# Patient Record
Sex: MALE | Race: BLACK OR AFRICAN AMERICAN | NOT HISPANIC OR LATINO | Employment: FULL TIME | URBAN - METROPOLITAN AREA
[De-identification: names, ages, dates, MRNs, and addresses within clinical notes are randomized per-mention and may not be internally consistent; named-entity substitution may affect disease eponyms.]

---

## 2017-04-03 ENCOUNTER — HOSPITAL ENCOUNTER (OUTPATIENT)
Dept: URGENT CARE | Facility: CLINIC | Age: 42
Discharge: HOME | End: 2017-04-03
Attending: GENERAL PRACTICE | Admitting: GENERAL PRACTICE

## 2017-05-03 ENCOUNTER — HOSPITAL ENCOUNTER (OUTPATIENT)
Dept: URGENT CARE | Facility: CLINIC | Age: 42
Discharge: HOME | End: 2017-05-03
Attending: FAMILY MEDICINE | Admitting: FAMILY MEDICINE

## 2017-05-30 ENCOUNTER — HOSPITAL ENCOUNTER (OUTPATIENT)
Dept: MRI IMAGING | Facility: HOSPITAL | Age: 42
Discharge: HOME | End: 2017-05-30
Attending: PODIATRIST | Admitting: PODIATRIST

## 2017-06-02 ENCOUNTER — HOSPITAL ENCOUNTER (OUTPATIENT)
Dept: OTHER | Facility: HOSPITAL | Age: 42
Discharge: HOME | End: 2017-06-02
Attending: PODIATRIST | Admitting: PODIATRIST

## 2017-06-21 ENCOUNTER — HOSPITAL ENCOUNTER (OUTPATIENT)
Dept: OTHER | Facility: HOSPITAL | Age: 42
Discharge: HOME | End: 2017-06-21
Attending: PODIATRIST | Admitting: PODIATRIST

## 2017-06-21 LAB
ANION GAP SERPL CALC-SCNC: 11.8 MMOL/L (ref 10–20)
BACTERIA SPEC AEROBE CULT: NORMAL
BASOPHILS # BLD AUTO: 0.1 10*3/UL (ref 0–0.2)
BASOPHILS NFR BLD AUTO: 1 % (ref 0–2)
BUN SERPL-MCNC: 8 MG/DL (ref 8–20)
BUN/CREAT SERPL: 8 (ref 6.2–20.3)
CALCIUM SERPL-MCNC: 9.1 MG/DL (ref 8.9–10.3)
CHLORIDE SERPL-SCNC: 103 MMOL/L (ref 101–111)
CONV CO2: 27 MMOL/L (ref 22–32)
CREAT UR-MCNC: 1 MG/DL (ref 0.7–1.2)
DIFFERENTIAL METHOD BLD: (no result)
EOSINOPHIL # BLD AUTO: 0.3 10*3/UL (ref 0–0.3)
EOSINOPHIL # BLD AUTO: 3 % (ref 0–3)
ERYTHROCYTE [DISTWIDTH] IN BLOOD BY AUTOMATED COUNT: 13.6 % (ref 11.5–14.5)
GLUCOSE SERPL-MCNC: 95 MG/DL (ref 65–99)
HCT VFR BLD AUTO: 41.5 % (ref 40–54)
HGB BLD-MCNC: 13.7 G/DL (ref 14–18)
LYMPHOCYTES # BLD AUTO: 2.1 10*3/UL (ref 0.8–4.8)
LYMPHOCYTES NFR BLD AUTO: 24 % (ref 18–42)
Lab: NORMAL
MCH RBC QN AUTO: 29.9 PG (ref 26–32)
MCHC RBC AUTO-ENTMCNC: 33 G/DL (ref 32–36)
MCV RBC AUTO: 90.4 FL (ref 80–94)
MICRO REPORT STATUS: NORMAL
MONOCYTES # BLD AUTO: 0.9 10*3/UL (ref 0.1–1.3)
MONOCYTES NFR BLD AUTO: 11 % (ref 2–11)
NEUTROPHILS # BLD AUTO: 5.3 10*3/UL (ref 2.3–8.6)
NEUTROPHILS NFR BLD AUTO: 61 % (ref 50–75)
NRBC BLD AUTO-RTO: 0 /100{WBCS}
NRBC/RBC NFR BLD MANUAL: 0 10*3/UL
PLATELET # BLD AUTO: 415 10*3/UL (ref 150–450)
PMV BLD AUTO: 7.7 FL (ref 7.4–10.4)
POTASSIUM SERPL-SCNC: 3.8 MMOL/L (ref 3.6–5.1)
RBC # BLD AUTO: 4.59 10*6/UL (ref 4.6–6)
SODIUM SERPL-SCNC: 138 MMOL/L (ref 136–144)
SPECIMEN SOURCE: NORMAL
WBC # BLD AUTO: 8.6 10*3/UL (ref 4.5–11.5)

## 2017-06-28 ENCOUNTER — HOSPITAL ENCOUNTER (OUTPATIENT)
Dept: PREOP | Facility: HOSPITAL | Age: 42
Setting detail: HOSPITAL OUTPATIENT SURGERY
Discharge: HOME | End: 2017-06-28
Attending: PODIATRIST | Admitting: PODIATRIST

## 2017-07-26 ENCOUNTER — HOSPITAL ENCOUNTER (OUTPATIENT)
Dept: ORTHOPEDIC SURGERY | Facility: CLINIC | Age: 42
Discharge: HOME | End: 2017-07-26
Attending: PODIATRIST | Admitting: PODIATRIST

## 2021-06-21 ENCOUNTER — HOSPITAL ENCOUNTER (EMERGENCY)
Facility: HOSPITAL | Age: 46
Discharge: HOME | End: 2021-06-22
Admitting: EMERGENCY MEDICINE

## 2021-06-21 ENCOUNTER — APPOINTMENT (OUTPATIENT)
Dept: CT IMAGING | Facility: HOSPITAL | Age: 46
End: 2021-06-21

## 2021-06-21 DIAGNOSIS — R10.31 RIGHT LOWER QUADRANT ABDOMINAL PAIN: Primary | ICD-10-CM

## 2021-06-21 DIAGNOSIS — K92.1 MELENA: ICD-10-CM

## 2021-06-21 DIAGNOSIS — K57.92 DIVERTICULITIS: ICD-10-CM

## 2021-06-21 LAB
ALBUMIN SERPL-MCNC: 3.9 G/DL (ref 3.5–5.2)
ALBUMIN/GLOB SERPL: 1 G/DL
ALP SERPL-CCNC: 65 U/L (ref 39–117)
ALT SERPL W P-5'-P-CCNC: 20 U/L (ref 1–41)
ANION GAP SERPL CALCULATED.3IONS-SCNC: 11 MMOL/L (ref 5–15)
AST SERPL-CCNC: 21 U/L (ref 1–40)
BASOPHILS # BLD AUTO: 0.1 10*3/MM3 (ref 0–0.2)
BASOPHILS NFR BLD AUTO: 0.9 % (ref 0–1.5)
BILIRUB SERPL-MCNC: 0.4 MG/DL (ref 0–1.2)
BILIRUB UR QL STRIP: ABNORMAL
BUN SERPL-MCNC: 8 MG/DL (ref 6–20)
BUN/CREAT SERPL: 7.6 (ref 7–25)
CALCIUM SPEC-SCNC: 8.5 MG/DL (ref 8.6–10.5)
CHLORIDE SERPL-SCNC: 102 MMOL/L (ref 98–107)
CLARITY UR: ABNORMAL
CO2 SERPL-SCNC: 26 MMOL/L (ref 22–29)
COLOR UR: ABNORMAL
CREAT SERPL-MCNC: 1.05 MG/DL (ref 0.76–1.27)
DEPRECATED RDW RBC AUTO: 42.9 FL (ref 37–54)
EOSINOPHIL # BLD AUTO: 0.4 10*3/MM3 (ref 0–0.4)
EOSINOPHIL NFR BLD AUTO: 4.5 % (ref 0.3–6.2)
ERYTHROCYTE [DISTWIDTH] IN BLOOD BY AUTOMATED COUNT: 13.8 % (ref 12.3–15.4)
GFR SERPL CREATININE-BSD FRML MDRD: 93 ML/MIN/1.73
GLOBULIN UR ELPH-MCNC: 4 GM/DL
GLUCOSE SERPL-MCNC: 110 MG/DL (ref 65–99)
GLUCOSE UR STRIP-MCNC: NEGATIVE MG/DL
HCT VFR BLD AUTO: 39.7 % (ref 37.5–51)
HGB BLD-MCNC: 13.4 G/DL (ref 13–17.7)
HGB UR QL STRIP.AUTO: NEGATIVE
KETONES UR QL STRIP: NEGATIVE
LEUKOCYTE ESTERASE UR QL STRIP.AUTO: NEGATIVE
LIPASE SERPL-CCNC: 22 U/L (ref 13–60)
LYMPHOCYTES # BLD AUTO: 1.6 10*3/MM3 (ref 0.7–3.1)
LYMPHOCYTES NFR BLD AUTO: 18.9 % (ref 19.6–45.3)
MCH RBC QN AUTO: 30 PG (ref 26.6–33)
MCHC RBC AUTO-ENTMCNC: 33.8 G/DL (ref 31.5–35.7)
MCV RBC AUTO: 88.8 FL (ref 79–97)
MONOCYTES # BLD AUTO: 0.9 10*3/MM3 (ref 0.1–0.9)
MONOCYTES NFR BLD AUTO: 10.4 % (ref 5–12)
NEUTROPHILS NFR BLD AUTO: 5.6 10*3/MM3 (ref 1.7–7)
NEUTROPHILS NFR BLD AUTO: 65.3 % (ref 42.7–76)
NITRITE UR QL STRIP: NEGATIVE
NRBC BLD AUTO-RTO: 0.1 /100 WBC (ref 0–0.2)
PH UR STRIP.AUTO: 6 [PH] (ref 5–8)
PLATELET # BLD AUTO: 695 10*3/MM3 (ref 140–450)
PMV BLD AUTO: 7 FL (ref 6–12)
POTASSIUM SERPL-SCNC: 4 MMOL/L (ref 3.5–5.2)
PROT SERPL-MCNC: 7.9 G/DL (ref 6–8.5)
PROT UR QL STRIP: ABNORMAL
RBC # BLD AUTO: 4.47 10*6/MM3 (ref 4.14–5.8)
SODIUM SERPL-SCNC: 139 MMOL/L (ref 136–145)
SP GR UR STRIP: 1.03 (ref 1–1.03)
UROBILINOGEN UR QL STRIP: ABNORMAL
WBC # BLD AUTO: 8.6 10*3/MM3 (ref 3.4–10.8)

## 2021-06-21 PROCEDURE — 99284 EMERGENCY DEPT VISIT MOD MDM: CPT

## 2021-06-21 PROCEDURE — 83690 ASSAY OF LIPASE: CPT | Performed by: NURSE PRACTITIONER

## 2021-06-21 PROCEDURE — 0 IOPAMIDOL PER 1 ML: Performed by: NURSE PRACTITIONER

## 2021-06-21 PROCEDURE — 81003 URINALYSIS AUTO W/O SCOPE: CPT | Performed by: NURSE PRACTITIONER

## 2021-06-21 PROCEDURE — 80053 COMPREHEN METABOLIC PANEL: CPT

## 2021-06-21 PROCEDURE — 25010000002 ONDANSETRON PER 1 MG: Performed by: NURSE PRACTITIONER

## 2021-06-21 PROCEDURE — 74177 CT ABD & PELVIS W/CONTRAST: CPT

## 2021-06-21 PROCEDURE — 25010000002 MORPHINE PER 10 MG: Performed by: NURSE PRACTITIONER

## 2021-06-21 PROCEDURE — 96374 THER/PROPH/DIAG INJ IV PUSH: CPT

## 2021-06-21 PROCEDURE — 96375 TX/PRO/DX INJ NEW DRUG ADDON: CPT

## 2021-06-21 PROCEDURE — 85025 COMPLETE CBC W/AUTO DIFF WBC: CPT

## 2021-06-21 RX ORDER — ONDANSETRON 2 MG/ML
4 INJECTION INTRAMUSCULAR; INTRAVENOUS ONCE
Status: COMPLETED | OUTPATIENT
Start: 2021-06-21 | End: 2021-06-21

## 2021-06-21 RX ORDER — MORPHINE SULFATE 4 MG/ML
4 INJECTION, SOLUTION INTRAMUSCULAR; INTRAVENOUS ONCE
Status: COMPLETED | OUTPATIENT
Start: 2021-06-21 | End: 2021-06-21

## 2021-06-21 RX ADMIN — ONDANSETRON 4 MG: 2 INJECTION INTRAMUSCULAR; INTRAVENOUS at 20:18

## 2021-06-21 RX ADMIN — SODIUM CHLORIDE, POTASSIUM CHLORIDE, SODIUM LACTATE AND CALCIUM CHLORIDE 1000 ML: 600; 310; 30; 20 INJECTION, SOLUTION INTRAVENOUS at 20:18

## 2021-06-21 RX ADMIN — IOPAMIDOL 100 ML: 755 INJECTION, SOLUTION INTRAVENOUS at 22:41

## 2021-06-21 RX ADMIN — MORPHINE SULFATE 4 MG: 4 INJECTION INTRAVENOUS at 20:18

## 2021-06-21 NOTE — ED NOTES
"C/O stomach pain for 2 weeks. Dark unformed stools w/ Mucous. Black formed stool on Saturday. Pain on right lower abdomen \"it feels like theres heat in that area\".     Sushil Burleson RN  06/21/21 3620    "

## 2021-06-22 VITALS
RESPIRATION RATE: 16 BRPM | SYSTOLIC BLOOD PRESSURE: 125 MMHG | BODY MASS INDEX: 37.72 KG/M2 | TEMPERATURE: 98.2 F | HEIGHT: 75 IN | WEIGHT: 303.35 LBS | HEART RATE: 74 BPM | OXYGEN SATURATION: 96 % | DIASTOLIC BLOOD PRESSURE: 83 MMHG

## 2021-06-22 PROCEDURE — 25010000002 CEFTRIAXONE PER 250 MG: Performed by: NURSE PRACTITIONER

## 2021-06-22 PROCEDURE — 96375 TX/PRO/DX INJ NEW DRUG ADDON: CPT

## 2021-06-22 RX ORDER — PANTOPRAZOLE SODIUM 40 MG/10ML
40 INJECTION, POWDER, LYOPHILIZED, FOR SOLUTION INTRAVENOUS ONCE
Status: COMPLETED | OUTPATIENT
Start: 2021-06-22 | End: 2021-06-22

## 2021-06-22 RX ORDER — OMEPRAZOLE 40 MG/1
40 CAPSULE, DELAYED RELEASE ORAL DAILY
Qty: 14 CAPSULE | Refills: 0 | Status: ON HOLD | OUTPATIENT
Start: 2021-06-22 | End: 2022-09-20

## 2021-06-22 RX ORDER — CEFDINIR 300 MG/1
300 CAPSULE ORAL 2 TIMES DAILY
Qty: 14 CAPSULE | Refills: 0 | Status: SHIPPED | OUTPATIENT
Start: 2021-06-22 | End: 2021-06-29

## 2021-06-22 RX ADMIN — WATER 1 G: 1000 INJECTION, SOLUTION INTRAVENOUS at 01:02

## 2021-06-22 RX ADMIN — PANTOPRAZOLE SODIUM 40 MG: 40 INJECTION, POWDER, FOR SOLUTION INTRAVENOUS at 01:02

## 2021-06-22 NOTE — ED PROVIDER NOTES
Subjective   45-year-old male presents with a 2-week history of mucousy stool, followed by 2-day history of melena with right flank pain that radiates to the right lower quadrant.  Patient reports last Tuesday, 6/15/2021 he began with some abdominal bloating with constipation, reports he had a normal bowel movement this Thursday.  He reports he took a dose of Pepto-Bismol on Friday.  He reports he has had no vomiting nor nausea.  He does report subjective fever of chills.  He does report a history of peptic ulcer disease that he ports was NSAID induced.  He has not been seen by [facility] since 2010 when the symptoms resolved.    1. Location: Right flank  2. Quality: Shooting  3. Severity: Moderate  4. Worsening factors: Bowel movement  5. Alleviating factors: Denies  6. Onset: 2 weeks, worse 2 days ago  7. Radiation: Right lower quadrant  8. Frequency: Intermittent  9. Co-morbidities: Past Medical History:  No date: Peptic ulcer disease  10. Source: Patient            Review of Systems   Constitutional: Positive for appetite change, chills and fatigue. Negative for diaphoresis and fever.   Gastrointestinal: Positive for abdominal pain and blood in stool. Negative for constipation, diarrhea, nausea and vomiting.   Genitourinary: Positive for flank pain. Negative for decreased urine volume, difficulty urinating and hematuria.   Skin: Negative for color change, pallor and rash.   Neurological: Negative for dizziness and syncope.   Hematological: Negative for adenopathy.   All other systems reviewed and are negative.      Past Medical History:   Diagnosis Date   • Peptic ulcer disease        Allergies   Allergen Reactions   • Nsaids GI Intolerance       Past Surgical History:   Procedure Laterality Date   • ANKLE SURGERY Left 2016       History reviewed. No pertinent family history.    Social History     Socioeconomic History   • Marital status: Single     Spouse name: Not on file   • Number of children: Not on file   • Years  of education: Not on file   • Highest education level: Not on file   Tobacco Use   • Smoking status: Never Smoker   • Smokeless tobacco: Never Used   Vaping Use   • Vaping Use: Never used   Substance and Sexual Activity   • Alcohol use: Not Currently   • Drug use: Defer   • Sexual activity: Defer           Objective   Physical Exam  Vitals and nursing note reviewed.   Constitutional:       General: He is awake. He is not in acute distress.     Appearance: Normal appearance. He is well-developed. He is morbidly obese. He is not ill-appearing or toxic-appearing.   HENT:      Mouth/Throat:      Mouth: Mucous membranes are moist.   Eyes:      General: No scleral icterus.  Cardiovascular:      Rate and Rhythm: Normal rate and regular rhythm.      Heart sounds: Normal heart sounds, S1 normal and S2 normal. Heart sounds not distant. No murmur heard.   No friction rub. No gallop.    Pulmonary:      Effort: Pulmonary effort is normal.      Breath sounds: Normal breath sounds and air entry.   Abdominal:      General: Bowel sounds are normal. There is no distension.      Palpations: Abdomen is soft. There is no mass.      Tenderness: There is abdominal tenderness in the right lower quadrant. There is right CVA tenderness and guarding. There is no left CVA tenderness or rebound.      Hernia: No hernia is present.       Skin:     General: Skin is warm and dry.      Capillary Refill: Capillary refill takes less than 2 seconds.      Coloration: Skin is not jaundiced or pale.      Findings: No rash.   Neurological:      Mental Status: He is alert and oriented to person, place, and time.   Psychiatric:         Mood and Affect: Mood normal.         Behavior: Behavior normal. Behavior is cooperative.         Thought Content: Thought content normal.         Judgment: Judgment normal.         Procedures           ED Course  ED Course as of Jun 22 0511   Mon Jun 21, 2021   5302 Laying care related to patient not being taken to CT.     [AL]   2226 Awaiting patient to go to CT.    [AL]      ED Course User Index  [AL] Tory Moses, APRN      CT Abdomen Pelvis With Contrast    Result Date: 6/21/2021  1. The findings are compatible with acute diverticulitis of the sigmoid colon, with thickening of the wall of a segment of the sigmoid colon and moderate surrounding fat stranding. Colon cancer could less commonly cause similar findings; while considered less likely, recommend follow-up to resolution or colonoscopy if it has not been previously done. Negative for abscess. 2. Mild cardiomegaly. 3. Additional findings as reported above.  Electronically Signed By-Kellee Acosta MD On:6/21/2021 11:16 PM This report was finalized on [id number] by  Kellee Acosta MD.    Medications   lactated ringers bolus 1,000 mL (0 mL Intravenous Stopped 6/21/21 2050)   Morphine sulfate (PF) injection 4 mg (4 mg Intravenous Given 6/21/21 2018)   ondansetron (ZOFRAN) injection 4 mg (4 mg Intravenous Given 6/21/21 2018)   iopamidol (ISOVUE-370) 76 % injection 100 mL (100 mL Intravenous Given 6/21/21 2241)   cefTRIAXone (ROCEPHIN) in SWFI 1 gram/10ml IV PUSH syringe (1 g Intravenous Given 6/22/21 0102)   pantoprazole (PROTONIX) injection 40 mg (40 mg Intravenous Given 6/22/21 0102)     Labs Reviewed   COMPREHENSIVE METABOLIC PANEL - Abnormal; Notable for the following components:       Result Value    Glucose 110 (*)     Calcium 8.5 (*)     All other components within normal limits    Narrative:     GFR Normal >60  Chronic Kidney Disease <60  Kidney Failure <15     CBC WITH AUTO DIFFERENTIAL - Abnormal; Notable for the following components:    Platelets 695 (*)     Lymphocyte % 18.9 (*)     All other components within normal limits   URINALYSIS W/ CULTURE IF INDICATED - Abnormal; Notable for the following components:    Color, UA Dark Yellow (*)     Appearance, UA Cloudy (*)     Bilirubin, UA Small (1+) (*)     Protein, UA Trace (*)     All other components within normal  limits    Narrative:     Urine microscopic not indicated.   LIPASE - Normal   GASTROINTESTINAL PANEL, PCR   CBC AND DIFFERENTIAL    Narrative:     The following orders were created for panel order CBC & Differential.  Procedure                               Abnormality         Status                     ---------                               -----------         ------                     CBC Auto Differential[[id number]]        Abnormal            Final result                 Please view results for these tests on the individual orders.                                          MDM  Number of Diagnoses or Management Options  Diverticulitis  Melena  Right lower quadrant abdominal pain  Diagnosis management comments: Chart Review: 4/8/2021 patient was seen at the urgent care center for thumb tendinitis.  Comorbidity: Past Medical History:  No date: Peptic ulcer disease  Imaging: Was interpreted by physician and reviewed by myself: CT Abdomen Pelvis With Contrast   Final Result    1. The findings are compatible with acute diverticulitis of the sigmoid    colon, with thickening of the wall of a segment of the sigmoid colon and    moderate surrounding fat stranding. Colon cancer could less commonly    cause similar findings; while considered less likely, recommend    follow-up to resolution or colonoscopy if it has not been previously    done. Negative for abscess.    2. Mild cardiomegaly.    3. Additional findings as reported above.         Electronically Signed By-Kellee Acosta MD On:6/21/2021 11:16 PM    This report was finalized on [id number] by  Kellee Acosta MD    Patient undressed and placed in gown for exam.  Appropriate PPE worn during patient exam. 45-year-old male presents with a 2-week history of mucousy stool, followed by 2-day history of melena with right flank pain that radiates to the right lower quadrant.  Patient reports last Tuesday, 6/15/2021 he began with some abdominal bloating with constipation,  reports he had a normal bowel movement this Thursday.  He reports he took a dose of Pepto-Bismol on Friday.  He reports he has had no vomiting nor nausea.  He does report subjective fever of chills.  He does report a history of peptic ulcer disease that he ports was NSAID induced.  He has not been seen by [facility] since 2010 when the symptoms resolved.  On exam, patient was noted to have right CVAT, as well as right lower quadrant tenderness to palpation.  IV established and labs obtained.  Abdominal protocol initiated.  Patient was given lactated Ringer's 1 L bolus, morphine 4 mg IV, and Zofran 4 mg IV.  CT of the abdomen pelvis with IV contrast obtained with the above findings. CBC and CMP essentially unremarkable.  No infectious process noted of the urine.  Lipase within normal limits. Upon reassessment, patient reports that his pain is relieved.  He was given Rocephin 1 g IV, and discharged home with Omnicef.  He is instructed to eat a clear liquid diet for the next 48 hours, then advance as tolerated.  He is given referral to GI, as its been 11 years since he has been seen by them.  Hemoccult was negative.  Upon reassessment, he is flesh tone warm and dry no distress noted.  Vital signs are stable.    Disposition/Treatment: Discussed results with patient, verbalized understanding.  Discussed reasons to return to the ER, patient verbalized understanding.  Agreeable with plan of care.  Patient was stable upon discharge.    EMR Dragon transcription disclaimer:  Some of this encounter note is an electronic transcription translation of spoken language to printed text. The electronic translation of spoken language may permit erroneous, or at times, nonsensical words or phrases to be inadvertently transcribed; Although I have reviewed the note for such errors some may still exist.              Amount and/or Complexity of Data Reviewed  Clinical lab tests: reviewed  Tests in the radiology section of CPT®:  reviewed    Patient Progress  Patient progress: stable      Final diagnoses:   Right lower quadrant abdominal pain   Diverticulitis   Melena       ED Disposition  ED Disposition     ED Disposition Condition Comment    Discharge Stable           Johnnie Zuleta MD  [address] [facility] [place] [address] [phone]    Schedule an appointment as soon as possible for a visit       [facility]  [address] [address] [phone]  Call today      [facility] EMERGENCY DEPARTMENT  [address] [address] [phone]  Go to   If symptoms worsen         Medication List      New Prescriptions    cefdinir 300 MG capsule  Commonly known as: OMNICEF  Take 1 capsule by mouth 2 (Two) Times a Day for 7 days.     omeprazole 40 MG capsule  Commonly known as: priLOSEC  Take 1 capsule by mouth Daily.           Where to Get Your Medications      These medications were sent to [id number] - [place], [place] - [address] AT [facility] - [phone]  - [phone]   [address] [facility] [place] [address]    Phone: [phone]   · cefdinir 300 MG capsule  · omeprazole 40 MG capsule          Tory Moses, APRN  06/22/21 0511

## 2021-06-22 NOTE — DISCHARGE INSTRUCTIONS
Take omeprazole and Omnicef as prescribed.  As discussed, clear liquid diet for the next 48 hours, then advance as tolerated.  Avoid any red or orange foods as this may appear like blood in your vomit or stool.  It is important to establish primary care provider for your primary care needs, call Dr. Zuleta's office to establish care. Call gastroenterology to make follow up appointment. Return to the ER for new or worsening symptoms.

## 2021-07-30 ENCOUNTER — OFFICE (AMBULATORY)
Dept: URBAN - METROPOLITAN AREA CLINIC 64 | Facility: CLINIC | Age: 46
End: 2021-07-30

## 2021-07-30 VITALS
HEIGHT: 75 IN | DIASTOLIC BLOOD PRESSURE: 86 MMHG | SYSTOLIC BLOOD PRESSURE: 138 MMHG | HEART RATE: 72 BPM | WEIGHT: 305 LBS

## 2021-07-30 DIAGNOSIS — R93.3 ABNORMAL FINDINGS ON DIAGNOSTIC IMAGING OF OTHER PARTS OF DI: ICD-10-CM

## 2021-07-30 DIAGNOSIS — K57.32 DIVERTICULITIS OF LARGE INTESTINE WITHOUT PERFORATION OR ABS: ICD-10-CM

## 2021-07-30 DIAGNOSIS — K59.00 CONSTIPATION, UNSPECIFIED: ICD-10-CM

## 2021-07-30 DIAGNOSIS — R10.9 UNSPECIFIED ABDOMINAL PAIN: ICD-10-CM

## 2021-07-30 PROCEDURE — 99204 OFFICE O/P NEW MOD 45 MIN: CPT | Performed by: NURSE PRACTITIONER

## 2021-09-01 ENCOUNTER — OFFICE (AMBULATORY)
Dept: URBAN - METROPOLITAN AREA PATHOLOGY 4 | Facility: PATHOLOGY | Age: 46
End: 2021-09-01

## 2021-09-01 ENCOUNTER — ON CAMPUS - OUTPATIENT (AMBULATORY)
Dept: URBAN - METROPOLITAN AREA HOSPITAL 2 | Facility: HOSPITAL | Age: 46
End: 2021-09-01

## 2021-09-01 VITALS
OXYGEN SATURATION: 96 % | SYSTOLIC BLOOD PRESSURE: 125 MMHG | SYSTOLIC BLOOD PRESSURE: 117 MMHG | OXYGEN SATURATION: 98 % | SYSTOLIC BLOOD PRESSURE: 127 MMHG | HEART RATE: 84 BPM | HEART RATE: 83 BPM | WEIGHT: 304 LBS | SYSTOLIC BLOOD PRESSURE: 128 MMHG | DIASTOLIC BLOOD PRESSURE: 82 MMHG | OXYGEN SATURATION: 99 % | SYSTOLIC BLOOD PRESSURE: 123 MMHG | RESPIRATION RATE: 16 BRPM | DIASTOLIC BLOOD PRESSURE: 66 MMHG | SYSTOLIC BLOOD PRESSURE: 114 MMHG | DIASTOLIC BLOOD PRESSURE: 81 MMHG | SYSTOLIC BLOOD PRESSURE: 135 MMHG | DIASTOLIC BLOOD PRESSURE: 109 MMHG | TEMPERATURE: 96.9 F | DIASTOLIC BLOOD PRESSURE: 74 MMHG | HEART RATE: 79 BPM | HEART RATE: 89 BPM | DIASTOLIC BLOOD PRESSURE: 77 MMHG | OXYGEN SATURATION: 97 % | RESPIRATION RATE: 18 BRPM | RESPIRATION RATE: 17 BRPM | DIASTOLIC BLOOD PRESSURE: 80 MMHG | SYSTOLIC BLOOD PRESSURE: 153 MMHG | HEART RATE: 75 BPM | HEART RATE: 86 BPM | SYSTOLIC BLOOD PRESSURE: 141 MMHG | DIASTOLIC BLOOD PRESSURE: 88 MMHG | HEART RATE: 88 BPM | HEIGHT: 75 IN | DIASTOLIC BLOOD PRESSURE: 76 MMHG | SYSTOLIC BLOOD PRESSURE: 143 MMHG | HEART RATE: 78 BPM | DIASTOLIC BLOOD PRESSURE: 89 MMHG

## 2021-09-01 DIAGNOSIS — R93.3 ABNORMAL FINDINGS ON DIAGNOSTIC IMAGING OF OTHER PARTS OF DI: ICD-10-CM

## 2021-09-01 DIAGNOSIS — D12.3 BENIGN NEOPLASM OF TRANSVERSE COLON: ICD-10-CM

## 2021-09-01 DIAGNOSIS — K64.8 OTHER HEMORRHOIDS: ICD-10-CM

## 2021-09-01 DIAGNOSIS — K57.30 DIVERTICULOSIS OF LARGE INTESTINE WITHOUT PERFORATION OR ABS: ICD-10-CM

## 2021-09-01 LAB
GI HISTOLOGY: A. UNSPECIFIED: (no result)
GI HISTOLOGY: PDF REPORT: (no result)

## 2021-09-01 PROCEDURE — 45385 COLONOSCOPY W/LESION REMOVAL: CPT | Performed by: INTERNAL MEDICINE

## 2021-09-01 PROCEDURE — 88305 TISSUE EXAM BY PATHOLOGIST: CPT | Performed by: INTERNAL MEDICINE

## 2021-09-10 ENCOUNTER — OFFICE (AMBULATORY)
Dept: URBAN - METROPOLITAN AREA CLINIC 64 | Facility: CLINIC | Age: 46
End: 2021-09-10

## 2021-09-10 VITALS
DIASTOLIC BLOOD PRESSURE: 95 MMHG | WEIGHT: 307 LBS | HEIGHT: 75 IN | SYSTOLIC BLOOD PRESSURE: 141 MMHG | HEART RATE: 79 BPM

## 2021-09-10 DIAGNOSIS — R10.31 RIGHT LOWER QUADRANT PAIN: ICD-10-CM

## 2021-09-10 DIAGNOSIS — K59.00 CONSTIPATION, UNSPECIFIED: ICD-10-CM

## 2021-09-10 DIAGNOSIS — K63.5 POLYP OF COLON: ICD-10-CM

## 2021-09-10 PROCEDURE — 99213 OFFICE O/P EST LOW 20 MIN: CPT | Performed by: NURSE PRACTITIONER

## 2022-09-19 ENCOUNTER — HOSPITAL ENCOUNTER (INPATIENT)
Facility: HOSPITAL | Age: 47
LOS: 3 days | Discharge: HOME | End: 2022-09-23
Attending: EMERGENCY MEDICINE | Admitting: HOSPITALIST

## 2022-09-19 ENCOUNTER — APPOINTMENT (OUTPATIENT)
Dept: GENERAL RADIOLOGY | Facility: HOSPITAL | Age: 47
End: 2022-09-19

## 2022-09-19 ENCOUNTER — APPOINTMENT (OUTPATIENT)
Dept: CARDIOLOGY | Facility: HOSPITAL | Age: 47
End: 2022-09-19

## 2022-09-19 DIAGNOSIS — I27.20 PULMONARY HYPERTENSION: ICD-10-CM

## 2022-09-19 DIAGNOSIS — I50.9 ACUTE CONGESTIVE HEART FAILURE, UNSPECIFIED HEART FAILURE TYPE: Primary | ICD-10-CM

## 2022-09-19 DIAGNOSIS — I42.0 CARDIOMYOPATHY, DILATED: ICD-10-CM

## 2022-09-19 PROBLEM — I50.20 HFREF (HEART FAILURE WITH REDUCED EJECTION FRACTION): Status: ACTIVE | Noted: 2022-09-19

## 2022-09-19 PROBLEM — R73.03 PRE-DIABETES: Status: ACTIVE | Noted: 2022-09-19

## 2022-09-19 PROBLEM — E66.2 CLASS 2 OBESITY WITH ALVEOLAR HYPOVENTILATION, SERIOUS COMORBIDITY, AND BODY MASS INDEX (BMI) OF 39.0 TO 39.9 IN ADULT: Status: ACTIVE | Noted: 2022-09-19

## 2022-09-19 PROBLEM — E78.49 OTHER HYPERLIPIDEMIA: Status: ACTIVE | Noted: 2022-09-19

## 2022-09-19 PROBLEM — E66.812 CLASS 2 OBESITY WITH ALVEOLAR HYPOVENTILATION, SERIOUS COMORBIDITY, AND BODY MASS INDEX (BMI) OF 39.0 TO 39.9 IN ADULT: Status: ACTIVE | Noted: 2022-09-19

## 2022-09-19 LAB
ALBUMIN SERPL-MCNC: 3.96 G/DL (ref 3.5–5.2)
ALBUMIN/GLOB SERPL: 1.9 G/DL
ALP SERPL-CCNC: 63 U/L (ref 39–117)
ALT SERPL W P-5'-P-CCNC: 42 U/L (ref 1–41)
ANION GAP SERPL CALCULATED.3IONS-SCNC: 10.4 MMOL/L (ref 5–15)
AST SERPL-CCNC: 36 U/L (ref 1–40)
BASOPHILS # BLD AUTO: 0.05 10*3/MM3 (ref 0–0.2)
BASOPHILS NFR BLD AUTO: 0.5 % (ref 0–1.5)
BH CV ECHO MEAS - ACS: 2.46 CM
BH CV ECHO MEAS - AO MAX PG: 6.9 MMHG
BH CV ECHO MEAS - AO MEAN PG: 3.6 MMHG
BH CV ECHO MEAS - AO ROOT DIAM: 3.6 CM
BH CV ECHO MEAS - AO V2 MAX: 131.8 CM/SEC
BH CV ECHO MEAS - AO V2 VTI: 19.3 CM
BH CV ECHO MEAS - AVA(I,D): 1.55 CM2
BH CV ECHO MEAS - EDV(CUBED): 223.2 ML
BH CV ECHO MEAS - EDV(MOD-SP4): 203.4 ML
BH CV ECHO MEAS - EF(MOD-BP): 24 %
BH CV ECHO MEAS - EF(MOD-SP4): 24.4 %
BH CV ECHO MEAS - ESV(CUBED): 151.4 ML
BH CV ECHO MEAS - ESV(MOD-SP4): 153.7 ML
BH CV ECHO MEAS - FS: 12.1 %
BH CV ECHO MEAS - IVS/LVPW: 0.86 CM
BH CV ECHO MEAS - IVSD: 1.27 CM
BH CV ECHO MEAS - LA DIMENSION: 4.9 CM
BH CV ECHO MEAS - LV DIASTOLIC VOL/BSA (35-75): 76.1 CM2
BH CV ECHO MEAS - LV MASS(C)D: 382.5 GRAMS
BH CV ECHO MEAS - LV MAX PG: 2.6 MMHG
BH CV ECHO MEAS - LV MEAN PG: 1.06 MMHG
BH CV ECHO MEAS - LV SYSTOLIC VOL/BSA (12-30): 57.5 CM2
BH CV ECHO MEAS - LV V1 MAX: 80.1 CM/SEC
BH CV ECHO MEAS - LV V1 VTI: 10.4 CM
BH CV ECHO MEAS - LVIDD: 6.1 CM
BH CV ECHO MEAS - LVIDS: 5.3 CM
BH CV ECHO MEAS - LVOT AREA: 2.9 CM2
BH CV ECHO MEAS - LVOT DIAM: 1.91 CM
BH CV ECHO MEAS - LVPWD: 1.47 CM
BH CV ECHO MEAS - MR MAX PG: 66.4 MMHG
BH CV ECHO MEAS - MR MAX VEL: 407.3 CM/SEC
BH CV ECHO MEAS - MV A MAX VEL: 113.7 CM/SEC
BH CV ECHO MEAS - MV E MAX VEL: 109.3 CM/SEC
BH CV ECHO MEAS - MV E/A: 0.96
BH CV ECHO MEAS - MV MAX PG: 5.7 MMHG
BH CV ECHO MEAS - MV MEAN PG: 2.37 MMHG
BH CV ECHO MEAS - MV V2 VTI: 13.7 CM
BH CV ECHO MEAS - MVA(VTI): 2.17 CM2
BH CV ECHO MEAS - PA ACC TIME: 0.05 SEC
BH CV ECHO MEAS - PA PR(ACCEL): 55.6 MMHG
BH CV ECHO MEAS - PA V2 MAX: 70.4 CM/SEC
BH CV ECHO MEAS - RAP SYSTOLE: 15 MMHG
BH CV ECHO MEAS - RV MAX PG: 1.23 MMHG
BH CV ECHO MEAS - RV V1 MAX: 55.4 CM/SEC
BH CV ECHO MEAS - RV V1 VTI: 10 CM
BH CV ECHO MEAS - RVDD: 3.8 CM
BH CV ECHO MEAS - RVSP: 47.4 MMHG
BH CV ECHO MEAS - SI(MOD-SP4): 18.6 ML/M2
BH CV ECHO MEAS - SV(LVOT): 29.8 ML
BH CV ECHO MEAS - SV(MOD-SP4): 49.7 ML
BH CV ECHO MEAS - TR MAX PG: 32.4 MMHG
BH CV ECHO MEAS - TR MAX VEL: 284.8 CM/SEC
BILIRUB SERPL-MCNC: 1.5 MG/DL (ref 0–1.2)
BUN SERPL-MCNC: 12 MG/DL (ref 6–20)
BUN/CREAT SERPL: 9.8 (ref 7–25)
CALCIUM SPEC-SCNC: 8.9 MG/DL (ref 8.6–10.5)
CHLORIDE SERPL-SCNC: 107 MMOL/L (ref 98–107)
CHOLEST SERPL-MCNC: 151 MG/DL (ref 0–200)
CO2 SERPL-SCNC: 22.6 MMOL/L (ref 22–29)
CREAT SERPL-MCNC: 1.22 MG/DL (ref 0.76–1.27)
D DIMER PPP FEU-MCNC: 0.52 MCGFEU/ML
DEPRECATED RDW RBC AUTO: 46.9 FL (ref 37–54)
EGFRCR SERPLBLD CKD-EPI 2021: 75 ML/MIN/1.73
EOSINOPHIL # BLD AUTO: 0.22 10*3/MM3 (ref 0–0.4)
EOSINOPHIL NFR BLD AUTO: 2.3 % (ref 0.3–6.2)
ERYTHROCYTE [DISTWIDTH] IN BLOOD BY AUTOMATED COUNT: 13.8 % (ref 12.3–15.4)
FLUAV SUBTYP SPEC NAA+PROBE: NOT DETECTED
FLUBV RNA ISLT QL NAA+PROBE: NOT DETECTED
GLOBULIN UR ELPH-MCNC: 2.1 GM/DL
GLUCOSE SERPL-MCNC: 140 MG/DL (ref 65–99)
HBA1C MFR BLD: 5.9 % (ref 3.5–5.6)
HCT VFR BLD AUTO: 42.4 % (ref 37.5–51)
HDLC SERPL-MCNC: 39 MG/DL (ref 40–60)
HGB BLD-MCNC: 13.5 G/DL (ref 13–17.7)
IMM GRANULOCYTES # BLD AUTO: 0.01 10*3/MM3 (ref 0–0.05)
IMM GRANULOCYTES NFR BLD AUTO: 0.1 % (ref 0–0.5)
INR PPP: 0.9 (ref 0.8–1.2)
LDLC SERPL CALC-MCNC: 100 MG/DL (ref 0–100)
LDLC/HDLC SERPL: 2.57 {RATIO}
LYMPHOCYTES # BLD AUTO: 1.92 10*3/MM3 (ref 0.7–3.1)
LYMPHOCYTES NFR BLD AUTO: 20.5 % (ref 19.6–45.3)
MAXIMAL PREDICTED HEART RATE: 176 BPM
MCH RBC QN AUTO: 28.9 PG (ref 26.6–33)
MCHC RBC AUTO-ENTMCNC: 31.8 G/DL (ref 31.5–35.7)
MCV RBC AUTO: 90.8 FL (ref 79–97)
MONOCYTES # BLD AUTO: 0.57 10*3/MM3 (ref 0.1–0.9)
MONOCYTES NFR BLD AUTO: 6.1 % (ref 5–12)
NEUTROPHILS NFR BLD AUTO: 6.6 10*3/MM3 (ref 1.7–7)
NEUTROPHILS NFR BLD AUTO: 70.5 % (ref 42.7–76)
NT-PROBNP SERPL-MCNC: 4471 PG/ML (ref 0–450)
PLATELET # BLD AUTO: 549 10*3/MM3 (ref 140–450)
PMV BLD AUTO: 9.5 FL (ref 6–12)
POTASSIUM SERPL-SCNC: 3.8 MMOL/L (ref 3.5–5.2)
PROT SERPL-MCNC: 6.1 G/DL (ref 6–8.5)
PROTHROMBIN TIME: 12 SECONDS
QT INTERVAL: 388 MS
RBC # BLD AUTO: 4.67 10*6/MM3 (ref 4.14–5.8)
SARS-COV-2 RNA RESP QL NAA+PROBE: NOT DETECTED
SODIUM SERPL-SCNC: 140 MMOL/L (ref 136–145)
STRESS TARGET HR: 150 BPM
TRIGL SERPL-MCNC: 59 MG/DL (ref 0–150)
TROPONIN T SERPL-MCNC: <0.01 NG/ML (ref 0–0.03)
TSH SERPL DL<=0.05 MIU/L-ACNC: 1.92 UIU/ML (ref 0.27–4.2)
VLDLC SERPL-MCNC: 12 MG/DL (ref 5–40)
WBC NRBC COR # BLD: 9.37 10*3/MM3 (ref 3.4–10.8)

## 2022-09-19 PROCEDURE — 80061 LIPID PANEL: CPT | Performed by: HOSPITALIST

## 2022-09-19 PROCEDURE — 99204 OFFICE O/P NEW MOD 45 MIN: CPT | Performed by: INTERNAL MEDICINE

## 2022-09-19 PROCEDURE — 99285 EMERGENCY DEPT VISIT HI MDM: CPT

## 2022-09-19 PROCEDURE — 93306 TTE W/DOPPLER COMPLETE: CPT

## 2022-09-19 PROCEDURE — 93005 ELECTROCARDIOGRAM TRACING: CPT | Performed by: EMERGENCY MEDICINE

## 2022-09-19 PROCEDURE — 25010000002 SULFUR HEXAFLUORIDE MICROSPH 60.7-25 MG RECONSTITUTED SUSPENSION: Performed by: HOSPITALIST

## 2022-09-19 PROCEDURE — 93010 ELECTROCARDIOGRAM REPORT: CPT | Performed by: EMERGENCY MEDICINE

## 2022-09-19 PROCEDURE — 84484 ASSAY OF TROPONIN QUANT: CPT | Performed by: HOSPITALIST

## 2022-09-19 PROCEDURE — 93306 TTE W/DOPPLER COMPLETE: CPT | Performed by: INTERNAL MEDICINE

## 2022-09-19 PROCEDURE — G0378 HOSPITAL OBSERVATION PER HR: HCPCS

## 2022-09-19 PROCEDURE — 85610 PROTHROMBIN TIME: CPT | Performed by: EMERGENCY MEDICINE

## 2022-09-19 PROCEDURE — 99285 EMERGENCY DEPT VISIT HI MDM: CPT | Performed by: EMERGENCY MEDICINE

## 2022-09-19 PROCEDURE — 25010000002 ENOXAPARIN PER 10 MG: Performed by: HOSPITALIST

## 2022-09-19 PROCEDURE — 25010000002 FUROSEMIDE PER 20 MG: Performed by: INTERNAL MEDICINE

## 2022-09-19 PROCEDURE — 80050 GENERAL HEALTH PANEL: CPT | Performed by: EMERGENCY MEDICINE

## 2022-09-19 PROCEDURE — 83880 ASSAY OF NATRIURETIC PEPTIDE: CPT | Performed by: EMERGENCY MEDICINE

## 2022-09-19 PROCEDURE — 36415 COLL VENOUS BLD VENIPUNCTURE: CPT

## 2022-09-19 PROCEDURE — 84484 ASSAY OF TROPONIN QUANT: CPT | Performed by: EMERGENCY MEDICINE

## 2022-09-19 PROCEDURE — 71046 X-RAY EXAM CHEST 2 VIEWS: CPT | Performed by: EMERGENCY MEDICINE

## 2022-09-19 PROCEDURE — 87636 SARSCOV2 & INF A&B AMP PRB: CPT | Performed by: EMERGENCY MEDICINE

## 2022-09-19 PROCEDURE — 83036 HEMOGLOBIN GLYCOSYLATED A1C: CPT | Performed by: HOSPITALIST

## 2022-09-19 PROCEDURE — 85379 FIBRIN DEGRADATION QUANT: CPT | Performed by: EMERGENCY MEDICINE

## 2022-09-19 RX ORDER — NITROGLYCERIN 0.4 MG/1
0.4 TABLET SUBLINGUAL
Status: DISCONTINUED | OUTPATIENT
Start: 2022-09-19 | End: 2022-09-23 | Stop reason: HOSPADM

## 2022-09-19 RX ORDER — ASPIRIN 81 MG/1
81 TABLET ORAL DAILY
Status: DISCONTINUED | OUTPATIENT
Start: 2022-09-20 | End: 2022-09-23 | Stop reason: HOSPADM

## 2022-09-19 RX ORDER — HYDROCODONE BITARTRATE AND ACETAMINOPHEN 5; 325 MG/1; MG/1
1 TABLET ORAL EVERY 4 HOURS PRN
Status: DISCONTINUED | OUTPATIENT
Start: 2022-09-19 | End: 2022-09-23 | Stop reason: HOSPADM

## 2022-09-19 RX ORDER — BISACODYL 5 MG/1
5 TABLET, DELAYED RELEASE ORAL DAILY PRN
Status: DISCONTINUED | OUTPATIENT
Start: 2022-09-19 | End: 2022-09-23 | Stop reason: HOSPADM

## 2022-09-19 RX ORDER — LISINOPRIL 5 MG/1
10 TABLET ORAL
Status: DISCONTINUED | OUTPATIENT
Start: 2022-09-19 | End: 2022-09-23 | Stop reason: HOSPADM

## 2022-09-19 RX ORDER — SODIUM CHLORIDE 0.9 % (FLUSH) 0.9 %
10 SYRINGE (ML) INJECTION EVERY 12 HOURS SCHEDULED
Status: DISCONTINUED | OUTPATIENT
Start: 2022-09-19 | End: 2022-09-23 | Stop reason: HOSPADM

## 2022-09-19 RX ORDER — BISACODYL 10 MG
10 SUPPOSITORY, RECTAL RECTAL DAILY PRN
Status: DISCONTINUED | OUTPATIENT
Start: 2022-09-19 | End: 2022-09-23 | Stop reason: HOSPADM

## 2022-09-19 RX ORDER — FUROSEMIDE 10 MG/ML
40 INJECTION INTRAMUSCULAR; INTRAVENOUS DAILY
Status: DISCONTINUED | OUTPATIENT
Start: 2022-09-19 | End: 2022-09-21

## 2022-09-19 RX ORDER — AMOXICILLIN 250 MG
2 CAPSULE ORAL 2 TIMES DAILY PRN
Status: DISCONTINUED | OUTPATIENT
Start: 2022-09-19 | End: 2022-09-23 | Stop reason: HOSPADM

## 2022-09-19 RX ORDER — CARVEDILOL 3.12 MG/1
3.12 TABLET ORAL 2 TIMES DAILY WITH MEALS
Status: DISCONTINUED | OUTPATIENT
Start: 2022-09-19 | End: 2022-09-21

## 2022-09-19 RX ORDER — ACETAMINOPHEN 325 MG/1
650 TABLET ORAL EVERY 4 HOURS PRN
Status: DISCONTINUED | OUTPATIENT
Start: 2022-09-19 | End: 2022-09-23 | Stop reason: HOSPADM

## 2022-09-19 RX ORDER — PANTOPRAZOLE SODIUM 40 MG/1
40 TABLET, DELAYED RELEASE ORAL EVERY MORNING
Refills: 0 | Status: DISCONTINUED | OUTPATIENT
Start: 2022-09-20 | End: 2022-09-20

## 2022-09-19 RX ORDER — SODIUM CHLORIDE 0.9 % (FLUSH) 0.9 %
10 SYRINGE (ML) INJECTION AS NEEDED
Status: DISCONTINUED | OUTPATIENT
Start: 2022-09-19 | End: 2022-09-23 | Stop reason: HOSPADM

## 2022-09-19 RX ORDER — ASPIRIN 325 MG
325 TABLET ORAL ONCE
Status: COMPLETED | OUTPATIENT
Start: 2022-09-19 | End: 2022-09-19

## 2022-09-19 RX ORDER — ENOXAPARIN SODIUM 100 MG/ML
40 INJECTION SUBCUTANEOUS EVERY 12 HOURS
Status: DISCONTINUED | OUTPATIENT
Start: 2022-09-19 | End: 2022-09-23 | Stop reason: HOSPADM

## 2022-09-19 RX ORDER — POLYETHYLENE GLYCOL 3350 17 G/17G
17 POWDER, FOR SOLUTION ORAL DAILY PRN
Status: DISCONTINUED | OUTPATIENT
Start: 2022-09-19 | End: 2022-09-23 | Stop reason: HOSPADM

## 2022-09-19 RX ADMIN — ENOXAPARIN SODIUM 40 MG: 100 INJECTION SUBCUTANEOUS at 15:16

## 2022-09-19 RX ADMIN — SULFUR HEXAFLUORIDE 2 ML: KIT at 16:42

## 2022-09-19 RX ADMIN — ASPIRIN 325 MG ORAL TABLET 325 MG: 325 PILL ORAL at 02:35

## 2022-09-19 RX ADMIN — FUROSEMIDE 40 MG: 10 INJECTION, SOLUTION INTRAMUSCULAR; INTRAVENOUS at 18:30

## 2022-09-19 RX ADMIN — Medication 10 ML: at 20:30

## 2022-09-19 RX ADMIN — LISINOPRIL 10 MG: 5 TABLET ORAL at 18:30

## 2022-09-19 RX ADMIN — CARVEDILOL 3.12 MG: 3.12 TABLET, FILM COATED ORAL at 18:30

## 2022-09-20 PROBLEM — E66.812 CLASS 2 OBESITY WITH ALVEOLAR HYPOVENTILATION, SERIOUS COMORBIDITY, AND BODY MASS INDEX (BMI) OF 39.0 TO 39.9 IN ADULT: Chronic | Status: ACTIVE | Noted: 2022-09-19

## 2022-09-20 PROBLEM — R73.03 PRE-DIABETES: Chronic | Status: ACTIVE | Noted: 2022-09-19

## 2022-09-20 PROBLEM — I42.0 CARDIOMYOPATHY, DILATED: Status: ACTIVE | Noted: 2022-09-20

## 2022-09-20 PROBLEM — M21.969 ACQUIRED DEFORMITY OF ANKLE AND FOOT: Status: ACTIVE | Noted: 2017-05-04

## 2022-09-20 PROBLEM — E78.49 OTHER HYPERLIPIDEMIA: Chronic | Status: ACTIVE | Noted: 2022-09-19

## 2022-09-20 PROBLEM — I50.9 ACUTE CONGESTIVE HEART FAILURE, UNSPECIFIED HEART FAILURE TYPE: Status: ACTIVE | Noted: 2022-09-20

## 2022-09-20 PROBLEM — M10.9 GOUT: Status: ACTIVE | Noted: 2017-05-03

## 2022-09-20 PROBLEM — I50.21 ACUTE HFREF (HEART FAILURE WITH REDUCED EJECTION FRACTION): Status: ACTIVE | Noted: 2022-09-19

## 2022-09-20 PROBLEM — H66.90 ACUTE OTITIS MEDIA: Status: ACTIVE | Noted: 2018-07-31

## 2022-09-20 PROBLEM — I50.41 ACUTE COMBINED SYSTOLIC AND DIASTOLIC CONGESTIVE HEART FAILURE (HCC): Status: ACTIVE | Noted: 2022-09-19

## 2022-09-20 PROBLEM — E66.812 CLASS 2 OBESITY WITH ALVEOLAR HYPOVENTILATION, SERIOUS COMORBIDITY, AND BODY MASS INDEX (BMI) OF 39.0 TO 39.9 IN ADULT: Chronic | Status: RESOLVED | Noted: 2022-09-19 | Resolved: 2022-09-20

## 2022-09-20 PROBLEM — E66.2 CLASS 2 OBESITY WITH ALVEOLAR HYPOVENTILATION, SERIOUS COMORBIDITY, AND BODY MASS INDEX (BMI) OF 39.0 TO 39.9 IN ADULT: Chronic | Status: RESOLVED | Noted: 2022-09-19 | Resolved: 2022-09-20

## 2022-09-20 PROBLEM — M25.472 ANKLE EFFUSION, LEFT: Status: ACTIVE | Noted: 2017-05-04

## 2022-09-20 PROBLEM — I36.1 NONRHEUMATIC TRICUSPID VALVE REGURGITATION: Status: ACTIVE | Noted: 2022-09-20

## 2022-09-20 PROBLEM — R00.0 TACHYCARDIA: Status: ACTIVE | Noted: 2022-09-20

## 2022-09-20 PROBLEM — E66.2 CLASS 2 OBESITY WITH ALVEOLAR HYPOVENTILATION, SERIOUS COMORBIDITY, AND BODY MASS INDEX (BMI) OF 39.0 TO 39.9 IN ADULT: Chronic | Status: ACTIVE | Noted: 2022-09-19

## 2022-09-20 PROBLEM — I27.20 PULMONARY HYPERTENSION: Status: ACTIVE | Noted: 2022-09-20

## 2022-09-20 LAB
ALBUMIN SERPL-MCNC: 3.8 G/DL (ref 3.5–5.2)
ALBUMIN/GLOB SERPL: 1.3 G/DL
ALP SERPL-CCNC: 72 U/L (ref 39–117)
ALT SERPL W P-5'-P-CCNC: 34 U/L (ref 1–41)
ANION GAP SERPL CALCULATED.3IONS-SCNC: 12 MMOL/L (ref 5–15)
AST SERPL-CCNC: 31 U/L (ref 1–40)
BILIRUB SERPL-MCNC: 1.8 MG/DL (ref 0–1.2)
BUN SERPL-MCNC: 15 MG/DL (ref 6–20)
BUN/CREAT SERPL: 12.3 (ref 7–25)
CALCIUM SPEC-SCNC: 8.8 MG/DL (ref 8.6–10.5)
CHLORIDE SERPL-SCNC: 104 MMOL/L (ref 98–107)
CO2 SERPL-SCNC: 23 MMOL/L (ref 22–29)
CREAT SERPL-MCNC: 1.22 MG/DL (ref 0.76–1.27)
EGFRCR SERPLBLD CKD-EPI 2021: 75 ML/MIN/1.73
GLOBULIN UR ELPH-MCNC: 3 GM/DL
GLUCOSE SERPL-MCNC: 126 MG/DL (ref 65–99)
POTASSIUM SERPL-SCNC: 4.2 MMOL/L (ref 3.5–5.2)
PROT SERPL-MCNC: 6.8 G/DL (ref 6–8.5)
SODIUM SERPL-SCNC: 139 MMOL/L (ref 136–145)

## 2022-09-20 PROCEDURE — 99233 SBSQ HOSP IP/OBS HIGH 50: CPT | Performed by: INTERNAL MEDICINE

## 2022-09-20 PROCEDURE — 25010000002 FUROSEMIDE PER 20 MG: Performed by: INTERNAL MEDICINE

## 2022-09-20 PROCEDURE — 25010000002 ENOXAPARIN PER 10 MG: Performed by: HOSPITALIST

## 2022-09-20 PROCEDURE — 80053 COMPREHEN METABOLIC PANEL: CPT | Performed by: HOSPITALIST

## 2022-09-20 RX ORDER — SODIUM CHLORIDE 0.9 % (FLUSH) 0.9 %
3 SYRINGE (ML) INJECTION EVERY 12 HOURS SCHEDULED
Status: CANCELLED | OUTPATIENT
Start: 2022-09-20

## 2022-09-20 RX ORDER — SODIUM CHLORIDE 0.9 % (FLUSH) 0.9 %
3-10 SYRINGE (ML) INJECTION AS NEEDED
Status: CANCELLED | OUTPATIENT
Start: 2022-09-20

## 2022-09-20 RX ORDER — ATORVASTATIN CALCIUM 40 MG/1
40 TABLET, FILM COATED ORAL NIGHTLY
Status: DISCONTINUED | OUTPATIENT
Start: 2022-09-20 | End: 2022-09-23 | Stop reason: HOSPADM

## 2022-09-20 RX ADMIN — ENOXAPARIN SODIUM 40 MG: 100 INJECTION SUBCUTANEOUS at 17:03

## 2022-09-20 RX ADMIN — Medication 10 ML: at 20:00

## 2022-09-20 RX ADMIN — FUROSEMIDE 40 MG: 10 INJECTION, SOLUTION INTRAMUSCULAR; INTRAVENOUS at 08:39

## 2022-09-20 RX ADMIN — Medication 10 ML: at 08:39

## 2022-09-20 RX ADMIN — CARVEDILOL 3.12 MG: 3.12 TABLET, FILM COATED ORAL at 08:40

## 2022-09-20 RX ADMIN — LISINOPRIL 10 MG: 5 TABLET ORAL at 08:40

## 2022-09-20 RX ADMIN — CARVEDILOL 3.12 MG: 3.12 TABLET, FILM COATED ORAL at 17:03

## 2022-09-20 RX ADMIN — ENOXAPARIN SODIUM 40 MG: 100 INJECTION SUBCUTANEOUS at 04:55

## 2022-09-20 RX ADMIN — PANTOPRAZOLE SODIUM 40 MG: 40 TABLET, DELAYED RELEASE ORAL at 05:00

## 2022-09-20 RX ADMIN — ASPIRIN 81 MG: 81 TABLET, COATED ORAL at 08:40

## 2022-09-20 RX ADMIN — ATORVASTATIN CALCIUM 40 MG: 40 TABLET, FILM COATED ORAL at 20:00

## 2022-09-21 LAB
ANION GAP SERPL CALCULATED.3IONS-SCNC: 9 MMOL/L (ref 5–15)
ARTERIAL PATENCY WRIST A: ABNORMAL
ARTERIAL PATENCY WRIST A: NORMAL
ATMOSPHERIC PRESS: ABNORMAL MM[HG]
BASE DEFICIT: 2.6 MEQ/LITER
BASE EXCESS BLDA CALC-SCNC: -0.1 MMOL/L (ref 0–3)
BASE EXCESS BLDA CALC-SCNC: 2 MMOL/L (ref 0–3)
BDY SITE: ABNORMAL
BDY SITE: NORMAL
BUN SERPL-MCNC: 17 MG/DL (ref 6–20)
BUN/CREAT SERPL: 13.7 (ref 7–25)
CALCIUM SPEC-SCNC: 8.8 MG/DL (ref 8.6–10.5)
CHLORIDE SERPL-SCNC: 101 MMOL/L (ref 98–107)
CO2 BLDA-SCNC: 23.5 MMOL/L (ref 22–29)
CO2 BLDA-SCNC: 28.9 MMOL/L (ref 22–29)
CO2 SERPL-SCNC: 26 MMOL/L (ref 22–29)
CREAT SERPL-MCNC: 1.24 MG/DL (ref 0.76–1.27)
EGFRCR SERPLBLD CKD-EPI 2021: 73.5 ML/MIN/1.73
GLUCOSE SERPL-MCNC: 124 MG/DL (ref 65–99)
HCO3 BLDA-SCNC: 22.6 MMOL/L (ref 21–28)
HCO3 MIXED: 27.5 MMOL/L (ref 21–29)
HEMODILUTION: NO
HEMODILUTION: NO
INHALED O2 CONCENTRATION: 21 %
INHALED O2 CONCENTRATION: 21 %
MAGNESIUM SERPL-MCNC: 1.8 MG/DL (ref 1.6–2.6)
MODALITY: ABNORMAL
MODALITY: NORMAL
O2 SATURATION MIXED: 73.3 %
PCO2 BLDA: 30.7 MM HG (ref 35–48)
PCO2 MIXED: 45.1 MMHG (ref 35–51)
PH BLDA: 7.47 PH UNITS (ref 7.35–7.45)
PH MIXED: 7.39 PH UNITS (ref 7.32–7.45)
PO2 BLDA: 98.2 MM HG (ref 83–108)
PO2 MIXED: 39.3 MMHG
POTASSIUM SERPL-SCNC: 4.1 MMOL/L (ref 3.5–5.2)
SAO2 % BLDCOA: 98.2 % (ref 94–98)
SODIUM SERPL-SCNC: 136 MMOL/L (ref 136–145)

## 2022-09-21 PROCEDURE — C1751 CATH, INF, PER/CENT/MIDLINE: HCPCS | Performed by: INTERNAL MEDICINE

## 2022-09-21 PROCEDURE — 94761 N-INVAS EAR/PLS OXIMETRY MLT: CPT

## 2022-09-21 PROCEDURE — 80048 BASIC METABOLIC PNL TOTAL CA: CPT | Performed by: HOSPITALIST

## 2022-09-21 PROCEDURE — 25010000002 ENOXAPARIN PER 10 MG: Performed by: HOSPITALIST

## 2022-09-21 PROCEDURE — 25010000002 FENTANYL CITRATE (PF) 100 MCG/2ML SOLUTION: Performed by: INTERNAL MEDICINE

## 2022-09-21 PROCEDURE — C1769 GUIDE WIRE: HCPCS | Performed by: INTERNAL MEDICINE

## 2022-09-21 PROCEDURE — 25010000002 FUROSEMIDE PER 20 MG: Performed by: INTERNAL MEDICINE

## 2022-09-21 PROCEDURE — 83735 ASSAY OF MAGNESIUM: CPT | Performed by: HOSPITALIST

## 2022-09-21 PROCEDURE — 93460 R&L HRT ART/VENTRICLE ANGIO: CPT | Performed by: INTERNAL MEDICINE

## 2022-09-21 PROCEDURE — C1894 INTRO/SHEATH, NON-LASER: HCPCS | Performed by: INTERNAL MEDICINE

## 2022-09-21 PROCEDURE — 82803 BLOOD GASES ANY COMBINATION: CPT

## 2022-09-21 PROCEDURE — 25010000002 HEPARIN (PORCINE) PER 1000 UNITS: Performed by: INTERNAL MEDICINE

## 2022-09-21 PROCEDURE — 25010000002 MIDAZOLAM PER 1 MG: Performed by: INTERNAL MEDICINE

## 2022-09-21 PROCEDURE — 4A023N8 MEASUREMENT OF CARDIAC SAMPLING AND PRESSURE, BILATERAL, PERCUTANEOUS APPROACH: ICD-10-PCS | Performed by: INTERNAL MEDICINE

## 2022-09-21 PROCEDURE — 85025 COMPLETE CBC W/AUTO DIFF WBC: CPT | Performed by: HOSPITALIST

## 2022-09-21 PROCEDURE — 99152 MOD SED SAME PHYS/QHP 5/>YRS: CPT | Performed by: INTERNAL MEDICINE

## 2022-09-21 PROCEDURE — 99232 SBSQ HOSP IP/OBS MODERATE 35: CPT | Performed by: INTERNAL MEDICINE

## 2022-09-21 PROCEDURE — 36600 WITHDRAWAL OF ARTERIAL BLOOD: CPT

## 2022-09-21 PROCEDURE — B2111ZZ FLUOROSCOPY OF MULTIPLE CORONARY ARTERIES USING LOW OSMOLAR CONTRAST: ICD-10-PCS | Performed by: INTERNAL MEDICINE

## 2022-09-21 PROCEDURE — 0 IOPAMIDOL PER 1 ML: Performed by: INTERNAL MEDICINE

## 2022-09-21 RX ORDER — NICARDIPINE HYDROCHLORIDE 2.5 MG/ML
INJECTION INTRAVENOUS AS NEEDED
Status: DISCONTINUED | OUTPATIENT
Start: 2022-09-21 | End: 2022-09-21 | Stop reason: HOSPADM

## 2022-09-21 RX ORDER — DIPHENHYDRAMINE HCL 25 MG
25 CAPSULE ORAL EVERY 6 HOURS PRN
Status: DISCONTINUED | OUTPATIENT
Start: 2022-09-21 | End: 2022-09-23 | Stop reason: HOSPADM

## 2022-09-21 RX ORDER — SODIUM CHLORIDE 9 MG/ML
INJECTION, SOLUTION INTRAVENOUS CONTINUOUS PRN
Status: COMPLETED | OUTPATIENT
Start: 2022-09-21 | End: 2022-09-21

## 2022-09-21 RX ORDER — MIDAZOLAM HYDROCHLORIDE 1 MG/ML
INJECTION INTRAMUSCULAR; INTRAVENOUS AS NEEDED
Status: DISCONTINUED | OUTPATIENT
Start: 2022-09-21 | End: 2022-09-21 | Stop reason: HOSPADM

## 2022-09-21 RX ORDER — FUROSEMIDE 10 MG/ML
40 INJECTION INTRAMUSCULAR; INTRAVENOUS EVERY 12 HOURS
Status: DISCONTINUED | OUTPATIENT
Start: 2022-09-21 | End: 2022-09-23

## 2022-09-21 RX ORDER — ACETAMINOPHEN 325 MG/1
650 TABLET ORAL EVERY 4 HOURS PRN
Status: DISCONTINUED | OUTPATIENT
Start: 2022-09-21 | End: 2022-09-23 | Stop reason: HOSPADM

## 2022-09-21 RX ORDER — FENTANYL CITRATE 50 UG/ML
INJECTION, SOLUTION INTRAMUSCULAR; INTRAVENOUS AS NEEDED
Status: DISCONTINUED | OUTPATIENT
Start: 2022-09-21 | End: 2022-09-21 | Stop reason: HOSPADM

## 2022-09-21 RX ORDER — CARVEDILOL 6.25 MG/1
6.25 TABLET ORAL 2 TIMES DAILY WITH MEALS
Status: DISCONTINUED | OUTPATIENT
Start: 2022-09-21 | End: 2022-09-22

## 2022-09-21 RX ORDER — HEPARIN SODIUM 1000 [USP'U]/ML
INJECTION, SOLUTION INTRAVENOUS; SUBCUTANEOUS AS NEEDED
Status: DISCONTINUED | OUTPATIENT
Start: 2022-09-21 | End: 2022-09-21 | Stop reason: HOSPADM

## 2022-09-21 RX ORDER — LIDOCAINE HYDROCHLORIDE 20 MG/ML
INJECTION, SOLUTION INFILTRATION; PERINEURAL AS NEEDED
Status: DISCONTINUED | OUTPATIENT
Start: 2022-09-21 | End: 2022-09-21 | Stop reason: HOSPADM

## 2022-09-21 RX ORDER — NITROGLYCERIN 5 MG/ML
INJECTION, SOLUTION INTRAVENOUS AS NEEDED
Status: DISCONTINUED | OUTPATIENT
Start: 2022-09-21 | End: 2022-09-21 | Stop reason: HOSPADM

## 2022-09-21 RX ORDER — ONDANSETRON 2 MG/ML
4 INJECTION INTRAMUSCULAR; INTRAVENOUS EVERY 6 HOURS PRN
Status: DISCONTINUED | OUTPATIENT
Start: 2022-09-21 | End: 2022-09-23 | Stop reason: HOSPADM

## 2022-09-21 RX ORDER — ONDANSETRON 4 MG/1
4 TABLET, FILM COATED ORAL EVERY 6 HOURS PRN
Status: DISCONTINUED | OUTPATIENT
Start: 2022-09-21 | End: 2022-09-23 | Stop reason: HOSPADM

## 2022-09-21 RX ADMIN — FUROSEMIDE 40 MG: 10 INJECTION, SOLUTION INTRAMUSCULAR; INTRAVENOUS at 09:53

## 2022-09-21 RX ADMIN — ASPIRIN 81 MG: 81 TABLET, COATED ORAL at 09:53

## 2022-09-21 RX ADMIN — CARVEDILOL 6.25 MG: 6.25 TABLET, FILM COATED ORAL at 18:34

## 2022-09-21 RX ADMIN — LISINOPRIL 10 MG: 5 TABLET ORAL at 09:53

## 2022-09-21 RX ADMIN — CARVEDILOL 3.12 MG: 3.12 TABLET, FILM COATED ORAL at 09:53

## 2022-09-21 RX ADMIN — ATORVASTATIN CALCIUM 40 MG: 40 TABLET, FILM COATED ORAL at 20:41

## 2022-09-21 RX ADMIN — Medication 10 ML: at 09:54

## 2022-09-21 RX ADMIN — ENOXAPARIN SODIUM 40 MG: 100 INJECTION SUBCUTANEOUS at 04:08

## 2022-09-21 RX ADMIN — FUROSEMIDE 40 MG: 10 INJECTION, SOLUTION INTRAMUSCULAR; INTRAVENOUS at 21:18

## 2022-09-21 RX ADMIN — Medication 10 ML: at 20:44

## 2022-09-22 LAB
ANION GAP SERPL CALCULATED.3IONS-SCNC: 14 MMOL/L (ref 5–15)
BASOPHILS # BLD AUTO: 0.1 10*3/MM3 (ref 0–0.2)
BASOPHILS NFR BLD AUTO: 0.6 % (ref 0–1.5)
BUN SERPL-MCNC: 15 MG/DL (ref 6–20)
BUN/CREAT SERPL: 12.5 (ref 7–25)
CALCIUM SPEC-SCNC: 9 MG/DL (ref 8.6–10.5)
CHLORIDE SERPL-SCNC: 101 MMOL/L (ref 98–107)
CO2 SERPL-SCNC: 23 MMOL/L (ref 22–29)
CREAT SERPL-MCNC: 1.2 MG/DL (ref 0.76–1.27)
DEPRECATED RDW RBC AUTO: 45.9 FL (ref 37–54)
EGFRCR SERPLBLD CKD-EPI 2021: 76.5 ML/MIN/1.73
EOSINOPHIL # BLD AUTO: 0.2 10*3/MM3 (ref 0–0.4)
EOSINOPHIL NFR BLD AUTO: 2.1 % (ref 0.3–6.2)
ERYTHROCYTE [DISTWIDTH] IN BLOOD BY AUTOMATED COUNT: 14.6 % (ref 12.3–15.4)
GLUCOSE SERPL-MCNC: 129 MG/DL (ref 65–99)
HCT VFR BLD AUTO: 43 % (ref 37.5–51)
HGB BLD-MCNC: 13.9 G/DL (ref 13–17.7)
LYMPHOCYTES # BLD AUTO: 1.7 10*3/MM3 (ref 0.7–3.1)
LYMPHOCYTES NFR BLD AUTO: 16 % (ref 19.6–45.3)
MCH RBC QN AUTO: 28.9 PG (ref 26.6–33)
MCHC RBC AUTO-ENTMCNC: 32.4 G/DL (ref 31.5–35.7)
MCV RBC AUTO: 89.2 FL (ref 79–97)
MONOCYTES # BLD AUTO: 0.7 10*3/MM3 (ref 0.1–0.9)
MONOCYTES NFR BLD AUTO: 6.7 % (ref 5–12)
NEUTROPHILS NFR BLD AUTO: 7.9 10*3/MM3 (ref 1.7–7)
NEUTROPHILS NFR BLD AUTO: 74.6 % (ref 42.7–76)
NRBC BLD AUTO-RTO: 0.1 /100 WBC (ref 0–0.2)
PLATELET # BLD AUTO: 560 10*3/MM3 (ref 140–450)
PMV BLD AUTO: 8 FL (ref 6–12)
POTASSIUM SERPL-SCNC: 4.1 MMOL/L (ref 3.5–5.2)
RBC # BLD AUTO: 4.82 10*6/MM3 (ref 4.14–5.8)
SODIUM SERPL-SCNC: 138 MMOL/L (ref 136–145)
WBC NRBC COR # BLD: 10.5 10*3/MM3 (ref 3.4–10.8)

## 2022-09-22 PROCEDURE — 99232 SBSQ HOSP IP/OBS MODERATE 35: CPT | Performed by: INTERNAL MEDICINE

## 2022-09-22 PROCEDURE — 25010000002 ENOXAPARIN PER 10 MG: Performed by: INTERNAL MEDICINE

## 2022-09-22 PROCEDURE — 25010000002 FUROSEMIDE PER 20 MG: Performed by: INTERNAL MEDICINE

## 2022-09-22 RX ORDER — CARVEDILOL 6.25 MG/1
12.5 TABLET ORAL 2 TIMES DAILY WITH MEALS
Status: DISCONTINUED | OUTPATIENT
Start: 2022-09-22 | End: 2022-09-23 | Stop reason: HOSPADM

## 2022-09-22 RX ADMIN — LISINOPRIL 10 MG: 5 TABLET ORAL at 08:13

## 2022-09-22 RX ADMIN — ATORVASTATIN CALCIUM 40 MG: 40 TABLET, FILM COATED ORAL at 20:18

## 2022-09-22 RX ADMIN — CARVEDILOL 12.5 MG: 6.25 TABLET, FILM COATED ORAL at 17:05

## 2022-09-22 RX ADMIN — Medication 10 ML: at 08:13

## 2022-09-22 RX ADMIN — Medication 10 ML: at 20:20

## 2022-09-22 RX ADMIN — ENOXAPARIN SODIUM 40 MG: 100 INJECTION SUBCUTANEOUS at 04:20

## 2022-09-22 RX ADMIN — ENOXAPARIN SODIUM 40 MG: 100 INJECTION SUBCUTANEOUS at 17:05

## 2022-09-22 RX ADMIN — FUROSEMIDE 40 MG: 10 INJECTION, SOLUTION INTRAMUSCULAR; INTRAVENOUS at 10:28

## 2022-09-22 RX ADMIN — FUROSEMIDE 40 MG: 10 INJECTION, SOLUTION INTRAMUSCULAR; INTRAVENOUS at 20:17

## 2022-09-22 RX ADMIN — ASPIRIN 81 MG: 81 TABLET, COATED ORAL at 08:13

## 2022-09-22 RX ADMIN — CARVEDILOL 6.25 MG: 6.25 TABLET, FILM COATED ORAL at 08:13

## 2022-09-23 VITALS
BODY MASS INDEX: 38.71 KG/M2 | OXYGEN SATURATION: 96 % | DIASTOLIC BLOOD PRESSURE: 78 MMHG | WEIGHT: 311.29 LBS | SYSTOLIC BLOOD PRESSURE: 118 MMHG | HEIGHT: 75 IN | RESPIRATION RATE: 18 BRPM | TEMPERATURE: 97.6 F | HEART RATE: 88 BPM

## 2022-09-23 LAB
ANION GAP SERPL CALCULATED.3IONS-SCNC: 9 MMOL/L (ref 5–15)
BASOPHILS # BLD AUTO: 0.1 10*3/MM3 (ref 0–0.2)
BASOPHILS NFR BLD AUTO: 1 % (ref 0–1.5)
BUN SERPL-MCNC: 15 MG/DL (ref 6–20)
BUN/CREAT SERPL: 12.4 (ref 7–25)
CALCIUM SPEC-SCNC: 8.7 MG/DL (ref 8.6–10.5)
CHLORIDE SERPL-SCNC: 100 MMOL/L (ref 98–107)
CO2 SERPL-SCNC: 29 MMOL/L (ref 22–29)
CREAT SERPL-MCNC: 1.21 MG/DL (ref 0.76–1.27)
DEPRECATED RDW RBC AUTO: 46.8 FL (ref 37–54)
EGFRCR SERPLBLD CKD-EPI 2021: 75.7 ML/MIN/1.73
EOSINOPHIL # BLD AUTO: 0.4 10*3/MM3 (ref 0–0.4)
EOSINOPHIL NFR BLD AUTO: 3.7 % (ref 0.3–6.2)
ERYTHROCYTE [DISTWIDTH] IN BLOOD BY AUTOMATED COUNT: 14.8 % (ref 12.3–15.4)
GLUCOSE SERPL-MCNC: 118 MG/DL (ref 65–99)
HCT VFR BLD AUTO: 41.8 % (ref 37.5–51)
HGB BLD-MCNC: 13.3 G/DL (ref 13–17.7)
LYMPHOCYTES # BLD AUTO: 2 10*3/MM3 (ref 0.7–3.1)
LYMPHOCYTES NFR BLD AUTO: 20.9 % (ref 19.6–45.3)
MCH RBC QN AUTO: 28.9 PG (ref 26.6–33)
MCHC RBC AUTO-ENTMCNC: 31.7 G/DL (ref 31.5–35.7)
MCV RBC AUTO: 91.1 FL (ref 79–97)
MONOCYTES # BLD AUTO: 0.8 10*3/MM3 (ref 0.1–0.9)
MONOCYTES NFR BLD AUTO: 8.2 % (ref 5–12)
NEUTROPHILS NFR BLD AUTO: 6.3 10*3/MM3 (ref 1.7–7)
NEUTROPHILS NFR BLD AUTO: 66.2 % (ref 42.7–76)
NRBC BLD AUTO-RTO: 0.1 /100 WBC (ref 0–0.2)
PLATELET # BLD AUTO: 528 10*3/MM3 (ref 140–450)
PMV BLD AUTO: 8.4 FL (ref 6–12)
POTASSIUM SERPL-SCNC: 4.4 MMOL/L (ref 3.5–5.2)
RBC # BLD AUTO: 4.59 10*6/MM3 (ref 4.14–5.8)
SODIUM SERPL-SCNC: 138 MMOL/L (ref 136–145)
WBC NRBC COR # BLD: 9.5 10*3/MM3 (ref 3.4–10.8)

## 2022-09-23 PROCEDURE — 85025 COMPLETE CBC W/AUTO DIFF WBC: CPT | Performed by: INTERNAL MEDICINE

## 2022-09-23 PROCEDURE — 25010000002 FUROSEMIDE PER 20 MG: Performed by: INTERNAL MEDICINE

## 2022-09-23 PROCEDURE — 25010000002 ENOXAPARIN PER 10 MG: Performed by: INTERNAL MEDICINE

## 2022-09-23 PROCEDURE — 99232 SBSQ HOSP IP/OBS MODERATE 35: CPT | Performed by: INTERNAL MEDICINE

## 2022-09-23 PROCEDURE — 80048 BASIC METABOLIC PNL TOTAL CA: CPT | Performed by: INTERNAL MEDICINE

## 2022-09-23 RX ORDER — LISINOPRIL 10 MG/1
10 TABLET ORAL
Qty: 30 TABLET | Refills: 0 | Status: SHIPPED | OUTPATIENT
Start: 2022-09-24 | End: 2022-10-18 | Stop reason: SDUPTHER

## 2022-09-23 RX ORDER — FUROSEMIDE 40 MG/1
40 TABLET ORAL 2 TIMES DAILY
Qty: 60 TABLET | Refills: 0 | Status: SHIPPED | OUTPATIENT
Start: 2022-09-23 | End: 2022-10-18 | Stop reason: SDUPTHER

## 2022-09-23 RX ORDER — CARVEDILOL 12.5 MG/1
12.5 TABLET ORAL 2 TIMES DAILY WITH MEALS
Qty: 60 TABLET | Refills: 0 | Status: SHIPPED | OUTPATIENT
Start: 2022-09-23 | End: 2022-10-18 | Stop reason: SDUPTHER

## 2022-09-23 RX ORDER — FLUTICASONE PROPIONATE 50 MCG
2 SPRAY, SUSPENSION (ML) NASAL DAILY
Qty: 16 G | Refills: 0 | Status: SHIPPED | OUTPATIENT
Start: 2022-09-23

## 2022-09-23 RX ORDER — FUROSEMIDE 40 MG/1
40 TABLET ORAL
Status: DISCONTINUED | OUTPATIENT
Start: 2022-09-23 | End: 2022-09-23 | Stop reason: HOSPADM

## 2022-09-23 RX ORDER — ASPIRIN 81 MG/1
81 TABLET ORAL DAILY
Qty: 30 TABLET | Refills: 0 | Status: SHIPPED | OUTPATIENT
Start: 2022-09-24

## 2022-09-23 RX ORDER — ATORVASTATIN CALCIUM 40 MG/1
40 TABLET, FILM COATED ORAL NIGHTLY
Qty: 30 TABLET | Refills: 0 | Status: SHIPPED | OUTPATIENT
Start: 2022-09-23 | End: 2022-10-18 | Stop reason: SDUPTHER

## 2022-09-23 RX ADMIN — ENOXAPARIN SODIUM 40 MG: 100 INJECTION SUBCUTANEOUS at 03:56

## 2022-09-23 RX ADMIN — LISINOPRIL 10 MG: 5 TABLET ORAL at 09:07

## 2022-09-23 RX ADMIN — CARVEDILOL 12.5 MG: 6.25 TABLET, FILM COATED ORAL at 09:08

## 2022-09-23 RX ADMIN — ASPIRIN 81 MG: 81 TABLET, COATED ORAL at 09:08

## 2022-09-23 RX ADMIN — FUROSEMIDE 40 MG: 40 TABLET ORAL at 16:07

## 2022-09-23 RX ADMIN — Medication 10 ML: at 09:08

## 2022-09-23 RX ADMIN — FUROSEMIDE 40 MG: 10 INJECTION, SOLUTION INTRAMUSCULAR; INTRAVENOUS at 09:08

## 2022-09-24 ENCOUNTER — READMISSION MANAGEMENT (OUTPATIENT)
Dept: CALL CENTER | Facility: HOSPITAL | Age: 47
End: 2022-09-24

## 2022-09-24 NOTE — OUTREACH NOTE
Prep Survey    Flowsheet Row Responses   [facility] facility patient discharged from? [facility]   Is LACE score < 7 ? No   Emergency Room discharge w/ pulse ox? No   Eligibility Readm Mgmt   Discharge diagnosis Acute heart failure exacerbation   Does the patient have one of the following disease processes/diagnoses(primary or secondary)? CHF   Does the patient have Home health ordered? No   Is there a DME ordered? No   Prep survey completed? Yes          PEARL LEON - Registered Nurse

## 2022-09-28 ENCOUNTER — READMISSION MANAGEMENT (OUTPATIENT)
Dept: CALL CENTER | Facility: HOSPITAL | Age: 47
End: 2022-09-28

## 2022-09-28 NOTE — OUTREACH NOTE
CHF Week 1 Survey    Flowsheet Row Responses   [facility] facility patient discharged from? [facility]   Does the patient have one of the following disease processes/diagnoses(primary or secondary)? CHF   CHF Week 1 attempt successful? No   Unsuccessful attempts Attempt 1          LACEY REVELES - Registered Nurse

## 2022-10-04 ENCOUNTER — TELEPHONE (OUTPATIENT)
Dept: CARDIOLOGY | Facility: CLINIC | Age: 47
End: 2022-10-04

## 2022-10-04 DIAGNOSIS — I42.0 CARDIOMYOPATHY, DILATED: Primary | ICD-10-CM

## 2022-10-04 DIAGNOSIS — I50.42 CHRONIC COMBINED SYSTOLIC AND DIASTOLIC CHF (CONGESTIVE HEART FAILURE): ICD-10-CM

## 2022-10-10 ENCOUNTER — READMISSION MANAGEMENT (OUTPATIENT)
Dept: CALL CENTER | Facility: HOSPITAL | Age: 47
End: 2022-10-10

## 2022-10-12 ENCOUNTER — TELEPHONE (OUTPATIENT)
Dept: CARDIAC REHAB | Facility: HOSPITAL | Age: 47
End: 2022-10-12

## 2022-10-12 NOTE — TELEPHONE ENCOUNTER
OOP left: 5940.00  Co-pay: 50.00  Once OOP met, co-pay goes away  60v/year, addt'l 10v available based on medical necessity  Ref #: [id number]  ~linette

## 2022-10-13 ENCOUNTER — TRANSCRIBE ORDERS (OUTPATIENT)
Dept: CARDIAC REHAB | Facility: HOSPITAL | Age: 47
End: 2022-10-13

## 2022-10-13 DIAGNOSIS — I50.31 ACUTE DIASTOLIC CHF (CONGESTIVE HEART FAILURE): Primary | ICD-10-CM

## 2022-10-16 LAB — QT INTERVAL: 382 MS

## 2022-10-17 ENCOUNTER — READMISSION MANAGEMENT (OUTPATIENT)
Dept: CALL CENTER | Facility: HOSPITAL | Age: 47
End: 2022-10-17

## 2022-10-17 NOTE — OUTREACH NOTE
CHF Week 4 Survey    Flowsheet Row Responses   [facility] facility patient discharged from? [facility]   Does the patient have one of the following disease processes/diagnoses(primary or secondary)? CHF   Week 4 attempt successful? No          JERARDO FITZGERALD - Registered Nurse

## 2022-10-17 NOTE — PROGRESS NOTES
Encounter Date:10/18/2022      Patient ID: Misael Carney is a 46 y.o. male.    Chief Complaint:      History of Present Illness    46 y.o. male who was recently 9/22 admitted to the hospital due to complaints of difficulty breathing, dyspnea on exertion.  He works as a  and recently noted symptoms of difficulty breathing and felt like he had pneumonia without any fever or chills.  Lifting more than 5 pounds would make him out of breath and he could only walk up to 5-10 steps without getting severe shortness of breath.  The symptoms were associated with midsternal chest pain.  He denies orthopnea or PND but does report recent weight gain of at least 10 pounds in the last 2 weeks.  He does not have sleep apnea but he has not had any tests.  While in the emergency room his troponin was negative, proBNP was elevated 4500, renal function was normal and D-dimer was -0.52.  His ECG showed new left bundle branch block when compared to his previous ECG from 2017.  His echocardiogram showed significantly reduced LV function with EF of 24%, moderate pulmonary hypertension.  Cardiac catheterization ruled out any obstructive coronary disease however his LVEDP was 35 mmHg  He was treated with IV diuretics which were later switched to oral.  During the hospitalization his weight decreased from 312 pounds to 294 pounds.  ACE inhibitor and beta-blocker were started however we were unable to uptitrate due to low blood pressure.  Today he comes in feeling well.  He weighed himself this morning at 289 pounds however in the office he weighs 296 pounds.  He reports compliance with all his medications.  His shortness of breath is improved.  He is able to walk longer than before.  He also able to lay flat in bed without any difficulty.        The following portions of the patient's history were reviewed and updated as appropriate: allergies, current medications, past family history, past medical history, past social  history, past surgical history and problem list.    Review of Systems   Constitutional: Negative for fever and malaise/fatigue.   Cardiovascular: Negative for chest pain, dyspnea on exertion and palpitations.   Respiratory: Negative for cough and shortness of breath.    Skin: Negative for rash.   Gastrointestinal: Negative for abdominal pain, nausea and vomiting.   Neurological: Negative for focal weakness and headaches.   All other systems reviewed and are negative.        Current Outpatient Medications:   •  aspirin 81 MG EC tablet, Take 1 tablet by mouth Daily., Disp: 30 tablet, Rfl: 0  •  atorvastatin (LIPITOR) 40 MG tablet, Take 1 tablet by mouth Every Night., Disp: 90 tablet, Rfl: 3  •  carvedilol (COREG) 12.5 MG tablet, Take 1 tablet by mouth 2 (Two) Times a Day With Meals., Disp: 180 tablet, Rfl: 3  •  fluticasone (Flonase) 50 MCG/ACT nasal spray, instill 2 sprays into the nostril(s) daily, Disp: 16 g, Rfl: 0  •  furosemide (LASIX) 40 MG tablet, Take 1 tablet by mouth 2 (Two) Times a Day., Disp: 180 tablet, Rfl: 3  •  lisinopril (PRINIVIL,ZESTRIL) 10 MG tablet, Take 1 tablet by mouth Daily., Disp: 90 tablet, Rfl: 3  •  spironolactone (ALDACTONE) 25 MG tablet, Take 1 tablet by mouth Daily., Disp: 90 tablet, Rfl: 11    Current outpatient and discharge medications have been reconciled for the patient.  Reviewed by: Ridge Lindsay MD       Allergies   Allergen Reactions   • Nsaids GI Intolerance       Family History   Problem Relation Age of Onset   • Asthma Mother    • Heart attack Father    • Heart disease Father    • Heart failure Father    • Hypertension Father        Past Surgical History:   Procedure Laterality Date   • ANKLE SURGERY Left 2016   • CARDIAC CATHETERIZATION Bilateral 09/21/2022    Procedure: Right and Left Heart Cath;  Surgeon: Ridge Lindsay MD;  Location: [facility] CATH IN[facility]ASIVE [facility];  Service: Cardiovascular;  Laterality: Bilateral;       Past Medical History:   Diagnosis Date   • Abnormal  "ECG 9/19/2022   • CHF (congestive heart failure) (HCC)    • Congenital heart disease 9/19/2022   • Coronary artery disease    • Diabetes mellitus (HCC)    • Peptic ulcer disease        Family History   Problem Relation Age of Onset   • Asthma Mother    • Heart attack Father    • Heart disease Father    • Heart failure Father    • Hypertension Father        Social History     Socioeconomic History   • Marital status: Single   Tobacco Use   • Smoking status: Never   • Smokeless tobacco: Never   Vaping Use   • Vaping Use: Never used   Substance and Sexual Activity   • Alcohol use: Not Currently     Comment: occ   • Drug use: Never   • Sexual activity: Yes     Partners: Female     Birth control/protection: Condom, Natural family planning/Rhythm         Procedures      Objective:       Physical Exam    /73   Pulse 82   Ht 190.5 cm (75\")   Wt 134 kg (296 lb)   SpO2 99%   BMI 37.00 kg/m²   The patient is alert, oriented and in no distress.    Vital signs as noted above.    Head and neck revealed no carotid bruits or jugular venous distension.  No thyromegaly or lymphadenopathy is present.    Lungs clear.  No wheezing.  Breath sounds are normal bilaterally.    Heart normal first and second heart sounds.  No murmur..  No pericardial rub is present.  No gallop is present.    Abdomen soft and nontender.  No organomegaly is present.    Extremities revealed good peripheral pulses without any pedal edema.    Skin warm and dry.    Musculoskeletal system is grossly normal.    CNS grossly normal.           Diagnosis Plan   1. Cardiomyopathy, dilated (HCC)  lisinopril (PRINIVIL,ZESTRIL) 10 MG tablet    furosemide (LASIX) 40 MG tablet    carvedilol (COREG) 12.5 MG tablet    atorvastatin (LIPITOR) 40 MG tablet    spironolactone (ALDACTONE) 25 MG tablet    Adult Transthoracic Echo Complete W/ Cont if Necessary Per Protocol      2. Acute combined systolic and diastolic congestive heart failure (HCC)  lisinopril " (PRINIVIL,ZESTRIL) 10 MG tablet    furosemide (LASIX) 40 MG tablet    carvedilol (COREG) 12.5 MG tablet    atorvastatin (LIPITOR) 40 MG tablet    spironolactone (ALDACTONE) 25 MG tablet    Adult Transthoracic Echo Complete W/ Cont if Necessary Per Protocol      3. Tachycardia        4. Pulmonary hypertension (HCC)        5. Class 2 obesity with alveolar hypoventilation, serious comorbidity, and body mass index (BMI) of 38.0 to 38.9 in adult (HCC)        LAB RESULTS (LAST 7 DAYS)    CBC        BMP        CMP         BNP        TROPONIN        CoAg        Creatinine Clearance  Estimated Creatinine Clearance: 114.6 mL/min (by C-G formula based on SCr of 1.21 mg/dL).    ABG        Radiology  No radiology results for the last day        Assessment and Plan       Diagnoses and all orders for this visit:    1. Cardiomyopathy, dilated (HCC) (Primary)  -     lisinopril (PRINIVIL,ZESTRIL) 10 MG tablet; Take 1 tablet by mouth Daily.  Dispense: 90 tablet; Refill: 3  -     furosemide (LASIX) 40 MG tablet; Take 1 tablet by mouth 2 (Two) Times a Day.  Dispense: 180 tablet; Refill: 3  -     carvedilol (COREG) 12.5 MG tablet; Take 1 tablet by mouth 2 (Two) Times a Day With Meals.  Dispense: 180 tablet; Refill: 3  -     atorvastatin (LIPITOR) 40 MG tablet; Take 1 tablet by mouth Every Night.  Dispense: 90 tablet; Refill: 3  -     spironolactone (ALDACTONE) 25 MG tablet; Take 1 tablet by mouth Daily.  Dispense: 90 tablet; Refill: 11  -     Adult Transthoracic Echo Complete W/ Cont if Necessary Per Protocol; Future  Heart failure with reduced ejection fraction  Continue to uptitrate GDMT  Added Aldactone today  Repeat echocardiogram in 3 months  Emphasized the importance of diet, medication compliance, provided education regarding diuretics  2. Acute combined systolic and diastolic congestive heart failure (HCC)  -     lisinopril (PRINIVIL,ZESTRIL) 10 MG tablet; Take 1 tablet by mouth Daily.  Dispense: 90 tablet; Refill: 3  -      furosemide (LASIX) 40 MG tablet; Take 1 tablet by mouth 2 (Two) Times a Day.  Dispense: 180 tablet; Refill: 3  -     carvedilol (COREG) 12.5 MG tablet; Take 1 tablet by mouth 2 (Two) Times a Day With Meals.  Dispense: 180 tablet; Refill: 3  -     atorvastatin (LIPITOR) 40 MG tablet; Take 1 tablet by mouth Every Night.  Dispense: 90 tablet; Refill: 3  -     spironolactone (ALDACTONE) 25 MG tablet; Take 1 tablet by mouth Daily.  Dispense: 90 tablet; Refill: 11  -     Adult Transthoracic Echo Complete W/ Cont if Necessary Per Protocol; Future  Previous EF 24% with moderate pulmonary hypertension  Repeat echocardiogram in 3 months  Currently on lisinopril, Coreg, added Aldactone  Continue furosemide 40 mg p.o. twice daily  ECG with left bundle branch block morphology with QRS of 150 ms  If repeat LV function is low he will benefit from BiV ICD  3. Tachycardia  Tachycardia has resolved with diuretics  4. Pulmonary hypertension (HCC)  Needs sleep study.  Likely secondary to sleep apnea  5. Class 2 obesity with alveolar hypoventilation, serious comorbidity, and body mass index (BMI) of 38.0 to 38.9 in adult (HCC)  I have provided weight loss counseling for the patient and discussed cardiac diet. I extensively discussed with the patient the cardiovascular risks associated with obesity and metabolic syndrome.  I gave ample time for questions and they were answered to their satisfaction.       I will see him in 6 months however his review was encouraged to use [employer] and send me messages regarding his weight and diuretic use.  I emphasized to take his Coreg after meals to avoid drop in blood pressure  I also educated him regarding spacing out lisinopril and Aldactone by at least 24 hours to avoid hypotension.  Encouraged weight loss and dietary changes

## 2022-10-18 ENCOUNTER — OFFICE VISIT (OUTPATIENT)
Dept: CARDIOLOGY | Facility: CLINIC | Age: 47
End: 2022-10-18

## 2022-10-18 VITALS
HEIGHT: 75 IN | WEIGHT: 296 LBS | BODY MASS INDEX: 36.8 KG/M2 | DIASTOLIC BLOOD PRESSURE: 73 MMHG | SYSTOLIC BLOOD PRESSURE: 112 MMHG | HEART RATE: 82 BPM | OXYGEN SATURATION: 99 %

## 2022-10-18 DIAGNOSIS — I27.20 PULMONARY HYPERTENSION: ICD-10-CM

## 2022-10-18 DIAGNOSIS — I42.0 CARDIOMYOPATHY, DILATED: Primary | ICD-10-CM

## 2022-10-18 DIAGNOSIS — I50.41 ACUTE COMBINED SYSTOLIC AND DIASTOLIC CONGESTIVE HEART FAILURE: ICD-10-CM

## 2022-10-18 DIAGNOSIS — E66.2 CLASS 2 OBESITY WITH ALVEOLAR HYPOVENTILATION, SERIOUS COMORBIDITY, AND BODY MASS INDEX (BMI) OF 38.0 TO 38.9 IN ADULT: ICD-10-CM

## 2022-10-18 DIAGNOSIS — R00.0 TACHYCARDIA: ICD-10-CM

## 2022-10-18 PROCEDURE — 99214 OFFICE O/P EST MOD 30 MIN: CPT | Performed by: INTERNAL MEDICINE

## 2022-10-18 RX ORDER — FUROSEMIDE 40 MG/1
40 TABLET ORAL 2 TIMES DAILY
Qty: 180 TABLET | Refills: 3 | Status: SHIPPED | OUTPATIENT
Start: 2022-10-18

## 2022-10-18 RX ORDER — ATORVASTATIN CALCIUM 40 MG/1
40 TABLET, FILM COATED ORAL NIGHTLY
Qty: 90 TABLET | Refills: 3 | Status: SHIPPED | OUTPATIENT
Start: 2022-10-18

## 2022-10-18 RX ORDER — LISINOPRIL 10 MG/1
10 TABLET ORAL
Qty: 90 TABLET | Refills: 3 | Status: SHIPPED | OUTPATIENT
Start: 2022-10-18

## 2022-10-18 RX ORDER — SPIRONOLACTONE 25 MG/1
25 TABLET ORAL DAILY
Qty: 90 TABLET | Refills: 11 | Status: SHIPPED | OUTPATIENT
Start: 2022-10-18

## 2022-10-18 RX ORDER — CARVEDILOL 12.5 MG/1
12.5 TABLET ORAL 2 TIMES DAILY WITH MEALS
Qty: 180 TABLET | Refills: 3 | Status: SHIPPED | OUTPATIENT
Start: 2022-10-18

## 2022-10-20 ENCOUNTER — APPOINTMENT (OUTPATIENT)
Dept: CARDIAC REHAB | Facility: HOSPITAL | Age: 47
End: 2022-10-20

## 2022-10-25 ENCOUNTER — APPOINTMENT (OUTPATIENT)
Dept: CARDIAC REHAB | Facility: HOSPITAL | Age: 47
End: 2022-10-25

## 2022-10-26 ENCOUNTER — APPOINTMENT (OUTPATIENT)
Dept: CARDIAC REHAB | Facility: HOSPITAL | Age: 47
End: 2022-10-26

## 2022-10-27 ENCOUNTER — APPOINTMENT (OUTPATIENT)
Dept: CARDIAC REHAB | Facility: HOSPITAL | Age: 47
End: 2022-10-27

## 2022-11-01 ENCOUNTER — APPOINTMENT (OUTPATIENT)
Dept: CARDIAC REHAB | Facility: HOSPITAL | Age: 47
End: 2022-11-01

## 2022-11-03 ENCOUNTER — APPOINTMENT (OUTPATIENT)
Dept: CARDIAC REHAB | Facility: HOSPITAL | Age: 47
End: 2022-11-03

## 2022-11-03 ENCOUNTER — TELEPHONE (OUTPATIENT)
Dept: CARDIOLOGY | Facility: CLINIC | Age: 47
End: 2022-11-03

## 2022-11-03 NOTE — TELEPHONE ENCOUNTER
Pt came by office to  paperwork for return to work and paid form fee. A copy of form wasn't made of the forms. Called pt and asked if he could bring the forms back to office and we can make a copy to be faxed as requested. Pt stated he would stop by today. He also stated that his employer sent him an email that they already received the paperwork.

## 2022-11-08 ENCOUNTER — APPOINTMENT (OUTPATIENT)
Dept: CARDIAC REHAB | Facility: HOSPITAL | Age: 47
End: 2022-11-08

## 2022-11-10 ENCOUNTER — APPOINTMENT (OUTPATIENT)
Dept: CARDIAC REHAB | Facility: HOSPITAL | Age: 47
End: 2022-11-10

## 2022-11-15 ENCOUNTER — APPOINTMENT (OUTPATIENT)
Dept: CARDIAC REHAB | Facility: HOSPITAL | Age: 47
End: 2022-11-15

## 2022-11-17 ENCOUNTER — APPOINTMENT (OUTPATIENT)
Dept: CARDIAC REHAB | Facility: HOSPITAL | Age: 47
End: 2022-11-17

## 2022-11-22 ENCOUNTER — OFFICE VISIT (OUTPATIENT)
Dept: CARDIAC REHAB | Facility: HOSPITAL | Age: 47
End: 2022-11-22

## 2022-11-22 DIAGNOSIS — I42.0 CARDIOMYOPATHY, DILATED: Primary | ICD-10-CM

## 2022-11-24 ENCOUNTER — APPOINTMENT (OUTPATIENT)
Dept: CARDIAC REHAB | Facility: HOSPITAL | Age: 47
End: 2022-11-24

## 2022-11-29 ENCOUNTER — APPOINTMENT (OUTPATIENT)
Dept: CARDIAC REHAB | Facility: HOSPITAL | Age: 47
End: 2022-11-29

## 2022-12-01 ENCOUNTER — APPOINTMENT (OUTPATIENT)
Dept: CARDIAC REHAB | Facility: HOSPITAL | Age: 47
End: 2022-12-01

## 2022-12-06 ENCOUNTER — APPOINTMENT (OUTPATIENT)
Dept: CARDIAC REHAB | Facility: HOSPITAL | Age: 47
End: 2022-12-06

## 2022-12-08 ENCOUNTER — APPOINTMENT (OUTPATIENT)
Dept: CARDIAC REHAB | Facility: HOSPITAL | Age: 47
End: 2022-12-08

## 2022-12-13 ENCOUNTER — APPOINTMENT (OUTPATIENT)
Dept: CARDIAC REHAB | Facility: HOSPITAL | Age: 47
End: 2022-12-13

## 2022-12-15 ENCOUNTER — APPOINTMENT (OUTPATIENT)
Dept: CARDIAC REHAB | Facility: HOSPITAL | Age: 47
End: 2022-12-15

## 2022-12-20 ENCOUNTER — OFFICE VISIT (OUTPATIENT)
Dept: CARDIAC REHAB | Facility: HOSPITAL | Age: 47
End: 2022-12-20

## 2022-12-20 DIAGNOSIS — I42.0 CARDIOMYOPATHY, DILATED: Primary | ICD-10-CM

## 2022-12-22 ENCOUNTER — APPOINTMENT (OUTPATIENT)
Dept: CARDIAC REHAB | Facility: HOSPITAL | Age: 47
End: 2022-12-22

## 2022-12-27 ENCOUNTER — APPOINTMENT (OUTPATIENT)
Dept: CARDIAC REHAB | Facility: HOSPITAL | Age: 47
End: 2022-12-27

## 2022-12-29 ENCOUNTER — APPOINTMENT (OUTPATIENT)
Dept: CARDIAC REHAB | Facility: HOSPITAL | Age: 47
End: 2022-12-29

## 2023-01-03 ENCOUNTER — APPOINTMENT (OUTPATIENT)
Dept: CARDIAC REHAB | Facility: HOSPITAL | Age: 48
End: 2023-01-03

## 2023-01-05 ENCOUNTER — APPOINTMENT (OUTPATIENT)
Dept: CARDIAC REHAB | Facility: HOSPITAL | Age: 48
End: 2023-01-05

## 2023-01-10 ENCOUNTER — APPOINTMENT (OUTPATIENT)
Dept: CARDIAC REHAB | Facility: HOSPITAL | Age: 48
End: 2023-01-10

## 2023-01-12 ENCOUNTER — APPOINTMENT (OUTPATIENT)
Dept: CARDIAC REHAB | Facility: HOSPITAL | Age: 48
End: 2023-01-12

## 2023-01-17 ENCOUNTER — APPOINTMENT (OUTPATIENT)
Dept: CARDIAC REHAB | Facility: HOSPITAL | Age: 48
End: 2023-01-17

## 2023-01-18 ENCOUNTER — HOSPITAL ENCOUNTER (OUTPATIENT)
Dept: CARDIOLOGY | Facility: HOSPITAL | Age: 48
Discharge: HOME | End: 2023-01-18
Admitting: INTERNAL MEDICINE
Payer: COMMERCIAL

## 2023-01-18 DIAGNOSIS — I42.0 CARDIOMYOPATHY, DILATED: ICD-10-CM

## 2023-01-18 DIAGNOSIS — I50.41 ACUTE COMBINED SYSTOLIC AND DIASTOLIC CONGESTIVE HEART FAILURE: ICD-10-CM

## 2023-01-18 LAB
BH CV ECHO MEAS - ACS: 2.08 CM
BH CV ECHO MEAS - AO MAX PG: 8 MMHG
BH CV ECHO MEAS - AO MEAN PG: 4.1 MMHG
BH CV ECHO MEAS - AO ROOT DIAM: 3.4 CM
BH CV ECHO MEAS - AO V2 MAX: 141.4 CM/SEC
BH CV ECHO MEAS - AO V2 VTI: 23.4 CM
BH CV ECHO MEAS - AVA(I,D): 2.15 CM2
BH CV ECHO MEAS - EDV(CUBED): 283.1 ML
BH CV ECHO MEAS - EDV(MOD-SP4): 176.5 ML
BH CV ECHO MEAS - EF(MOD-BP): 27 %
BH CV ECHO MEAS - EF(MOD-SP4): 27.1 %
BH CV ECHO MEAS - ESV(CUBED): 177.4 ML
BH CV ECHO MEAS - ESV(MOD-SP4): 128.7 ML
BH CV ECHO MEAS - FS: 14.4 %
BH CV ECHO MEAS - IVS/LVPW: 0.69 CM
BH CV ECHO MEAS - IVSD: 0.72 CM
BH CV ECHO MEAS - LA DIMENSION: 4.6 CM
BH CV ECHO MEAS - LV DIASTOLIC VOL/BSA (35-75): 68.6 CM2
BH CV ECHO MEAS - LV MASS(C)D: 245.2 GRAMS
BH CV ECHO MEAS - LV MAX PG: 2.8 MMHG
BH CV ECHO MEAS - LV MEAN PG: 1.5 MMHG
BH CV ECHO MEAS - LV SYSTOLIC VOL/BSA (12-30): 50 CM2
BH CV ECHO MEAS - LV V1 MAX: 83.3 CM/SEC
BH CV ECHO MEAS - LV V1 VTI: 16.5 CM
BH CV ECHO MEAS - LVIDD: 6.6 CM
BH CV ECHO MEAS - LVIDS: 5.6 CM
BH CV ECHO MEAS - LVOT AREA: 3 CM2
BH CV ECHO MEAS - LVOT DIAM: 1.97 CM
BH CV ECHO MEAS - LVPWD: 1.04 CM
BH CV ECHO MEAS - MV A MAX VEL: 69.9 CM/SEC
BH CV ECHO MEAS - MV DEC SLOPE: 261.2 CM/SEC2
BH CV ECHO MEAS - MV DEC TIME: 0.28 MSEC
BH CV ECHO MEAS - MV E MAX VEL: 74 CM/SEC
BH CV ECHO MEAS - MV E/A: 1.06
BH CV ECHO MEAS - MV MAX PG: 1.69 MMHG
BH CV ECHO MEAS - MV MEAN PG: 0.81 MMHG
BH CV ECHO MEAS - MV V2 VTI: 23.4 CM
BH CV ECHO MEAS - MVA(VTI): 2.15 CM2
BH CV ECHO MEAS - PA ACC TIME: 0.08 SEC
BH CV ECHO MEAS - PA PR(ACCEL): 43.2 MMHG
BH CV ECHO MEAS - QP/QS: 1.35
BH CV ECHO MEAS - RAP SYSTOLE: 3 MMHG
BH CV ECHO MEAS - RV MAX PG: 0.9 MMHG
BH CV ECHO MEAS - RV V1 MAX: 47.5 CM/SEC
BH CV ECHO MEAS - RV V1 VTI: 10.3 CM
BH CV ECHO MEAS - RVDD: 3.2 CM
BH CV ECHO MEAS - RVOT DIAM: 2.9 CM
BH CV ECHO MEAS - RVSP: 26.2 MMHG
BH CV ECHO MEAS - SI(MOD-SP4): 18.6 ML/M2
BH CV ECHO MEAS - SV(LVOT): 50.3 ML
BH CV ECHO MEAS - SV(MOD-SP4): 47.8 ML
BH CV ECHO MEAS - SV(RVOT): 67.8 ML
BH CV ECHO MEAS - TR MAX PG: 23.2 MMHG
BH CV ECHO MEAS - TR MAX VEL: 240.9 CM/SEC
MAXIMAL PREDICTED HEART RATE: 175 BPM
STRESS TARGET HR: 149 BPM

## 2023-01-18 PROCEDURE — 93306 TTE W/DOPPLER COMPLETE: CPT

## 2023-01-18 PROCEDURE — 93306 TTE W/DOPPLER COMPLETE: CPT | Performed by: INTERNAL MEDICINE

## 2023-01-19 ENCOUNTER — APPOINTMENT (OUTPATIENT)
Dept: CARDIAC REHAB | Facility: HOSPITAL | Age: 48
End: 2023-01-19

## 2023-01-24 ENCOUNTER — APPOINTMENT (OUTPATIENT)
Dept: CARDIAC REHAB | Facility: HOSPITAL | Age: 48
End: 2023-01-24

## 2023-01-25 ENCOUNTER — TELEPHONE (OUTPATIENT)
Dept: CARDIOLOGY | Facility: CLINIC | Age: 48
End: 2023-01-25
Payer: COMMERCIAL

## 2023-01-25 NOTE — TELEPHONE ENCOUNTER
PER JASMINA CHAHAL LEFT PT  TO CALL DR PEREA'S OFFICE TO SCHEDULE FOLLOW UP APPT WITHIN THE NEXT WEEK OR TWO TO DISCUSS ECHO RESULTS.

## 2023-01-25 NOTE — TELEPHONE ENCOUNTER
----- Message from RENY Hoffman sent at 1/23/2023 12:42 PM EST -----  Regarding: follow up appt  Please schedule a follow up appointment with Dr. Lindsay to discuss results of echocardiogram. He needs to be seen in the next week or two. Thank you!

## 2023-01-26 ENCOUNTER — APPOINTMENT (OUTPATIENT)
Dept: CARDIAC REHAB | Facility: HOSPITAL | Age: 48
End: 2023-01-26

## 2023-01-30 NOTE — PROGRESS NOTES
Encounter Date:01/31/2023      Patient ID: Misael Carney is a 47 y.o. male.    Chief Complaint:      History of Present Illness  46 y.o. male who was recently 9/22 admitted to the hospital due to complaints of difficulty breathing, dyspnea on exertion.  He works as a  and recently noted symptoms of difficulty breathing and felt like he had pneumonia without any fever or chills.  Lifting more than 5 pounds would make him out of breath and he could only walk up to 5-10 steps without getting severe shortness of breath.  The symptoms were associated with midsternal chest pain.  He denies orthopnea or PND but does report recent weight gain of at least 10 pounds in the last 2 weeks.  He does not have sleep apnea but he has not had any tests.  While in the emergency room his troponin was negative, proBNP was elevated 4500, renal function was normal and D-dimer was -0.52.  His ECG showed new left bundle branch block when compared to his previous ECG from 2017.  His echocardiogram showed significantly reduced LV function with EF of 24%, moderate pulmonary hypertension.  Cardiac catheterization ruled out any obstructive coronary disease however his LVEDP was 35 mmHg  He was treated with IV diuretics which were later switched to oral.  During the hospitalization his weight decreased from 312 pounds to 294 pounds.  Repeat echocardiogram in January 2023 shows EF of 27%.    Today he comes in feeling well.    He is compliant with all his medications including diuretics and has not gained or lost any weight.  He is back to working full-time and has no restrictions or limitations.  He is in NYHA class II-III      The following portions of the patient's history were reviewed and updated as appropriate: allergies, current medications, past family history, past medical history, past social history, past surgical history and problem list.    Review of Systems   Constitutional: Negative for malaise/fatigue.    Cardiovascular: Negative for chest pain, dyspnea on exertion, leg swelling and palpitations.   Respiratory: Negative for cough and shortness of breath.    Gastrointestinal: Negative for abdominal pain, nausea and vomiting.   Neurological: Negative for dizziness, focal weakness, headaches, light-headedness and numbness.   All other systems reviewed and are negative.        Current Outpatient Medications:   •  aspirin 81 MG EC tablet, Take 1 tablet by mouth Daily., Disp: 30 tablet, Rfl: 0  •  atorvastatin (LIPITOR) 40 MG tablet, Take 1 tablet by mouth Every Night., Disp: 90 tablet, Rfl: 3  •  carvedilol (COREG) 12.5 MG tablet, Take 1 tablet by mouth 2 (Two) Times a Day With Meals., Disp: 180 tablet, Rfl: 3  •  fluticasone (Flonase) 50 MCG/ACT nasal spray, instill 2 sprays into the nostril(s) daily, Disp: 16 g, Rfl: 0  •  furosemide (LASIX) 40 MG tablet, Take 1 tablet by mouth 2 (Two) Times a Day., Disp: 180 tablet, Rfl: 3  •  lisinopril (PRINIVIL,ZESTRIL) 10 MG tablet, Take 1 tablet by mouth Daily., Disp: 90 tablet, Rfl: 3  •  spironolactone (ALDACTONE) 25 MG tablet, Take 1 tablet by mouth Daily., Disp: 90 tablet, Rfl: 11    Current outpatient and discharge medications have been reconciled for the patient.  Reviewed by: Ridge Lindsay MD       Allergies   Allergen Reactions   • Nsaids GI Intolerance       Family History   Problem Relation Age of Onset   • Asthma Mother    • Heart attack Father    • Heart disease Father    • Heart failure Father    • Hypertension Father        Past Surgical History:   Procedure Laterality Date   • ANKLE SURGERY Left 2016   • CARDIAC CATHETERIZATION Bilateral 09/21/2022    Procedure: Right and Left Heart Cath;  Surgeon: Ridge Lindsay MD;  Location: [facility] INVASIVE LOCATION;  Service: Cardiovascular;  Laterality: Bilateral;       Past Medical History:   Diagnosis Date   • Abnormal ECG 9/19/2022   • CHF (congestive heart failure) (HCC)    • Congenital heart disease 9/19/2022   •  "Coronary artery disease    • Diabetes mellitus (HCC)    • Peptic ulcer disease        Family History   Problem Relation Age of Onset   • Asthma Mother    • Heart attack Father    • Heart disease Father    • Heart failure Father    • Hypertension Father        Social History     Socioeconomic History   • Marital status: Single   Tobacco Use   • Smoking status: Never   • Smokeless tobacco: Never   Vaping Use   • Vaping Use: Never used   Substance and Sexual Activity   • Alcohol use: Not Currently     Comment: occ   • Drug use: Never   • Sexual activity: Yes     Partners: Female     Birth control/protection: Condom, Natural family planning/Rhythm         Procedures      Objective:       Physical Exam    /79 (BP Location: Right arm, Patient Position: Sitting)   Pulse 64   Ht 190.5 cm (75\")   Wt 134 kg (296 lb 8 oz)   BMI 37.06 kg/m²   The patient is alert, oriented and in no distress.    Vital signs as noted above.    Head and neck revealed no carotid bruits or jugular venous distension.  No thyromegaly or lymphadenopathy is present.    Lungs clear.  No wheezing.  Breath sounds are normal bilaterally.    Heart normal first and second heart sounds.  No murmur..  No pericardial rub is present.  No gallop is present.    Abdomen soft and nontender.  No organomegaly is present.    Extremities revealed good peripheral pulses without any pedal edema.    Skin warm and dry.    Musculoskeletal system is grossly normal.    CNS grossly normal.           Diagnosis Plan   1. Cardiomyopathy, dilated (HCC)        2. Acute combined systolic and diastolic congestive heart failure (HCC)        3. Tachycardia        4. Pulmonary hypertension (HCC)        5. Class 2 obesity with alveolar hypoventilation, serious comorbidity, and body mass index (BMI) of 38.0 to 38.9 in adult (HCC)        6. Nonrheumatic tricuspid valve regurgitation        7. Other hyperlipidemia        8. Pre-diabetes        LAB RESULTS (LAST 7 DAYS)    CBC    "     BMP        CMP         BNP        TROPONIN        CoAg        Creatinine Clearance  CrCl cannot be calculated (Patient's most recent lab result is older than the maximum 30 days allowed.).    ABG        Radiology  No radiology results for the last day        Assessment and Plan       Diagnoses and all orders for this visit:    1. Cardiomyopathy, dilated (HCC) (Primary)    2. Acute combined systolic and diastolic congestive heart failure (HCC)    3. Tachycardia    4. Pulmonary hypertension (HCC)    5. Class 2 obesity with alveolar hypoventilation, serious comorbidity, and body mass index (BMI) of 38.0 to 38.9 in adult (HCC)    6. Nonrheumatic tricuspid valve regurgitation    7. Other hyperlipidemia    8. Pre-diabetes         1. Cardiomyopathy, dilated (HCC) (Primary)  Echocardiogram from January 2023 shows EF of 27% with severe dilation of LV cavity.  No significant valvular abnormalities  Continue to uptitrate GDMT  Continue ACE inhibitor, beta-blocker and Aldactone.  He is unable to afford Jardiance.  His QRS is in left bundle branch block pattern with 155 ms.  I will refer him for BiV ICD placement.  Emphasized the importance of diet, medication compliance, provided education regarding diuretics  2. Acute combined systolic and diastolic congestive heart failure (HCC)  Previous EF 24% with moderate pulmonary hypertension   repeat echocardiogram in January 2023 shows EF of 27%  Currently on Lisinopril, Coreg and Aldactone  Continue furosemide 40 mg p.o. twice daily.  ECG with left bundle branch block morphology with QRS of 150 ms  He will benefit from BiV ICD placement.  3. Tachycardia  Tachycardia has resolved with diuretics and beta-blocker.  4. Pulmonary hypertension (HCC)  Needs sleep study.  Likely secondary to sleep apnea  5. Class 2 obesity with alveolar hypoventilation, serious comorbidity, and body mass index (BMI) of 38.0 to 38.9 in adult (HCC)  I have provided weight loss counseling for the patient  and discussed cardiac diet. I extensively discussed with the patient the cardiovascular risks associated with obesity and metabolic syndrome.  I gave ample time for questions and they were answered to their satisfaction.     I will see him in 6 months.  I will set him up for ICD placement with Dr. Hernandez.    Encouraged weight loss, diet and exercise.

## 2023-01-31 ENCOUNTER — APPOINTMENT (OUTPATIENT)
Dept: CARDIAC REHAB | Facility: HOSPITAL | Age: 48
End: 2023-01-31

## 2023-01-31 ENCOUNTER — OFFICE VISIT (OUTPATIENT)
Dept: CARDIOLOGY | Facility: CLINIC | Age: 48
End: 2023-01-31
Payer: COMMERCIAL

## 2023-01-31 VITALS
WEIGHT: 296.5 LBS | HEART RATE: 64 BPM | SYSTOLIC BLOOD PRESSURE: 123 MMHG | BODY MASS INDEX: 36.87 KG/M2 | HEIGHT: 75 IN | DIASTOLIC BLOOD PRESSURE: 79 MMHG

## 2023-01-31 DIAGNOSIS — I36.1 NONRHEUMATIC TRICUSPID VALVE REGURGITATION: ICD-10-CM

## 2023-01-31 DIAGNOSIS — I50.41 ACUTE COMBINED SYSTOLIC AND DIASTOLIC CONGESTIVE HEART FAILURE: ICD-10-CM

## 2023-01-31 DIAGNOSIS — R73.03 PRE-DIABETES: ICD-10-CM

## 2023-01-31 DIAGNOSIS — I42.0 CARDIOMYOPATHY, DILATED: Primary | ICD-10-CM

## 2023-01-31 DIAGNOSIS — I27.20 PULMONARY HYPERTENSION: ICD-10-CM

## 2023-01-31 DIAGNOSIS — R00.0 TACHYCARDIA: ICD-10-CM

## 2023-01-31 DIAGNOSIS — E78.49 OTHER HYPERLIPIDEMIA: ICD-10-CM

## 2023-01-31 DIAGNOSIS — E66.2 CLASS 2 OBESITY WITH ALVEOLAR HYPOVENTILATION, SERIOUS COMORBIDITY, AND BODY MASS INDEX (BMI) OF 38.0 TO 38.9 IN ADULT: ICD-10-CM

## 2023-01-31 PROCEDURE — 99214 OFFICE O/P EST MOD 30 MIN: CPT | Performed by: INTERNAL MEDICINE

## 2023-02-01 ENCOUNTER — PREP FOR SURGERY (OUTPATIENT)
Dept: OTHER | Facility: HOSPITAL | Age: 48
End: 2023-02-01
Payer: COMMERCIAL

## 2023-02-01 DIAGNOSIS — I42.0 CARDIOMYOPATHY, DILATED: Primary | ICD-10-CM

## 2023-02-02 ENCOUNTER — APPOINTMENT (OUTPATIENT)
Dept: CARDIAC REHAB | Facility: HOSPITAL | Age: 48
End: 2023-02-02

## 2023-02-07 ENCOUNTER — APPOINTMENT (OUTPATIENT)
Dept: CARDIAC REHAB | Facility: HOSPITAL | Age: 48
End: 2023-02-07

## 2023-02-09 ENCOUNTER — APPOINTMENT (OUTPATIENT)
Dept: CARDIAC REHAB | Facility: HOSPITAL | Age: 48
End: 2023-02-09

## 2023-02-14 ENCOUNTER — APPOINTMENT (OUTPATIENT)
Dept: CARDIAC REHAB | Facility: HOSPITAL | Age: 48
End: 2023-02-14

## 2023-02-16 ENCOUNTER — APPOINTMENT (OUTPATIENT)
Dept: CARDIAC REHAB | Facility: HOSPITAL | Age: 48
End: 2023-02-16

## 2023-02-16 ENCOUNTER — TELEPHONE (OUTPATIENT)
Dept: CARDIOLOGY | Facility: CLINIC | Age: 48
End: 2023-02-16
Payer: COMMERCIAL

## 2023-02-16 NOTE — TELEPHONE ENCOUNTER
Caller: Misael Carney    Relationship to patient: Self    Best call back number: [phone]    Patient is needing: PT WANTS TO VERIFY THAT WE HAVE RECEIVED THE UPDATED FMLA PAPERWORK FOR HIS PROCEDURE FROM [employer].  PLEASE CALL TO ADVISE.     THANK YOU.

## 2023-02-16 NOTE — TELEPHONE ENCOUNTER
Informed patient paperwork was received. Dr. Prashant newman not have clinic until Tuesday. Patient voiced understanding will stop by to make payment for paperwork next so we can fax it after paperwork has been signed.

## 2023-02-21 ENCOUNTER — APPOINTMENT (OUTPATIENT)
Dept: CARDIAC REHAB | Facility: HOSPITAL | Age: 48
End: 2023-02-21

## 2023-02-23 ENCOUNTER — APPOINTMENT (OUTPATIENT)
Dept: CARDIAC REHAB | Facility: HOSPITAL | Age: 48
End: 2023-02-23

## 2023-02-28 ENCOUNTER — LAB (OUTPATIENT)
Dept: LAB | Facility: HOSPITAL | Age: 48
End: 2023-02-28
Payer: COMMERCIAL

## 2023-02-28 DIAGNOSIS — I42.0 CARDIOMYOPATHY, DILATED: ICD-10-CM

## 2023-02-28 LAB
ALBUMIN SERPL-MCNC: 4.6 G/DL (ref 3.5–5.2)
ALBUMIN/GLOB SERPL: 1.3 G/DL
ALP SERPL-CCNC: 55 U/L (ref 39–117)
ALT SERPL W P-5'-P-CCNC: 17 U/L (ref 1–41)
ANION GAP SERPL CALCULATED.3IONS-SCNC: 8.7 MMOL/L (ref 5–15)
AST SERPL-CCNC: 22 U/L (ref 1–40)
BASOPHILS # BLD AUTO: 0.08 10*3/MM3 (ref 0–0.2)
BASOPHILS NFR BLD AUTO: 0.8 % (ref 0–1.5)
BILIRUB SERPL-MCNC: 1.4 MG/DL (ref 0–1.2)
BUN SERPL-MCNC: 20 MG/DL (ref 6–20)
BUN/CREAT SERPL: 15.6 (ref 7–25)
CALCIUM SPEC-SCNC: 9.4 MG/DL (ref 8.6–10.5)
CHLORIDE SERPL-SCNC: 100 MMOL/L (ref 98–107)
CO2 SERPL-SCNC: 30.3 MMOL/L (ref 22–29)
CREAT SERPL-MCNC: 1.28 MG/DL (ref 0.76–1.27)
DEPRECATED RDW RBC AUTO: 43.9 FL (ref 37–54)
EGFRCR SERPLBLD CKD-EPI 2021: 70.3 ML/MIN/1.73
EOSINOPHIL # BLD AUTO: 0.29 10*3/MM3 (ref 0–0.4)
EOSINOPHIL NFR BLD AUTO: 2.9 % (ref 0.3–6.2)
ERYTHROCYTE [DISTWIDTH] IN BLOOD BY AUTOMATED COUNT: 13 % (ref 12.3–15.4)
GLOBULIN UR ELPH-MCNC: 3.6 GM/DL
GLUCOSE SERPL-MCNC: 94 MG/DL (ref 65–99)
HCT VFR BLD AUTO: 36.7 % (ref 37.5–51)
HGB BLD-MCNC: 12.2 G/DL (ref 13–17.7)
IMM GRANULOCYTES # BLD AUTO: 0.03 10*3/MM3 (ref 0–0.05)
IMM GRANULOCYTES NFR BLD AUTO: 0.3 % (ref 0–0.5)
INR PPP: 1.13 (ref 0.93–1.1)
LYMPHOCYTES # BLD AUTO: 2.2 10*3/MM3 (ref 0.7–3.1)
LYMPHOCYTES NFR BLD AUTO: 22 % (ref 19.6–45.3)
MAGNESIUM SERPL-MCNC: 2.2 MG/DL (ref 1.6–2.6)
MCH RBC QN AUTO: 30.8 PG (ref 26.6–33)
MCHC RBC AUTO-ENTMCNC: 33.2 G/DL (ref 31.5–35.7)
MCV RBC AUTO: 92.7 FL (ref 79–97)
MONOCYTES # BLD AUTO: 1.04 10*3/MM3 (ref 0.1–0.9)
MONOCYTES NFR BLD AUTO: 10.4 % (ref 5–12)
NEUTROPHILS NFR BLD AUTO: 6.35 10*3/MM3 (ref 1.7–7)
NEUTROPHILS NFR BLD AUTO: 63.6 % (ref 42.7–76)
NRBC BLD AUTO-RTO: 0 /100 WBC (ref 0–0.2)
PLATELET # BLD AUTO: 572 10*3/MM3 (ref 140–450)
PMV BLD AUTO: 9.5 FL (ref 6–12)
POTASSIUM SERPL-SCNC: 4.2 MMOL/L (ref 3.5–5.2)
PROT SERPL-MCNC: 8.2 G/DL (ref 6–8.5)
PROTHROMBIN TIME: 11.6 SECONDS (ref 9.6–11.7)
RBC # BLD AUTO: 3.96 10*6/MM3 (ref 4.14–5.8)
SODIUM SERPL-SCNC: 139 MMOL/L (ref 136–145)
WBC NRBC COR # BLD: 9.99 10*3/MM3 (ref 3.4–10.8)

## 2023-02-28 PROCEDURE — 36415 COLL VENOUS BLD VENIPUNCTURE: CPT

## 2023-02-28 PROCEDURE — 80053 COMPREHEN METABOLIC PANEL: CPT

## 2023-02-28 PROCEDURE — 85025 COMPLETE CBC W/AUTO DIFF WBC: CPT

## 2023-02-28 PROCEDURE — 83735 ASSAY OF MAGNESIUM: CPT

## 2023-02-28 PROCEDURE — 85610 PROTHROMBIN TIME: CPT

## 2023-03-02 ENCOUNTER — APPOINTMENT (OUTPATIENT)
Dept: GENERAL RADIOLOGY | Facility: HOSPITAL | Age: 48
End: 2023-03-02
Payer: COMMERCIAL

## 2023-03-02 ENCOUNTER — HOSPITAL ENCOUNTER (OUTPATIENT)
Facility: HOSPITAL | Age: 48
Discharge: HOME | End: 2023-03-03
Attending: INTERNAL MEDICINE | Admitting: INTERNAL MEDICINE
Payer: COMMERCIAL

## 2023-03-02 ENCOUNTER — ANESTHESIA EVENT (OUTPATIENT)
Dept: CARDIOLOGY | Facility: HOSPITAL | Age: 48
End: 2023-03-02
Payer: COMMERCIAL

## 2023-03-02 ENCOUNTER — ANESTHESIA (OUTPATIENT)
Dept: CARDIOLOGY | Facility: HOSPITAL | Age: 48
End: 2023-03-02
Payer: COMMERCIAL

## 2023-03-02 VITALS
HEART RATE: 64 BPM | DIASTOLIC BLOOD PRESSURE: 55 MMHG | OXYGEN SATURATION: 96 % | RESPIRATION RATE: 8 BRPM | SYSTOLIC BLOOD PRESSURE: 91 MMHG

## 2023-03-02 DIAGNOSIS — I42.0 CARDIOMYOPATHY, DILATED: ICD-10-CM

## 2023-03-02 LAB
GLUCOSE BLDC GLUCOMTR-MCNC: 85 MG/DL (ref 70–105)
GLUCOSE BLDC GLUCOMTR-MCNC: 91 MG/DL (ref 70–105)
MRSA DNA SPEC QL NAA+PROBE: NORMAL

## 2023-03-02 PROCEDURE — C1894 INTRO/SHEATH, NON-LASER: HCPCS | Performed by: INTERNAL MEDICINE

## 2023-03-02 PROCEDURE — 33249 INSJ/RPLCMT DEFIB W/LEAD(S): CPT | Performed by: INTERNAL MEDICINE

## 2023-03-02 PROCEDURE — 82962 GLUCOSE BLOOD TEST: CPT

## 2023-03-02 PROCEDURE — C1892 INTRO/SHEATH,FIXED,PEEL-AWAY: HCPCS | Performed by: INTERNAL MEDICINE

## 2023-03-02 PROCEDURE — C1889 IMPLANT/INSERT DEVICE, NOC: HCPCS | Performed by: INTERNAL MEDICINE

## 2023-03-02 PROCEDURE — G0378 HOSPITAL OBSERVATION PER HR: HCPCS

## 2023-03-02 PROCEDURE — 25010000002 PHENYLEPHRINE 10 MG/ML SOLUTION: Performed by: NURSE ANESTHETIST, CERTIFIED REGISTERED

## 2023-03-02 PROCEDURE — 33225 L VENTRIC PACING LEAD ADD-ON: CPT | Performed by: INTERNAL MEDICINE

## 2023-03-02 PROCEDURE — 25010000002 CEFAZOLIN PER 500 MG: Performed by: INTERNAL MEDICINE

## 2023-03-02 PROCEDURE — 25510000001 IOPAMIDOL PER 1 ML: Performed by: INTERNAL MEDICINE

## 2023-03-02 PROCEDURE — C1769 GUIDE WIRE: HCPCS | Performed by: INTERNAL MEDICINE

## 2023-03-02 PROCEDURE — 25010000002 ONDANSETRON PER 1 MG: Performed by: NURSE ANESTHETIST, CERTIFIED REGISTERED

## 2023-03-02 PROCEDURE — 25010000002 PROPOFOL 10 MG/ML EMULSION: Performed by: NURSE ANESTHETIST, CERTIFIED REGISTERED

## 2023-03-02 PROCEDURE — 93005 ELECTROCARDIOGRAM TRACING: CPT | Performed by: INTERNAL MEDICINE

## 2023-03-02 PROCEDURE — 71045 X-RAY EXAM CHEST 1 VIEW: CPT

## 2023-03-02 PROCEDURE — C1900 LEAD, CORONARY VENOUS: HCPCS | Performed by: INTERNAL MEDICINE

## 2023-03-02 PROCEDURE — C1882 AICD, OTHER THAN SING/DUAL: HCPCS | Performed by: INTERNAL MEDICINE

## 2023-03-02 PROCEDURE — 87641 MR-STAPH DNA AMP PROBE: CPT | Performed by: INTERNAL MEDICINE

## 2023-03-02 PROCEDURE — 25010000002 FENTANYL CITRATE (PF) 100 MCG/2ML SOLUTION: Performed by: NURSE ANESTHETIST, CERTIFIED REGISTERED

## 2023-03-02 PROCEDURE — 25010000002 MIDAZOLAM PER 1 MG: Performed by: NURSE ANESTHETIST, CERTIFIED REGISTERED

## 2023-03-02 PROCEDURE — 93010 ELECTROCARDIOGRAM REPORT: CPT | Performed by: INTERNAL MEDICINE

## 2023-03-02 PROCEDURE — C1777 LEAD, AICD, ENDO SINGLE COIL: HCPCS | Performed by: INTERNAL MEDICINE

## 2023-03-02 PROCEDURE — C1898 LEAD, PMKR, OTHER THAN TRANS: HCPCS | Performed by: INTERNAL MEDICINE

## 2023-03-02 DEVICE — LD DEFIB DURATA SJ4 65CM 7122Q65: Type: IMPLANTABLE DEVICE | Site: HEART | Status: FUNCTIONAL

## 2023-03-02 DEVICE — LD QUARTET CRT VENT LNG SPACING 86CM: Type: IMPLANTABLE DEVICE | Site: HEART | Status: FUNCTIONAL

## 2023-03-02 DEVICE — LD PM TENDRIL STS 6F52CM 2088TC52: Type: IMPLANTABLE DEVICE | Site: HEART | Status: FUNCTIONAL

## 2023-03-02 DEVICE — ENV PM AIGISRX ANTIBAC RESORB 2.9X3.3IN LG: Type: IMPLANTABLE DEVICE | Site: HEART | Status: FUNCTIONAL

## 2023-03-02 DEVICE — IMPLANTABLE DEVICE: Type: IMPLANTABLE DEVICE | Site: HEART | Status: FUNCTIONAL

## 2023-03-02 RX ORDER — CEFAZOLIN SODIUM IN 0.9 % NACL 3 G/100 ML
3 INTRAVENOUS SOLUTION, PIGGYBACK (ML) INTRAVENOUS ONCE
Status: COMPLETED | OUTPATIENT
Start: 2023-03-02 | End: 2023-03-02

## 2023-03-02 RX ORDER — FUROSEMIDE 40 MG/1
40 TABLET ORAL 2 TIMES DAILY
Status: DISCONTINUED | OUTPATIENT
Start: 2023-03-02 | End: 2023-03-03 | Stop reason: HOSPADM

## 2023-03-02 RX ORDER — HYDROCODONE BITARTRATE AND ACETAMINOPHEN 7.5; 325 MG/1; MG/1
1 TABLET ORAL EVERY 4 HOURS PRN
Status: DISCONTINUED | OUTPATIENT
Start: 2023-03-02 | End: 2023-03-03 | Stop reason: HOSPADM

## 2023-03-02 RX ORDER — CEFAZOLIN SODIUM IN 0.9 % NACL 3 G/100 ML
3 INTRAVENOUS SOLUTION, PIGGYBACK (ML) INTRAVENOUS EVERY 8 HOURS
Status: DISCONTINUED | OUTPATIENT
Start: 2023-03-02 | End: 2023-03-02

## 2023-03-02 RX ORDER — CARVEDILOL 6.25 MG/1
12.5 TABLET ORAL 2 TIMES DAILY WITH MEALS
Status: DISCONTINUED | OUTPATIENT
Start: 2023-03-02 | End: 2023-03-03 | Stop reason: HOSPADM

## 2023-03-02 RX ORDER — FENTANYL CITRATE 50 UG/ML
INJECTION, SOLUTION INTRAMUSCULAR; INTRAVENOUS AS NEEDED
Status: DISCONTINUED | OUTPATIENT
Start: 2023-03-02 | End: 2023-03-02 | Stop reason: SURG

## 2023-03-02 RX ORDER — LISINOPRIL 5 MG/1
10 TABLET ORAL
Status: DISCONTINUED | OUTPATIENT
Start: 2023-03-02 | End: 2023-03-03 | Stop reason: HOSPADM

## 2023-03-02 RX ORDER — MIDAZOLAM HYDROCHLORIDE 1 MG/ML
INJECTION INTRAMUSCULAR; INTRAVENOUS AS NEEDED
Status: DISCONTINUED | OUTPATIENT
Start: 2023-03-02 | End: 2023-03-02 | Stop reason: SURG

## 2023-03-02 RX ORDER — ATORVASTATIN CALCIUM 40 MG/1
40 TABLET, FILM COATED ORAL NIGHTLY
Status: DISCONTINUED | OUTPATIENT
Start: 2023-03-02 | End: 2023-03-03 | Stop reason: HOSPADM

## 2023-03-02 RX ORDER — SODIUM CHLORIDE 9 MG/ML
INJECTION, SOLUTION INTRAVENOUS CONTINUOUS PRN
Status: DISCONTINUED | OUTPATIENT
Start: 2023-03-02 | End: 2023-03-02 | Stop reason: SURG

## 2023-03-02 RX ORDER — PHENYLEPHRINE HYDROCHLORIDE 10 MG/ML
INJECTION INTRAVENOUS AS NEEDED
Status: DISCONTINUED | OUTPATIENT
Start: 2023-03-02 | End: 2023-03-02 | Stop reason: SURG

## 2023-03-02 RX ORDER — ONDANSETRON 2 MG/ML
INJECTION INTRAMUSCULAR; INTRAVENOUS AS NEEDED
Status: DISCONTINUED | OUTPATIENT
Start: 2023-03-02 | End: 2023-03-02 | Stop reason: SURG

## 2023-03-02 RX ORDER — LIDOCAINE HYDROCHLORIDE AND EPINEPHRINE BITARTRATE 20; .01 MG/ML; MG/ML
INJECTION, SOLUTION SUBCUTANEOUS
Status: DISCONTINUED | OUTPATIENT
Start: 2023-03-02 | End: 2023-03-02 | Stop reason: HOSPADM

## 2023-03-02 RX ORDER — SPIRONOLACTONE 25 MG/1
25 TABLET ORAL DAILY
Status: DISCONTINUED | OUTPATIENT
Start: 2023-03-02 | End: 2023-03-03 | Stop reason: HOSPADM

## 2023-03-02 RX ORDER — EPHEDRINE SULFATE 5 MG/ML
INJECTION INTRAVENOUS AS NEEDED
Status: DISCONTINUED | OUTPATIENT
Start: 2023-03-02 | End: 2023-03-02 | Stop reason: SURG

## 2023-03-02 RX ADMIN — ONDANSETRON 4 MG: 2 INJECTION INTRAMUSCULAR; INTRAVENOUS at 15:06

## 2023-03-02 RX ADMIN — PHENYLEPHRINE HYDROCHLORIDE 100 MCG: 10 INJECTION INTRAVENOUS at 14:53

## 2023-03-02 RX ADMIN — FENTANYL CITRATE 100 MCG: 50 INJECTION, SOLUTION INTRAMUSCULAR; INTRAVENOUS at 13:26

## 2023-03-02 RX ADMIN — PHENYLEPHRINE HYDROCHLORIDE 100 MCG: 10 INJECTION INTRAVENOUS at 14:45

## 2023-03-02 RX ADMIN — CEFAZOLIN 2 G: 2 INJECTION, POWDER, FOR SOLUTION INTRAMUSCULAR; INTRAVENOUS at 22:46

## 2023-03-02 RX ADMIN — MIDAZOLAM 2 MG: 1 INJECTION INTRAMUSCULAR; INTRAVENOUS at 13:26

## 2023-03-02 RX ADMIN — CEFAZOLIN 3 G: 10 INJECTION, POWDER, FOR SOLUTION INTRAVENOUS at 13:21

## 2023-03-02 RX ADMIN — PHENYLEPHRINE HYDROCHLORIDE 100 MCG: 10 INJECTION INTRAVENOUS at 15:03

## 2023-03-02 RX ADMIN — PHENYLEPHRINE HYDROCHLORIDE 100 MCG: 10 INJECTION INTRAVENOUS at 14:21

## 2023-03-02 RX ADMIN — EPHEDRINE SULFATE 10 MG: 5 INJECTION INTRAVENOUS at 13:47

## 2023-03-02 RX ADMIN — PHENYLEPHRINE HYDROCHLORIDE 100 MCG: 10 INJECTION INTRAVENOUS at 14:30

## 2023-03-02 RX ADMIN — EPHEDRINE SULFATE 10 MG: 5 INJECTION INTRAVENOUS at 14:06

## 2023-03-02 RX ADMIN — EPHEDRINE SULFATE 10 MG: 5 INJECTION INTRAVENOUS at 13:39

## 2023-03-02 RX ADMIN — HYDROCODONE BITARTRATE AND ACETAMINOPHEN 1 TABLET: 7.5; 325 TABLET ORAL at 17:04

## 2023-03-02 RX ADMIN — PROPOFOL 150 MCG/KG/MIN: 10 INJECTION, EMULSION INTRAVENOUS at 13:29

## 2023-03-02 RX ADMIN — SODIUM CHLORIDE: 0.9 INJECTION, SOLUTION INTRAVENOUS at 13:23

## 2023-03-02 RX ADMIN — EPHEDRINE SULFATE 10 MG: 5 INJECTION INTRAVENOUS at 13:36

## 2023-03-02 RX ADMIN — FUROSEMIDE 40 MG: 40 TABLET ORAL at 22:06

## 2023-03-02 RX ADMIN — HYDROCODONE BITARTRATE AND ACETAMINOPHEN 1 TABLET: 7.5; 325 TABLET ORAL at 22:07

## 2023-03-02 RX ADMIN — PHENYLEPHRINE HYDROCHLORIDE 100 MCG: 10 INJECTION INTRAVENOUS at 14:08

## 2023-03-02 RX ADMIN — ATORVASTATIN CALCIUM 40 MG: 40 TABLET, FILM COATED ORAL at 22:07

## 2023-03-02 RX ADMIN — EPHEDRINE SULFATE 10 MG: 5 INJECTION INTRAVENOUS at 13:41

## 2023-03-02 NOTE — ANESTHESIA POSTPROCEDURE EVALUATION
Patient: Misael Carney    Procedure Summary     Date: 03/02/23 Room / Location: [facility] CATH LAB 3 / [facility] C[facility] INVASIVE LOCATION    Anesthesia Start: 1323 Anesthesia Stop: 1544    Procedure: BivICD Multani aware Diagnosis:       Cardiomyopathy, dilated (HCC)      (NYHA class II CHF despite revascularization and optimal medical therapy and EF < 35%, LBBB)    Providers: Marci Cerda MD Provider: Gloria Fernandez CRNA    Anesthesia Type: general, MAC ASA Status: 4          Anesthesia Type: general, MAC    Vitals  Vitals Value Taken Time   /79 03/02/23 1731   Temp     Pulse 61 03/02/23 1757   Resp 15 03/02/23 1545   SpO2 99 % 03/02/23 1757   Vitals shown include unvalidated device data.        Post Anesthesia Care and Evaluation    Patient location during evaluation: PACU  Patient participation: complete - patient participated  Level of consciousness: awake and alert  Pain management: satisfactory to patient    Airway patency: patent  Anesthetic complications: No anesthetic complications  PONV Status: none  Cardiovascular status: acceptable  Respiratory status: acceptable  Hydration status: acceptable

## 2023-03-02 NOTE — INTERVAL H&P NOTE
H&P reviewed. The patient was examined and there are no changes to the H&P.      Mr. Carney is a 47-year-old male patient who has nonischemic cardiomyopathy, left bundle branch block, class III CHF with persistence of symptoms despite optimal medical therapy, persistently low ejection fraction.  He is here today for implantation of a biventricular ICD for cardiac resynchronization therapy in an attempt to improve his heart function and CHF symptoms and also to provide primary prevention of sudden cardiac death.  His life expectancy within ICD is greater than 1 year.  I went over the risk and benefits with the patient of having an ICD, using the [facility] decision aid, to help the patient make an informed decision to have the procedure done. The patient voiced understanding and agreed to proceed with defibrillator implantation.

## 2023-03-02 NOTE — ANESTHESIA PREPROCEDURE EVALUATION
Anesthesia Evaluation     Patient summary reviewed and Nursing notes reviewed   no history of anesthetic complications:  NPO Solid Status: > 8 hours  NPO Liquid Status: > 8 hours           Airway   Dental      Pulmonary    Cardiovascular     ECG reviewed  PT is on anticoagulation therapy    (+) hypertension, CAD, dysrhythmias Tachycardia, CHF Diastolic >=55% and Systolic <55%, hyperlipidemia,       Neuro/Psych  GI/Hepatic/Renal/Endo    (+) obesity,  PUD,  diabetes mellitus,     Musculoskeletal     Abdominal    Substance History      OB/GYN          Other        ROS/Med Hx Other: Additional History:  Congenital heart dz, LBBB, dilated cardiomyopathy, pulmonary hypertension, gout    Echo:  Echocardiogram Findings    Left Ventricle Left ventricular systolic function is moderately decreased. Calculated left ventricular EF = 27% Left ventricular ejection fraction appears to be 26 - 30%.     The left ventricular cavity is severely dilated. Left ventricular diastolic dysfunction is noted.  Right Ventricle Normal right ventricular cavity size, wall thickness, systolic function and septal motion noted.  Left Atrium The left atrial cavity is dilated.  Right Atrium Normal right atrial cavity size noted.  Aortic Valve The aortic valve is grossly normal in structure. Trace aortic valve regurgitation is present. No aortic valve stenosis is present.  Mitral Valve The mitral valve is grossly normal in structure. Trace mitral valve regurgitation is present. No significant mitral valve stenosis is present.  Tricuspid Valve Trace tricuspid valve regurgitation is present. Estimated right ventricular systolic pressure from tricuspid regurgitation is normal (<35 mmHg). No evidence of pulmonary hypertension is present. No tricuspid valve stenosis is present.  Pulmonic Valve The pulmonic valve is grossly normal in structure. There is trace pulmonic valve regurgitation present. There is no pulmonic valve stenosis present.  Greater  Vessels No dilation of the aortic root is present. No dilation of the sinuses of Valsalva is present.  Pericardium There is no evidence of pericardial effusion. .        Cath:  CORONARY ANGIOGRAM FINDINGS:  Dominance: Right  #Left main: Left main is  A large vessel Which gives rise to the LAD left circumflex artery.  There is no angiographically significant stenosis  #Left Anterior Descending Artery: LAD is a large caliber vessel which gives rise to several septal perforators and several diagonal branches. There is no angiographically significant stenosis  #Left Circumflex: The LCx is a large, non-dominant vessel which gives rise to OM branches. It is angiographically free of disease  #Right Coronary Artery: The RCA is a large, dominant vessel which gives rise to several small caliber branches and the PDA and PLV.  Right coronary artery is angiographically free from any significant disease.        [facility] HEMODYNAMICS:  LVp 109/24, 32  AOp 104/87, 96  No significant gradient on pullback across aortic valve.     [facility] HEMODYNAMICS:  RA 27/25, 24  RV 52/14, 27  PA 50/30, 42  PCW 33/34, 30  AO Sat 98%  PA Sat 73 %     Leif CO 7.68     Leif CI 2.9     ESTIMATED BLOOD LOSS:  10 ml     COMPLICATIONS:  None     PROCEDURE DATA:  Contrast Used:20 cc  Sedation Time: 27 minutes 55 seconds     IMPRESSIONS  No angiographic evidence of obstructive CAD  Elevated left heart filling pressures with LVEDP of 35 mmHg  Non ischemic cardiomyopathy     RECOMMENDATIONS  -- GDMT for cardiomyopathy  -- Increase IV diuretics  -- Risk factor and lifestyle modifications  -- Repeat echocardiogram after 3 months of GDMT      PSH:  ANKLE SURGERY CARDIAC CATHETERIZATION                 Anesthesia Plan    ASA 4     general and MAC     (Patient identified; pre-operative vital signs, all relevant labs/studies, complete medical/surgical/anesthetic history, full medication list, full allergy list, and NPO status obtained/reviewed; physical assessment  performed; anesthetic options, side effects, potential complications, risks, and benefits discussed; questions answered; written anesthesia consent obtained; patient cleared for procedure; anesthesia machine and equipment checked and functioning)  intravenous induction     Anesthetic plan, risks, benefits, and alternatives have been provided, discussed and informed consent has been obtained with: patient.    Plan discussed with CRNA and CAA.        CODE STATUS:

## 2023-03-03 VITALS
TEMPERATURE: 99 F | HEIGHT: 74 IN | BODY MASS INDEX: 37.72 KG/M2 | RESPIRATION RATE: 15 BRPM | SYSTOLIC BLOOD PRESSURE: 111 MMHG | OXYGEN SATURATION: 95 % | HEART RATE: 69 BPM | DIASTOLIC BLOOD PRESSURE: 72 MMHG | WEIGHT: 293.87 LBS

## 2023-03-03 PROCEDURE — G0378 HOSPITAL OBSERVATION PER HR: HCPCS

## 2023-03-03 PROCEDURE — 93005 ELECTROCARDIOGRAM TRACING: CPT | Performed by: INTERNAL MEDICINE

## 2023-03-03 PROCEDURE — 25010000002 CEFAZOLIN PER 500 MG: Performed by: INTERNAL MEDICINE

## 2023-03-03 PROCEDURE — 93010 ELECTROCARDIOGRAM REPORT: CPT | Performed by: INTERNAL MEDICINE

## 2023-03-03 PROCEDURE — 99024 POSTOP FOLLOW-UP VISIT: CPT | Performed by: NURSE PRACTITIONER

## 2023-03-03 PROCEDURE — 25010000002 ONDANSETRON PER 1 MG: Performed by: NURSE PRACTITIONER

## 2023-03-03 RX ORDER — CEPHALEXIN 500 MG/1
500 CAPSULE ORAL 2 TIMES DAILY
Qty: 10 CAPSULE | Refills: 0 | Status: SHIPPED | OUTPATIENT
Start: 2023-03-03 | End: 2023-03-08

## 2023-03-03 RX ORDER — HYDROCODONE BITARTRATE AND ACETAMINOPHEN 7.5; 325 MG/1; MG/1
1 TABLET ORAL EVERY 4 HOURS PRN
Qty: 20 TABLET | Refills: 0 | Status: CANCELLED | OUTPATIENT
Start: 2023-03-03 | End: 2023-03-08

## 2023-03-03 RX ORDER — ONDANSETRON 2 MG/ML
4 INJECTION INTRAMUSCULAR; INTRAVENOUS EVERY 6 HOURS PRN
Status: DISCONTINUED | OUTPATIENT
Start: 2023-03-03 | End: 2023-03-03 | Stop reason: HOSPADM

## 2023-03-03 RX ADMIN — SPIRONOLACTONE 25 MG: 25 TABLET ORAL at 09:08

## 2023-03-03 RX ADMIN — HYDROCODONE BITARTRATE AND ACETAMINOPHEN 1 TABLET: 7.5; 325 TABLET ORAL at 02:44

## 2023-03-03 RX ADMIN — CARVEDILOL 12.5 MG: 6.25 TABLET, FILM COATED ORAL at 09:07

## 2023-03-03 RX ADMIN — LISINOPRIL 10 MG: 5 TABLET ORAL at 09:08

## 2023-03-03 RX ADMIN — ONDANSETRON 4 MG: 2 INJECTION INTRAMUSCULAR; INTRAVENOUS at 09:07

## 2023-03-03 RX ADMIN — FUROSEMIDE 40 MG: 40 TABLET ORAL at 09:08

## 2023-03-03 RX ADMIN — CEFAZOLIN 2 G: 2 INJECTION, POWDER, FOR SOLUTION INTRAMUSCULAR; INTRAVENOUS at 09:08

## 2023-03-03 NOTE — PLAN OF CARE
V/S stable, all needs met.   Will continue to provide patient's needs.     Problem: Adult Inpatient Plan of Care  Goal: Absence of Hospital-Acquired Illness or Injury  Intervention: Prevent Skin Injury  Recent Flowsheet Documentation  Taken 3/3/2023 0410 by Linda Stanford RN  Body Position: position changed independently  Skin Protection:   adhesive use limited   tubing/devices free from skin contact  Intervention: Prevent and Manage VTE (Venous Thromboembolism) Risk  Recent Flowsheet Documentation  Taken 3/3/2023 0410 by Linda Stanford RN  Activity Management: up ad gt  Goal: Optimal Comfort and Wellbeing  Intervention: Provide Person-Centered Care  Recent Flowsheet Documentation  Taken 3/3/2023 0410 by Linda Stanford RN  Trust Relationship/Rapport:   care explained   choices provided   reassurance provided   questions answered   thoughts/feelings acknowledged     Problem: Adjustment to Device (Cardiac Rhythm Management Device)  Goal: Optimal Adjustment to Device  Intervention: Optimize Psychosocial Response to Device Implantation  Recent Flowsheet Documentation  Taken 3/3/2023 0410 by Linda Stanford RN  Supportive Measures:   active listening utilized   decision-making supported   goal-setting facilitated   self-reflection promoted   positive reinforcement provided  Family/Support System Care:   caregiver stress acknowledged   support provided     Problem: Bleeding (Cardiac Rhythm Management Device)  Goal: Absence of Bleeding  Intervention: Monitor and Manage Bleeding  Recent Flowsheet Documentation  Taken 3/3/2023 0410 by Linda Stanford RN  Bleeding Precautions: blood pressure closely monitored     Problem: Infection (Cardiac Rhythm Management Device)  Goal: Absence of Infection Signs and Symptoms  Intervention: Prevent or Manage Infection  Recent Flowsheet Documentation  Taken 3/3/2023 0410 by Linda Stanford RN  Infection Management: aseptic technique maintained     Problem: Respiratory  Compromise (Cardiac Rhythm Management Device)  Goal: Effective Oxygenation and Ventilation  Intervention: Optimize Oxygenation and Ventilation  Recent Flowsheet Documentation  Taken 3/3/2023 0410 by Linda Stanford RN  Cough And Deep Breathing: done independently per patient   Goal Outcome Evaluation:

## 2023-03-03 NOTE — DISCHARGE SUMMARY
[facility] [place] [facility]    Date of Admission: 3/2/2023    Date of Discharge:  3/3/2023    Length of stay:  LOS: 0 days     Admission Diagnosis: Non-ischemic cardiomyopathy, NYHA Class II CHF, LBBB, EF <35%    Discharge Diagnosis: same, status post BiV ICD implantation    Presenting Problem/History of Present Illness  Active Hospital Problems    Diagnosis  POA   • **Cardiomyopathy, dilated (HCC) [I42.0]  Unknown      Resolved Hospital Problems   No resolved problems to display.          Hospital Course  Misael Carney is a 47 y.o. male presented for an elective BiV ICD implantation due to NICM with EF <35% and NYHA Class II HF unresponsive to GDMT on 3/2/2023. No procedural complications, patient was seen and examined this morning, no bleeding or hematoma at surgical incision site.  Device interrogation showed appropriate function and chest x-ray showed good lead positioning.  Patient will be discharged home today in a stable condition with Keflex for 5 days.  We will do a 2-week wound check appointment and then follow up with Dr. Cerda in 1 month.    The discharge process took about 35 minutes during which we discussed about ICD monitoring, wound care, medications as well as arm restrictions.  The patient voiced understanding and all his  questions were answered to his satisfaction.        Past Medical History:   Diagnosis Date   • Abnormal ECG 9/19/2022   • CHF (congestive heart failure) (HCC)    • Congenital heart disease 9/19/2022   • Coronary artery disease    • Diabetes mellitus (HCC)    • Peptic ulcer disease      Past Surgical History:   Procedure Laterality Date   • ANKLE SURGERY Left 2016   • CARDIAC CATHETERIZATION Bilateral 09/21/2022    Procedure: Right and Left Heart Cath;  Surgeon: Ridge Lindsay MD;  Location: [facility] INVASIVE LOCATION;  Service: Cardiovascular;  Laterality: Bilateral;     Social History     Socioeconomic History   • Marital status: Single   Tobacco Use   • Smoking status: Never   •  Smokeless tobacco: Never   Vaping Use   • Vaping Use: Never used   Substance and Sexual Activity   • Alcohol use: Not Currently     Comment: occ   • Drug use: Never   • Sexual activity: Yes     Partners: Female     Birth control/protection: Condom, Natural family planning/Rhythm       Procedures Performed       Consults:   Consulting Physician(s)             None            Pertinent Test Results:     Lab Results (last 72 hours)     Procedure Component Value Units Date/Time    POC Glucose Once [[id number]]  (Normal) Collected: 03/02/23 2053    Specimen: Blood Updated: 03/02/23 2056     Glucose 85 mg/dL      Comment: Serial Number: [id number]:  [id number]       MRSA Screen, PCR (Inpatient) - Swab, Nares [[id number]]  (Normal) Collected: 03/02/23 1055    Specimen: Swab from Nares Updated: 03/02/23 1233     MRSA PCR No MRSA Detected    Narrative:      The negative predictive value of this diagnostic test is high and should only be used to consider de-escalating anti-MRSA therapy. A positive result may indicate colonization with MRSA and must be correlated clinically.    POC Glucose Once [[id number]]  (Normal) Collected: 03/02/23 1019    Specimen: Blood Updated: 03/02/23 1021     Glucose 91 mg/dL      Comment: Serial Number: [id number]:  [id number]             Results for orders placed during the hospital encounter of 01/18/23    Adult Transthoracic Echo Complete W/ Cont if Necessary Per Protocol    Interpretation Summary  •  Left ventricular systolic function is moderately decreased. Calculated left ventricular EF = 27% Left ventricular ejection fraction appears to be 26 - 30%.  •  The left ventricular cavity is severely dilated.  •  Left ventricular diastolic dysfunction is noted.  •  Estimated right ventricular systolic pressure from tricuspid regurgitation is normal (<35 mmHg).  •  No significant valvular abnormalities.      Imaging Results (Last 72 Hours)     Procedure Component Value Units Date/Time    XR  "Chest 1 View [[id number]] Collected: 03/02/23 1653     Updated: 03/02/23 1656    Narrative:      XR CHEST 1 VW    Date of Exam: 3/2/2023 4:38 PM EST    Indication: Post ICD / Pacer Implant.    Comparison: 9/19/2022    Findings:  The heart size is enlarged. There is a biventricular pacing device in place with the leads in appropriate position. There is no pneumothorax. The lungs are expanded chest wall and they are clear.      Impression:      Impression:    1. Status post biventricular pacing device placement.  2. Stable cardiomegaly.  3. No acute process in the chest. No pneumothorax identified.    Electronically Signed: Johnnie Leon    3/2/2023 4:54 PM EST    Workstation ID: [id number]            Condition on Discharge:  stable    Vital Signs  /72 (BP Location: Right arm, Patient Position: Lying)   Pulse 69   Temp 99 °F (37.2 °C) (Oral)   Resp 15   Ht 188.6 cm (74.25\")   Wt 133 kg (293 lb 14 oz)   SpO2 95%   BMI 37.48 kg/m²     Physical Exam  Vitals reviewed.   Constitutional:       Appearance: Normal appearance. He is obese.   HENT:      Head: Normocephalic and atraumatic.   Eyes:      Extraocular Movements: Extraocular movements intact.      Pupils: Pupils are equal, round, and reactive to light.   Cardiovascular:      Rate and Rhythm: Normal rate and regular rhythm.      Pulses: Normal pulses.      Heart sounds: Normal heart sounds.      Comments: Left chest incision site clean and dry with steri-strips attached. No bleeding or hematoma noted.  Pulmonary:      Effort: Pulmonary effort is normal.      Breath sounds: Normal breath sounds.   Abdominal:      General: Bowel sounds are normal.   Musculoskeletal:         General: Normal range of motion.   Skin:     General: Skin is warm and dry.   Neurological:      General: No focal deficit present.      Mental Status: He is alert and oriented to person, place, and time. Mental status is at baseline.         Discharge Disposition  Home or Self " Care    Discharge Medications     Discharge Medications      New Medications      Instructions Start Date   cephalexin 500 MG capsule  Commonly known as: Keflex   500 mg, Oral, 2 Times Daily         Continue These Medications      Instructions Start Date   Aspirin Low Dose 81 MG EC tablet  Generic drug: aspirin   81 mg, Oral, Daily      atorvastatin 40 MG tablet  Commonly known as: LIPITOR   40 mg, Oral, Nightly      carvedilol 12.5 MG tablet  Commonly known as: COREG   12.5 mg, Oral, 2 Times Daily With Meals      fluticasone 50 MCG/ACT nasal spray  Commonly known as: Flonase   instill 2 sprays into the nostril(s) daily      furosemide 40 MG tablet  Commonly known as: LASIX   40 mg, Oral, 2 Times Daily      lisinopril 10 MG tablet  Commonly known as: PRINIVIL,ZESTRIL   10 mg, Oral, Every 24 Hours Scheduled      spironolactone 25 MG tablet  Commonly known as: ALDACTONE   25 mg, Oral, Daily             Discharge Diet:  cardiac    Activity at Discharge:  as tolerated, physical limitations and restrictions discussed with patient    Follow-up Appointments  Future Appointments   Date Time Provider Department Center   3/16/2023  3:00 PM [facility] DEVICE CHECK [facility] NA   3/21/2023 12:45 PM CARDIAC EXERCISE [facility] None   3/23/2023 12:45 PM CARDIAC EXERCISE [facility] None   3/28/2023 12:45 PM CARDIAC EXERCISE [facility] None   3/30/2023 12:45 PM CARDIAC EXERCISE [facility] None   4/4/2023 12:45 PM CARDIAC EXERCISE [facility] None   4/4/2023  1:30 PM Marci Cerda MD [facility] NA   4/6/2023 12:45 PM CARDIAC EXERCISE [facility] None   4/11/2023 12:45 PM CARDIAC EXERCISE [facility] None   4/13/2023 12:45 PM CARDIAC EXERCISE [facility] None   4/18/2023 12:45 PM CARDIAC EXERCISE [facility] None   4/20/2023 12:45 PM CARDIAC EXERCISE [facility] None   4/25/2023 12:45 PM CARDIAC EXERCISE [facility] None    4/27/2023 12:45 PM CARDIAC EXERCISE [facility] None   5/2/2023 12:45 PM CARDIAC EXERCISE [facility] None   5/4/2023 12:45 PM CARDIAC EXERCISE [facility] None   5/9/2023 12:45 PM CARDIAC EXERCISE [facility] None   8/1/2023  1:45 PM Ridge Lindsay MD [facility] NA       Risk for Readmission (LACE) Score: 7 (3/3/2023  6:00 AM)      RENY Feldman  03/03/23  10:17 EST   Electronically signed by RENY Feldman, 03/03/23, 10:22 AM EST.

## 2023-03-04 ENCOUNTER — READMISSION MANAGEMENT (OUTPATIENT)
Dept: CALL CENTER | Facility: HOSPITAL | Age: 48
End: 2023-03-04
Payer: COMMERCIAL

## 2023-03-04 NOTE — OUTREACH NOTE
Prep Survey    Flowsheet Row Responses   [facility] facility patient discharged from? [facility]   Is LACE score < 7 ? No   Eligibility Readm Mgmt   Discharge diagnosis Cardiomyopathy, dilated   Does the patient have one of the following disease processes/diagnoses(primary or secondary)? Other   Does the patient have Home health ordered? No   Is there a DME ordered? No   Prep survey completed? Yes          Destiny VEGA - Registered Nurse

## 2023-03-06 NOTE — CASE MANAGEMENT/SOCIAL WORK
Case Management Discharge Note      Final Note: Routine Home         Selected Continued Care - Discharged on 3/3/2023 Admission date: 3/2/2023 - Discharge disposition: Home or Self Care         Transportation Services  Private: Car    Final Discharge Disposition Code: 01 - home or self-care

## 2023-03-08 ENCOUNTER — READMISSION MANAGEMENT (OUTPATIENT)
Dept: CALL CENTER | Facility: HOSPITAL | Age: 48
End: 2023-03-08
Payer: COMMERCIAL

## 2023-03-08 NOTE — OUTREACH NOTE
Medical Week 1 Survey    Flowsheet Row Responses   [facility] facility patient discharged from? [facility]   Does the patient have one of the following disease processes/diagnoses(primary or secondary)? Other   Week 1 attempt successful? No   Unsuccessful attempts Attempt 1          Carole Colon Registered Nurse

## 2023-03-10 LAB
QT INTERVAL: 528 MS
QT INTERVAL: 549 MS

## 2023-03-14 ENCOUNTER — READMISSION MANAGEMENT (OUTPATIENT)
Dept: CALL CENTER | Facility: HOSPITAL | Age: 48
End: 2023-03-14
Payer: COMMERCIAL

## 2023-03-14 NOTE — OUTREACH NOTE
Medical Week 2 Survey    Flowsheet Row Responses   [facility] facility patient discharged from? [facility]   Does the patient have one of the following disease processes/diagnoses(primary or secondary)? Other   Week 2 attempt successful? No   Unsuccessful attempts Attempt 1          Funmi Colon Registered Nurse

## 2023-03-16 ENCOUNTER — CLINICAL SUPPORT NO REQUIREMENTS (OUTPATIENT)
Dept: CARDIOLOGY | Facility: CLINIC | Age: 48
End: 2023-03-16
Payer: COMMERCIAL

## 2023-03-16 DIAGNOSIS — I42.0 CARDIOMYOPATHY, DILATED: ICD-10-CM

## 2023-03-16 DIAGNOSIS — Z95.810 PRESENCE OF AUTOMATIC CARDIOVERTER/DEFIBRILLATOR (AICD): ICD-10-CM

## 2023-03-16 DIAGNOSIS — I50.41 ACUTE COMBINED SYSTOLIC AND DIASTOLIC CONGESTIVE HEART FAILURE: Primary | ICD-10-CM

## 2023-03-16 PROCEDURE — 93284 PRGRMG EVAL IMPLANTABLE DFB: CPT | Performed by: INTERNAL MEDICINE

## 2023-03-21 ENCOUNTER — OFFICE VISIT (OUTPATIENT)
Dept: CARDIAC REHAB | Facility: HOSPITAL | Age: 48
End: 2023-03-21

## 2023-03-21 DIAGNOSIS — I42.0 CARDIOMYOPATHY, DILATED: Primary | ICD-10-CM

## 2023-03-22 ENCOUNTER — READMISSION MANAGEMENT (OUTPATIENT)
Dept: CALL CENTER | Facility: HOSPITAL | Age: 48
End: 2023-03-22
Payer: COMMERCIAL

## 2023-03-22 NOTE — OUTREACH NOTE
Medical Week 3 Survey    Flowsheet Row Responses   [facility] patient discharged from? [facility]   Does the patient have one of the following disease processes/diagnoses(primary or secondary)? Other   Week 3 attempt successful? No   Unsuccessful attempts Attempt 1   Discharge diagnosis Cardiomyopathy, dilated          Jen MENDOZA - Registered Nurse

## 2023-03-23 ENCOUNTER — APPOINTMENT (OUTPATIENT)
Dept: CARDIAC REHAB | Facility: HOSPITAL | Age: 48
End: 2023-03-23

## 2023-03-28 ENCOUNTER — APPOINTMENT (OUTPATIENT)
Dept: CARDIAC REHAB | Facility: HOSPITAL | Age: 48
End: 2023-03-28

## 2023-03-30 ENCOUNTER — OFFICE VISIT (OUTPATIENT)
Dept: CARDIAC REHAB | Facility: HOSPITAL | Age: 48
End: 2023-03-30

## 2023-03-30 DIAGNOSIS — I42.0 CARDIOMYOPATHY, DILATED: Primary | ICD-10-CM

## 2023-04-04 ENCOUNTER — OFFICE VISIT (OUTPATIENT)
Dept: CARDIOLOGY | Facility: CLINIC | Age: 48
End: 2023-04-04
Payer: COMMERCIAL

## 2023-04-04 ENCOUNTER — APPOINTMENT (OUTPATIENT)
Dept: CARDIAC REHAB | Facility: HOSPITAL | Age: 48
End: 2023-04-04

## 2023-04-04 VITALS
BODY MASS INDEX: 38.76 KG/M2 | HEART RATE: 62 BPM | HEIGHT: 74 IN | OXYGEN SATURATION: 96 % | DIASTOLIC BLOOD PRESSURE: 69 MMHG | WEIGHT: 302 LBS | SYSTOLIC BLOOD PRESSURE: 106 MMHG

## 2023-04-04 DIAGNOSIS — Z95.810 PRESENCE OF AUTOMATIC CARDIOVERTER/DEFIBRILLATOR (AICD): Primary | ICD-10-CM

## 2023-04-04 DIAGNOSIS — I42.0 CARDIOMYOPATHY, DILATED: ICD-10-CM

## 2023-04-04 DIAGNOSIS — I44.7 LBBB (LEFT BUNDLE BRANCH BLOCK): ICD-10-CM

## 2023-04-04 PROCEDURE — 99214 OFFICE O/P EST MOD 30 MIN: CPT | Performed by: INTERNAL MEDICINE

## 2023-04-04 NOTE — PROGRESS NOTES
HP      Name: Misael Carney ADMIT: (Not on file)   : 1975  PCP: Jarod Zuleta PA-C    MRN: [id number] LOS: 0 days   AGE/SEX: 47 y.o. male  ROOM: Room/bed info not found     Chief Complaint   Patient presents with   • Follow-up     1 mo PM       Subjective        History of present illness  Misael Careny is a 47-year-old male patient who has left bundle branch block, nonischemic cardiomyopathy, ejection fraction of less than 30%, status post BiV ICD implantation on 3/2/2023, is here today for follow-up.  Since the device implant, patient has been feeling better, more energy, less shortness of breath, denies any lower extremity edema.    Past Medical History:   Diagnosis Date   • Abnormal ECG 2022   • CHF (congestive heart failure)    • Congenital heart disease 2022   • Coronary artery disease    • Diabetes mellitus    • LBBB (left bundle branch block) 2023   • Peptic ulcer disease      Past Surgical History:   Procedure Laterality Date   • ANKLE SURGERY Left    • CARDIAC CATHETERIZATION Bilateral 2022    Procedure: Right and Left Heart Cath;  Surgeon: Ridge Lindsay MD;  Location:  [facility] INVAS[facility] LOC[facility];  Service: Cardiovascular;  Laterality: Bilateral;   • CARDIAC DEFIBRILLATOR PLACEMENT  2023   • CARDIAC ELECTROPHYSIOLOGY PROCEDURE N/A 2023    Procedure: BivICD Abbott aware;  Surgeon: Marci Cerda MD;  Location:  [facility] C[facility] INVASIVE LOCATION;  Service: Cardiovascular;  Laterality: N/A;     Family History   Problem Relation Age of Onset   • Asthma Mother    • Heart attack Father    • Heart disease Father    • Heart failure Father    • Hypertension Father      Social History     Tobacco Use   • Smoking status: Never   • Smokeless tobacco: Never   Vaping Use   • Vaping Use: Never used   Substance Use Topics   • Alcohol use: Not Currently     Comment: occ   • Drug use: Never     (Not in a hospital admission)    Allergies:  Nsaids    Review of  systems    Constitutional: Negative.    Respiratory and cardiovascular: As detailed in HPI section.  Gastrointestinal: Negative for constipation, nausea and vomiting negative for abdominal distention, abdominal pain and diarrhea.   Genitourinary: Negative for difficulty urinating and flank pain.   Musculoskeletal: Negative for arthralgias, joint swelling and myalgias.   Skin: Negative for color change, rash and wound.   Neurological: Negative for dizziness, syncope, weakness and headaches.   Hematological: Negative for adenopathy.   Psychiatric/Behavioral: Negative for confusion.   All other systems reviewed and are negative.    Physical Exam  VITALS REVIEWED    General:      well developed, in no acute distress.    Head:      normocephalic and atraumatic.    Eyes:      PERRL/EOM intact, conjunctiva and sclera clear with out nystagmus.    Neck:      no masses, thyromegaly,  trachea central with normal respiratory effort and PMI displaced laterally  Lungs:      Clear to auscultation bilaterally  Heart:       Regular rate and rhythm  Msk:      no deformity or scoliosis noted of thoracic or lumbar spine.    Pulses:      pulses normal in all 4 extremities.    Extremities:       No lower extremity edema  Neurologic:      no focal deficits.   alert oriented x3  Skin:      intact without lesions or rashes.    Psych:      alert and cooperative; normal mood and affect; normal attention span and concentration.      Result Review :               Pertinent cardiac workup    1. Echo 1/18/2023 ejection fraction 27%  2. EKG 9/19/2022 sinus rhythm with left bundle branch block.  3. EKG 3/3/2023 sinus rhythm with biventricular pacing.      Procedures        Assessment and Plan      Misael Carney is a 47-year-old male patient who has nonischemic cardiomyopathy, left bundle branch block, ejection fraction of 27%, status post BiV ICD implantation in March 2023 here today for follow-up.  Post ICD implant, patient has noticed  significant improvement in his symptoms.  He is also on guideline directed medical therapy for his CHF.  His blood pressure is a little bit low, he is complaining of some dizziness.  Advised him to take Lasix only once a day for now.  Device interrogation showed appropriate function, 99% BiV paced.  For now continue same therapy, patient will follow-up with Dr. Lindsay, I recommend getting an echocardiogram in 2 to 3 months, and if the EF is not greater than 45%, then we can perform device optimization for better synchronization with EKG or echocardiogram guidance.    Diagnoses and all orders for this visit:    1. Presence of automatic cardioverter/defibrillator (AICD) (Primary)    2. Cardiomyopathy, dilated    3. LBBB (left bundle branch block)           Return if symptoms worsen or fail to improve.  Patient was given instructions and counseling regarding his condition or for health maintenance advice. Please see specific information pulled into the AVS if appropriate.

## 2023-04-06 ENCOUNTER — APPOINTMENT (OUTPATIENT)
Dept: CARDIAC REHAB | Facility: HOSPITAL | Age: 48
End: 2023-04-06

## 2023-04-06 ENCOUNTER — TELEPHONE (OUTPATIENT)
Dept: CARDIOLOGY | Facility: CLINIC | Age: 48
End: 2023-04-06
Payer: COMMERCIAL

## 2023-04-06 NOTE — TELEPHONE ENCOUNTER
Recvd new FMLA forms called and spoke w rep and she stated that they recvd all of the info and didn't think we needed to fill out anything else but to make sure I needed to speak w pt rep for more info and she transferred me to Tashia DIAZ to find out still needed to fill them out when we just faxed a RTW letter.

## 2023-04-11 ENCOUNTER — APPOINTMENT (OUTPATIENT)
Dept: CARDIAC REHAB | Facility: HOSPITAL | Age: 48
End: 2023-04-11

## 2023-04-13 ENCOUNTER — APPOINTMENT (OUTPATIENT)
Dept: CARDIAC REHAB | Facility: HOSPITAL | Age: 48
End: 2023-04-13

## 2023-04-18 ENCOUNTER — APPOINTMENT (OUTPATIENT)
Dept: CARDIAC REHAB | Facility: HOSPITAL | Age: 48
End: 2023-04-18

## 2023-04-20 ENCOUNTER — APPOINTMENT (OUTPATIENT)
Dept: CARDIAC REHAB | Facility: HOSPITAL | Age: 48
End: 2023-04-20

## 2023-04-25 ENCOUNTER — APPOINTMENT (OUTPATIENT)
Dept: CARDIAC REHAB | Facility: HOSPITAL | Age: 48
End: 2023-04-25

## 2023-04-27 ENCOUNTER — APPOINTMENT (OUTPATIENT)
Dept: CARDIAC REHAB | Facility: HOSPITAL | Age: 48
End: 2023-04-27

## 2023-05-02 ENCOUNTER — APPOINTMENT (OUTPATIENT)
Dept: CARDIAC REHAB | Facility: HOSPITAL | Age: 48
End: 2023-05-02

## 2023-05-04 ENCOUNTER — APPOINTMENT (OUTPATIENT)
Dept: CARDIAC REHAB | Facility: HOSPITAL | Age: 48
End: 2023-05-04

## 2023-05-09 ENCOUNTER — APPOINTMENT (OUTPATIENT)
Dept: CARDIAC REHAB | Facility: HOSPITAL | Age: 48
End: 2023-05-09

## 2023-05-11 ENCOUNTER — APPOINTMENT (OUTPATIENT)
Dept: CARDIAC REHAB | Facility: HOSPITAL | Age: 48
End: 2023-05-11

## 2023-05-16 ENCOUNTER — APPOINTMENT (OUTPATIENT)
Dept: CARDIAC REHAB | Facility: HOSPITAL | Age: 48
End: 2023-05-16

## 2023-05-18 ENCOUNTER — APPOINTMENT (OUTPATIENT)
Dept: CARDIAC REHAB | Facility: HOSPITAL | Age: 48
End: 2023-05-18

## 2023-05-23 ENCOUNTER — APPOINTMENT (OUTPATIENT)
Dept: CARDIAC REHAB | Facility: HOSPITAL | Age: 48
End: 2023-05-23

## 2023-05-25 ENCOUNTER — APPOINTMENT (OUTPATIENT)
Dept: CARDIAC REHAB | Facility: HOSPITAL | Age: 48
End: 2023-05-25

## 2023-05-30 ENCOUNTER — APPOINTMENT (OUTPATIENT)
Dept: CARDIAC REHAB | Facility: HOSPITAL | Age: 48
End: 2023-05-30

## 2023-06-01 ENCOUNTER — APPOINTMENT (OUTPATIENT)
Dept: CARDIAC REHAB | Facility: HOSPITAL | Age: 48
End: 2023-06-01

## 2023-06-06 ENCOUNTER — APPOINTMENT (OUTPATIENT)
Dept: CARDIAC REHAB | Facility: HOSPITAL | Age: 48
End: 2023-06-06

## 2023-06-08 ENCOUNTER — APPOINTMENT (OUTPATIENT)
Dept: CARDIAC REHAB | Facility: HOSPITAL | Age: 48
End: 2023-06-08

## 2023-06-13 ENCOUNTER — APPOINTMENT (OUTPATIENT)
Dept: CARDIAC REHAB | Facility: HOSPITAL | Age: 48
End: 2023-06-13

## 2023-06-15 ENCOUNTER — APPOINTMENT (OUTPATIENT)
Dept: CARDIAC REHAB | Facility: HOSPITAL | Age: 48
End: 2023-06-15

## 2023-07-04 ENCOUNTER — APPOINTMENT (OUTPATIENT)
Dept: CARDIAC REHAB | Facility: HOSPITAL | Age: 48
End: 2023-07-04

## 2023-07-13 ENCOUNTER — APPOINTMENT (OUTPATIENT)
Dept: CARDIAC REHAB | Facility: HOSPITAL | Age: 48
End: 2023-07-13

## 2023-07-25 ENCOUNTER — APPOINTMENT (OUTPATIENT)
Dept: CARDIAC REHAB | Facility: HOSPITAL | Age: 48
End: 2023-07-25

## 2023-07-27 ENCOUNTER — APPOINTMENT (OUTPATIENT)
Dept: CARDIAC REHAB | Facility: HOSPITAL | Age: 48
End: 2023-07-27

## 2023-08-01 ENCOUNTER — APPOINTMENT (OUTPATIENT)
Dept: CARDIAC REHAB | Facility: HOSPITAL | Age: 48
End: 2023-08-01

## 2023-08-02 ENCOUNTER — OFFICE VISIT (OUTPATIENT)
Dept: CARDIOLOGY | Facility: CLINIC | Age: 48
End: 2023-08-02
Payer: COMMERCIAL

## 2023-08-02 ENCOUNTER — CLINICAL SUPPORT NO REQUIREMENTS (OUTPATIENT)
Dept: CARDIOLOGY | Facility: CLINIC | Age: 48
End: 2023-08-02
Payer: COMMERCIAL

## 2023-08-02 VITALS
HEIGHT: 74 IN | HEART RATE: 60 BPM | BODY MASS INDEX: 39.01 KG/M2 | DIASTOLIC BLOOD PRESSURE: 62 MMHG | SYSTOLIC BLOOD PRESSURE: 96 MMHG | OXYGEN SATURATION: 96 % | WEIGHT: 304 LBS

## 2023-08-02 DIAGNOSIS — I50.42 CHRONIC COMBINED SYSTOLIC AND DIASTOLIC CHF (CONGESTIVE HEART FAILURE): ICD-10-CM

## 2023-08-02 DIAGNOSIS — R73.03 PRE-DIABETES: ICD-10-CM

## 2023-08-02 DIAGNOSIS — I44.7 LBBB (LEFT BUNDLE BRANCH BLOCK): ICD-10-CM

## 2023-08-02 DIAGNOSIS — R00.0 TACHYCARDIA: ICD-10-CM

## 2023-08-02 DIAGNOSIS — Z95.810 PRESENCE OF AUTOMATIC CARDIOVERTER/DEFIBRILLATOR (AICD): ICD-10-CM

## 2023-08-02 DIAGNOSIS — E78.49 OTHER HYPERLIPIDEMIA: ICD-10-CM

## 2023-08-02 DIAGNOSIS — I50.41 ACUTE COMBINED SYSTOLIC AND DIASTOLIC CONGESTIVE HEART FAILURE: Primary | ICD-10-CM

## 2023-08-02 DIAGNOSIS — I42.0 CARDIOMYOPATHY, DILATED: ICD-10-CM

## 2023-08-02 DIAGNOSIS — E66.2 CLASS 2 OBESITY WITH ALVEOLAR HYPOVENTILATION, SERIOUS COMORBIDITY, AND BODY MASS INDEX (BMI) OF 38.0 TO 38.9 IN ADULT: ICD-10-CM

## 2023-08-02 DIAGNOSIS — I50.41 ACUTE COMBINED SYSTOLIC AND DIASTOLIC CONGESTIVE HEART FAILURE: ICD-10-CM

## 2023-08-02 DIAGNOSIS — Z95.810 PRESENCE OF AUTOMATIC CARDIOVERTER/DEFIBRILLATOR (AICD): Primary | ICD-10-CM

## 2023-08-02 RX ORDER — BUMETANIDE 1 MG/1
1 TABLET ORAL 2 TIMES DAILY
Qty: 180 TABLET | Refills: 3 | Status: SHIPPED | OUTPATIENT
Start: 2023-08-02

## 2023-08-03 ENCOUNTER — APPOINTMENT (OUTPATIENT)
Dept: CARDIAC REHAB | Facility: HOSPITAL | Age: 48
End: 2023-08-03

## 2023-08-08 ENCOUNTER — APPOINTMENT (OUTPATIENT)
Dept: CARDIAC REHAB | Facility: HOSPITAL | Age: 48
End: 2023-08-08

## 2023-08-15 ENCOUNTER — APPOINTMENT (OUTPATIENT)
Dept: CARDIAC REHAB | Facility: HOSPITAL | Age: 48
End: 2023-08-15

## 2023-08-17 ENCOUNTER — APPOINTMENT (OUTPATIENT)
Dept: CARDIAC REHAB | Facility: HOSPITAL | Age: 48
End: 2023-08-17

## 2023-08-22 ENCOUNTER — APPOINTMENT (OUTPATIENT)
Dept: CARDIAC REHAB | Facility: HOSPITAL | Age: 48
End: 2023-08-22

## 2023-08-24 ENCOUNTER — APPOINTMENT (OUTPATIENT)
Dept: CARDIAC REHAB | Facility: HOSPITAL | Age: 48
End: 2023-08-24

## 2023-08-29 ENCOUNTER — APPOINTMENT (OUTPATIENT)
Dept: CARDIAC REHAB | Facility: HOSPITAL | Age: 48
End: 2023-08-29

## 2023-08-31 ENCOUNTER — APPOINTMENT (OUTPATIENT)
Dept: CARDIAC REHAB | Facility: HOSPITAL | Age: 48
End: 2023-08-31

## 2023-09-05 ENCOUNTER — APPOINTMENT (OUTPATIENT)
Dept: CARDIAC REHAB | Facility: HOSPITAL | Age: 48
End: 2023-09-05

## 2023-09-07 ENCOUNTER — APPOINTMENT (OUTPATIENT)
Dept: CARDIAC REHAB | Facility: HOSPITAL | Age: 48
End: 2023-09-07

## 2023-09-12 ENCOUNTER — APPOINTMENT (OUTPATIENT)
Dept: CARDIAC REHAB | Facility: HOSPITAL | Age: 48
End: 2023-09-12

## 2023-09-14 ENCOUNTER — APPOINTMENT (OUTPATIENT)
Dept: CARDIAC REHAB | Facility: HOSPITAL | Age: 48
End: 2023-09-14

## 2023-09-19 ENCOUNTER — APPOINTMENT (OUTPATIENT)
Dept: CARDIAC REHAB | Facility: HOSPITAL | Age: 48
End: 2023-09-19

## 2023-09-26 ENCOUNTER — OFFICE VISIT (OUTPATIENT)
Dept: CARDIAC REHAB | Facility: HOSPITAL | Age: 48
End: 2023-09-26

## 2023-09-26 DIAGNOSIS — I42.0 CARDIOMYOPATHY, DILATED: Primary | ICD-10-CM

## 2023-09-28 ENCOUNTER — APPOINTMENT (OUTPATIENT)
Dept: CARDIAC REHAB | Facility: HOSPITAL | Age: 48
End: 2023-09-28

## 2023-10-03 ENCOUNTER — APPOINTMENT (OUTPATIENT)
Dept: CARDIAC REHAB | Facility: HOSPITAL | Age: 48
End: 2023-10-03

## 2023-10-05 ENCOUNTER — APPOINTMENT (OUTPATIENT)
Dept: CARDIAC REHAB | Facility: HOSPITAL | Age: 48
End: 2023-10-05

## 2023-10-10 ENCOUNTER — TELEPHONE (OUTPATIENT)
Dept: CARDIOLOGY | Facility: CLINIC | Age: 48
End: 2023-10-10
Payer: COMMERCIAL

## 2023-10-10 ENCOUNTER — OFFICE VISIT (OUTPATIENT)
Dept: CARDIAC REHAB | Facility: HOSPITAL | Age: 48
End: 2023-10-10

## 2023-10-10 DIAGNOSIS — I50.41 ACUTE COMBINED SYSTOLIC AND DIASTOLIC CONGESTIVE HEART FAILURE: ICD-10-CM

## 2023-10-10 DIAGNOSIS — I42.0 CARDIOMYOPATHY, DILATED: Primary | ICD-10-CM

## 2023-10-10 DIAGNOSIS — I42.0 CARDIOMYOPATHY, DILATED: ICD-10-CM

## 2023-10-10 RX ORDER — CARVEDILOL 12.5 MG/1
12.5 TABLET ORAL 2 TIMES DAILY WITH MEALS
Qty: 180 TABLET | Refills: 3 | Status: SHIPPED | OUTPATIENT
Start: 2023-10-10 | End: 2023-10-10 | Stop reason: SDUPTHER

## 2023-10-10 RX ORDER — ATORVASTATIN CALCIUM 40 MG/1
40 TABLET, FILM COATED ORAL NIGHTLY
Qty: 90 TABLET | Refills: 3 | Status: SHIPPED | OUTPATIENT
Start: 2023-10-10

## 2023-10-10 RX ORDER — ATORVASTATIN CALCIUM 40 MG/1
40 TABLET, FILM COATED ORAL NIGHTLY
Qty: 90 TABLET | Refills: 3 | Status: SHIPPED | OUTPATIENT
Start: 2023-10-10 | End: 2023-10-10 | Stop reason: SDUPTHER

## 2023-10-10 RX ORDER — LISINOPRIL 10 MG/1
10 TABLET ORAL
Qty: 90 TABLET | Refills: 3 | Status: SHIPPED | OUTPATIENT
Start: 2023-10-10 | End: 2023-10-10 | Stop reason: SDUPTHER

## 2023-10-10 RX ORDER — LISINOPRIL 10 MG/1
10 TABLET ORAL
Qty: 90 TABLET | Refills: 3 | Status: SHIPPED | OUTPATIENT
Start: 2023-10-10

## 2023-10-10 RX ORDER — CARVEDILOL 12.5 MG/1
12.5 TABLET ORAL 2 TIMES DAILY WITH MEALS
Qty: 180 TABLET | Refills: 3 | Status: SHIPPED | OUTPATIENT
Start: 2023-10-10

## 2023-10-10 NOTE — TELEPHONE ENCOUNTER
Incoming Refill Request      Medication requested (name and dose): lisinopril 10mg, carvedilol 12.5 mg, atorvastatin 40 mg    Pharmacy where request should be sent: [facility]    Additional details provided by patient:     Best call back number: [phone]    Does the patient have less than a 3 day supply:  [x] Yes  [] No    Bhupendra Lin Rep  10/10/23, 11:23 EDT

## 2023-10-17 ENCOUNTER — APPOINTMENT (OUTPATIENT)
Dept: CARDIAC REHAB | Facility: HOSPITAL | Age: 48
End: 2023-10-17

## 2023-10-19 ENCOUNTER — APPOINTMENT (OUTPATIENT)
Dept: CARDIAC REHAB | Facility: HOSPITAL | Age: 48
End: 2023-10-19

## 2023-10-24 ENCOUNTER — APPOINTMENT (OUTPATIENT)
Dept: CARDIAC REHAB | Facility: HOSPITAL | Age: 48
End: 2023-10-24

## 2023-10-31 ENCOUNTER — OFFICE VISIT (OUTPATIENT)
Dept: CARDIAC REHAB | Facility: HOSPITAL | Age: 48
End: 2023-10-31

## 2023-10-31 DIAGNOSIS — I42.0 CARDIOMYOPATHY, DILATED: Primary | ICD-10-CM

## 2023-10-31 NOTE — PROGRESS NOTES
Phase 3 CR    MADDY Wilson, RRT   Rotation Flap Text: The defect edges were debeveled with a #15 scalpel blade.  Given the location of the defect, shape of the defect and the proximity to free margins a rotation flap was deemed most appropriate.  Using a sterile surgical marker, an appropriate rotation flap was drawn incorporating the defect and placing the expected incisions within the relaxed skin tension lines where possible.    The area thus outlined was incised deep to adipose tissue with a #15 scalpel blade.  The skin margins were undermined to an appropriate distance in all directions utilizing iris scissors.

## 2023-11-02 ENCOUNTER — HOSPITAL ENCOUNTER (OUTPATIENT)
Dept: CARDIOLOGY | Facility: HOSPITAL | Age: 48
Discharge: HOME | End: 2023-11-02
Payer: COMMERCIAL

## 2023-11-02 VITALS
SYSTOLIC BLOOD PRESSURE: 130 MMHG | BODY MASS INDEX: 39.01 KG/M2 | WEIGHT: 304 LBS | DIASTOLIC BLOOD PRESSURE: 69 MMHG | HEIGHT: 74 IN

## 2023-11-02 LAB
BH CV ECHO MEAS - ACS: 2.33 CM
BH CV ECHO MEAS - AO MAX PG: 8.4 MMHG
BH CV ECHO MEAS - AO MEAN PG: 4.6 MMHG
BH CV ECHO MEAS - AO ROOT DIAM: 3.6 CM
BH CV ECHO MEAS - AO V2 MAX: 145 CM/SEC
BH CV ECHO MEAS - AO V2 VTI: 29.4 CM
BH CV ECHO MEAS - AVA(I,D): 2.35 CM2
BH CV ECHO MEAS - EDV(CUBED): 152 ML
BH CV ECHO MEAS - EDV(MOD-SP4): 162.9 ML
BH CV ECHO MEAS - EF(MOD-BP): 45 %
BH CV ECHO MEAS - EF(MOD-SP4): 45.7 %
BH CV ECHO MEAS - ESV(CUBED): 93.9 ML
BH CV ECHO MEAS - ESV(MOD-SP4): 88.5 ML
BH CV ECHO MEAS - FS: 14.8 %
BH CV ECHO MEAS - IVS/LVPW: 0.6 CM
BH CV ECHO MEAS - IVSD: 0.75 CM
BH CV ECHO MEAS - LA DIMENSION: 4.1 CM
BH CV ECHO MEAS - LV MASS(C)D: 203.8 GRAMS
BH CV ECHO MEAS - LV MAX PG: 3.3 MMHG
BH CV ECHO MEAS - LV MEAN PG: 1.92 MMHG
BH CV ECHO MEAS - LV V1 MAX: 90.2 CM/SEC
BH CV ECHO MEAS - LV V1 VTI: 17.7 CM
BH CV ECHO MEAS - LVIDD: 5.3 CM
BH CV ECHO MEAS - LVIDS: 4.5 CM
BH CV ECHO MEAS - LVOT AREA: 3.9 CM2
BH CV ECHO MEAS - LVOT DIAM: 2.23 CM
BH CV ECHO MEAS - LVPWD: 1.25 CM
BH CV ECHO MEAS - MV A MAX VEL: 77 CM/SEC
BH CV ECHO MEAS - MV DEC SLOPE: 221.3 CM/SEC2
BH CV ECHO MEAS - MV DEC TIME: 0.27 SEC
BH CV ECHO MEAS - MV E MAX VEL: 60.3 CM/SEC
BH CV ECHO MEAS - MV E/A: 0.78
BH CV ECHO MEAS - MV MAX PG: 2.22 MMHG
BH CV ECHO MEAS - MV MEAN PG: 1.23 MMHG
BH CV ECHO MEAS - MV V2 VTI: 29 CM
BH CV ECHO MEAS - MVA(VTI): 2.38 CM2
BH CV ECHO MEAS - PA ACC TIME: 0.14 SEC
BH CV ECHO MEAS - PA V2 MAX: 89.6 CM/SEC
BH CV ECHO MEAS - PI END-D VEL: 78.7 CM/SEC
BH CV ECHO MEAS - PULM A REVS DUR: 0.1 SEC
BH CV ECHO MEAS - PULM A REVS VEL: 27.7 CM/SEC
BH CV ECHO MEAS - PULM DIAS VEL: 41.1 CM/SEC
BH CV ECHO MEAS - PULM S/D: 1.31
BH CV ECHO MEAS - PULM SYS VEL: 53.7 CM/SEC
BH CV ECHO MEAS - RAP SYSTOLE: 3 MMHG
BH CV ECHO MEAS - RV MAX PG: 1.64 MMHG
BH CV ECHO MEAS - RV V1 MAX: 64.1 CM/SEC
BH CV ECHO MEAS - RV V1 VTI: 13.7 CM
BH CV ECHO MEAS - RVDD: 3.7 CM
BH CV ECHO MEAS - SV(LVOT): 69.1 ML
BH CV ECHO MEAS - SV(MOD-SP4): 74.4 ML

## 2023-11-02 PROCEDURE — 93306 TTE W/DOPPLER COMPLETE: CPT

## 2023-11-21 ENCOUNTER — APPOINTMENT (OUTPATIENT)
Dept: CARDIAC REHAB | Facility: HOSPITAL | Age: 48
End: 2023-11-21

## 2023-11-28 ENCOUNTER — APPOINTMENT (OUTPATIENT)
Dept: CARDIAC REHAB | Facility: HOSPITAL | Age: 48
End: 2023-11-28

## 2023-12-27 ENCOUNTER — APPOINTMENT (OUTPATIENT)
Dept: GENERAL RADIOLOGY | Facility: HOSPITAL | Age: 48
End: 2023-12-27
Payer: COMMERCIAL

## 2023-12-27 ENCOUNTER — HOSPITAL ENCOUNTER (EMERGENCY)
Facility: HOSPITAL | Age: 48
Discharge: HOME | End: 2023-12-27
Attending: EMERGENCY MEDICINE
Payer: COMMERCIAL

## 2023-12-27 VITALS
SYSTOLIC BLOOD PRESSURE: 118 MMHG | HEART RATE: 72 BPM | DIASTOLIC BLOOD PRESSURE: 85 MMHG | TEMPERATURE: 98 F | RESPIRATION RATE: 17 BRPM | WEIGHT: 315 LBS | HEIGHT: 75 IN | BODY MASS INDEX: 39.17 KG/M2 | OXYGEN SATURATION: 94 %

## 2023-12-27 DIAGNOSIS — T59.811A SMOKE INHALATION: Primary | ICD-10-CM

## 2023-12-27 PROCEDURE — 94799 UNLISTED PULMONARY SVC/PX: CPT

## 2023-12-27 PROCEDURE — 71046 X-RAY EXAM CHEST 2 VIEWS: CPT

## 2023-12-27 PROCEDURE — 94664 DEMO&/EVAL PT USE INHALER: CPT

## 2023-12-27 PROCEDURE — 94761 N-INVAS EAR/PLS OXIMETRY MLT: CPT

## 2023-12-27 PROCEDURE — 99282 EMERGENCY DEPT VISIT SF MDM: CPT

## 2023-12-27 PROCEDURE — 94640 AIRWAY INHALATION TREATMENT: CPT

## 2023-12-27 RX ORDER — ALBUTEROL SULFATE 90 UG/1
2 AEROSOL, METERED RESPIRATORY (INHALATION) EVERY 4 HOURS PRN
Qty: 8.5 G | Refills: 0 | Status: SHIPPED | OUTPATIENT
Start: 2023-12-27

## 2023-12-27 RX ORDER — IPRATROPIUM BROMIDE AND ALBUTEROL SULFATE 2.5; .5 MG/3ML; MG/3ML
3 SOLUTION RESPIRATORY (INHALATION) ONCE
Status: COMPLETED | OUTPATIENT
Start: 2023-12-27 | End: 2023-12-27

## 2023-12-27 RX ADMIN — IPRATROPIUM BROMIDE AND ALBUTEROL SULFATE 3 ML: .5; 3 SOLUTION RESPIRATORY (INHALATION) at 06:36
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
## 2023-12-27 NOTE — DISCHARGE INSTRUCTIONS
Rest, no work, next 24 hours  Medication as directed  Follow-up with a primary care provider return for worsening symptoms  Tylenol as needed for discomfort or any fever

## 2024-02-14 ENCOUNTER — CLINICAL SUPPORT NO REQUIREMENTS (OUTPATIENT)
Dept: CARDIOLOGY | Facility: CLINIC | Age: 49
End: 2024-02-14
Payer: COMMERCIAL

## 2024-02-14 ENCOUNTER — OFFICE VISIT (OUTPATIENT)
Dept: CARDIOLOGY | Facility: CLINIC | Age: 49
End: 2024-02-14
Payer: COMMERCIAL

## 2024-02-14 VITALS
HEART RATE: 60 BPM | DIASTOLIC BLOOD PRESSURE: 80 MMHG | HEIGHT: 74 IN | WEIGHT: 307 LBS | OXYGEN SATURATION: 97 % | BODY MASS INDEX: 39.4 KG/M2 | SYSTOLIC BLOOD PRESSURE: 128 MMHG

## 2024-02-14 DIAGNOSIS — R00.0 TACHYCARDIA: ICD-10-CM

## 2024-02-14 DIAGNOSIS — R73.03 PRE-DIABETES: ICD-10-CM

## 2024-02-14 DIAGNOSIS — I42.0 CARDIOMYOPATHY, DILATED: Primary | ICD-10-CM

## 2024-02-14 DIAGNOSIS — I27.20 PULMONARY HYPERTENSION: ICD-10-CM

## 2024-02-14 DIAGNOSIS — I50.42 CHRONIC COMBINED SYSTOLIC AND DIASTOLIC CHF (CONGESTIVE HEART FAILURE): ICD-10-CM

## 2024-02-14 DIAGNOSIS — E78.49 OTHER HYPERLIPIDEMIA: ICD-10-CM

## 2024-02-14 DIAGNOSIS — I50.41 ACUTE COMBINED SYSTOLIC AND DIASTOLIC CONGESTIVE HEART FAILURE: ICD-10-CM

## 2024-02-14 DIAGNOSIS — Z95.810 PRESENCE OF AUTOMATIC CARDIOVERTER/DEFIBRILLATOR (AICD): ICD-10-CM

## 2024-02-14 DIAGNOSIS — E66.2 CLASS 2 OBESITY WITH ALVEOLAR HYPOVENTILATION, SERIOUS COMORBIDITY, AND BODY MASS INDEX (BMI) OF 38.0 TO 38.9 IN ADULT: ICD-10-CM

## 2024-02-14 DIAGNOSIS — I36.1 NONRHEUMATIC TRICUSPID VALVE REGURGITATION: ICD-10-CM

## 2024-02-14 DIAGNOSIS — I44.7 LBBB (LEFT BUNDLE BRANCH BLOCK): ICD-10-CM

## 2024-07-17 RX ORDER — BUMETANIDE 1 MG/1
1 TABLET ORAL 2 TIMES DAILY
Qty: 180 TABLET | Refills: 3 | Status: SHIPPED | OUTPATIENT
Start: 2024-07-17

## 2024-07-17 NOTE — TELEPHONE ENCOUNTER
Rx Refill Note  Requested Prescriptions     Pending Prescriptions Disp Refills    bumetanide (BUMEX) 1 MG tablet 180 tablet 3     Sig: Take 1 tablet by mouth 2 (Two) Times a Day.      Last office visit with prescribing clinician: 2/14/2024     Next office visit with prescribing clinician: 8/28/2024                             Jeny Claire MA  07/17/24, 11:43 EDT

## 2024-08-18 NOTE — PROGRESS NOTES
Encounter Date:08/28/2024        Patient ID: Misael Carney is a 48 y.o. male.      Chief Complaint:      History of Present Illness  48 y.o. male who was recently 9/22 admitted to the hospital due to complaints of difficulty breathing, dyspnea on exertion.  He works as a  and recently noted symptoms of difficulty breathing and felt like he had pneumonia without any fever or chills.  Lifting more than 5 pounds would make him out of breath and he could only walk up to 5-10 steps without getting severe shortness of breath.  The symptoms were associated with midsternal chest pain.  He denies orthopnea or PND but does report recent weight gain of at least 10 pounds in the last 2 weeks.  He does not have sleep apnea but he has not had any tests.  While in the emergency room his troponin was negative, proBNP was elevated 4500, renal function was normal and D-dimer was -0.52.  His ECG showed new left bundle branch block when compared to his previous ECG from 2017.  His echocardiogram showed significantly reduced LV function with EF of 24%, moderate pulmonary hypertension.  Cardiac catheterization ruled out any obstructive coronary disease however his LVEDP was 35 mmHg  He was treated with IV diuretics which were later switched to oral.  During the hospitalization his weight decreased from 312 pounds to 294 pounds.  Repeat echocardiogram in January 2023 shows EF of 27%.  BiV ICD placement by Dr. Hernandez in March 2023.  Device check in the office shows well-functioning [place] device.  No VT or VF episodes detected.  0% AT/AF burden.  5.8 years of battery life remaining     Today he comes in feeling well.  Current cardiac medications include aspirin, statin, Bumex, Coreg, lisinopril.   He is not NYHA class I-II.  Recently went hiking in [place] with no problems.      The following portions of the patient's history were reviewed and updated as appropriate: allergies, current medications, past family history,  past medical history, past social history, past surgical history, and problem list.    Review of Systems   Constitutional: Positive for malaise/fatigue.   Cardiovascular:  Positive for leg swelling. Negative for chest pain and palpitations.   Respiratory:  Negative for shortness of breath.    Skin:  Negative for rash.   Neurological:  Negative for dizziness, light-headedness and numbness.         Current Outpatient Medications:     aspirin 81 MG EC tablet, Take 1 tablet by mouth Daily., Disp: 30 tablet, Rfl: 0    atorvastatin (LIPITOR) 40 MG tablet, Take 1 tablet by mouth Every Night., Disp: 90 tablet, Rfl: 3    bumetanide (BUMEX) 1 MG tablet, Take 1 tablet by mouth 2 (Two) Times a Day., Disp: 180 tablet, Rfl: 3    carvedilol (COREG) 12.5 MG tablet, Take 1 tablet by mouth 2 (Two) Times a Day With Meals., Disp: 180 tablet, Rfl: 3    fluticasone (Flonase) 50 MCG/ACT nasal spray, instill 2 sprays into the nostril(s) daily (Patient taking differently: 2 sprays into the nostril(s) as directed by provider Daily As Needed.), Disp: 16 g, Rfl: 0    lisinopril (PRINIVIL,ZESTRIL) 10 MG tablet, Take 1 tablet by mouth Daily., Disp: 90 tablet, Rfl: 3    Current outpatient and discharge medications have been reconciled for the patient.  Reviewed by: Ridge Lindsay MD       Allergies   Allergen Reactions    Nsaids GI Intolerance       Family History   Problem Relation Age of Onset    Asthma Mother     Heart attack Father     Heart disease Father     Heart failure Father     Hypertension Father        Past Surgical History:   Procedure Laterality Date    ANKLE SURGERY Left 2016    CARDIAC CATHETERIZATION Bilateral 09/21/2022    Procedure: Right and Left Heart Cath;  Surgeon: Ridge Lindsay MD;  Location: [facility] CATH INVASIVE LOCATION;  Service: Cardiovascular;  Laterality: Bilateral;    CARDIAC DEFIBRILLATOR PLACEMENT  03/02/2023    CARDIAC ELECTROPHYSIOLOGY PROCEDURE N/A 03/02/2023    Procedure: BivICD Abbott aware;  Surgeon:  "Marci Cerda MD;  Location: [facility] CATH INVASIVE LOCATION;  Service: Cardiovascular;  Laterality: N/A;       Past Medical History:   Diagnosis Date    Abnormal ECG 9/19/2022    CHF (congestive heart failure)     Congenital heart disease 9/19/2022    Coronary artery disease     Diabetes mellitus     LBBB (left bundle branch block) 04/04/2023    Other hyperlipidemia 09/19/2022    Peptic ulcer disease        Family History   Problem Relation Age of Onset    Asthma Mother     Heart attack Father     Heart disease Father     Heart failure Father     Hypertension Father        Social History     Socioeconomic History    Marital status: Single   Tobacco Use    Smoking status: Never    Smokeless tobacco: Never   Vaping Use    Vaping status: Never Used   Substance and Sexual Activity    Alcohol use: Not Currently     Comment: occ    Drug use: Never    Sexual activity: Yes     Partners: Female     Birth control/protection: Condom, Natural family planning/Rhythm, Rhythm               Objective:       Physical Exam    /76   Pulse 60   Ht 188 cm (74\")   Wt (!) 143 kg (316 lb)   SpO2 95%   BMI 40.57 kg/m²   The patient is alert, oriented and in no distress.    Vital signs as noted above.    Head and neck revealed no carotid bruits or jugular venous distension.  No thyromegaly or lymphadenopathy is present.    Lungs clear.  No wheezing.  Breath sounds are normal bilaterally.    Heart normal first and second heart sounds.  No murmur..  No pericardial rub is present.  No gallop is present.    Abdomen soft and nontender.  No organomegaly is present.    Extremities revealed good peripheral pulses without any pedal edema.    Skin warm and dry.    Musculoskeletal system is grossly normal.    CNS grossly normal.           Diagnosis Plan   1. Cardiomyopathy, dilated        2. Acute combined systolic and diastolic congestive heart failure        3. Presence of automatic cardioverter/defibrillator (AICD)        4. LBBB " (left bundle branch block)        5. Tachycardia        6. Class 2 obesity with alveolar hypoventilation, serious comorbidity, and body mass index (BMI) of 38.0 to 38.9 in adult        7. Pre-diabetes        8. Other hyperlipidemia        9. Pulmonary hypertension        10. Nonrheumatic tricuspid valve regurgitation        11. Chronic combined systolic and diastolic CHF (congestive heart failure)        LAB RESULTS (LAST 7 DAYS)    CBC        BMP        CMP         BNP        TROPONIN        CoAg        Creatinine Clearance  CrCl cannot be calculated (Patient's most recent lab result is older than the maximum 30 days allowed.).    ABG        Radiology  No radiology results for the last day    EKG    ECG 12 Lead    Date/Time: 8/28/2024 11:18 AM  Performed by: Ridge Lindsay MD    Authorized by: Ridge Lindsay MD  Comparison: compared with previous ECG   Similar to previous ECG  Comments: ECG shows dual paced rhythm.  Heart rate 60.  FL interval 142, QRS duration 165, QTc 518 ms.          Stress test      Echocardiogram  Results for orders placed in visit on 08/02/23    Adult Transthoracic Echo Complete W/ Cont if Necessary Per Protocol    Interpretation Summary    Left ventricular ejection fraction appears to be 41 - 45%.    The left ventricular cavity is moderately dilated.    Left ventricular diastolic function is consistent with (grade I) impaired relaxation.    The left atrial cavity is dilated.    Estimated right ventricular systolic pressure from tricuspid regurgitation is normal (<35 mmHg).      Cardiac catheterization  Results for orders placed during the hospital encounter of 09/19/22    Cardiac Catheterization/Vascular Study    Narrative  PROCEDURE PERFORMED  Ultrasound guided vascular access  Right heart Catheterization  Left Heart Catheterization  Coronary Angiogram  Moderate Sedation    INDICATIONS FOR PROCEDURE  46-year-old man with multiple cardiovascular risk factors and morbid obesity presented with  new onset of heart failure with reduced ejection fraction and was found to have pulmonary hypertension.  Risk and benefit of right and left heart cath were discussed with the patient and he was brought in for elective coronary angiogram and right heart cath.    PROCEDURE IN DETAIL  Informed consent was obtained from the patient after explaining the risks, benefits, and alternative options of the procedure. After obtaining informed consent, the patient was brought to the cath lab and was prepped in a sterile fashion. Lidocaine 1% was used for local anesthesia into the right IJ access site. Right IJ vein was accessed using the micropuncture needle under ultrasound guidance and micropuncture wire advanced under flouroscopy. A 7 German vascular sheath was put into place percutaneously over guide-wire. Guide wires were removed. A 7Fr swan hiram catheter was advanced to wedge position. RA, RV and PA and wedge pressures were recorded. PA sat and arterial sats recorded and the swan was subsequently removed in its entirety. Next, The right radial artery was accessed with an angiocath needle under ultrasound guidance. A 6F slendersheath was inserted successfully.  Afterwards, 6F JR4 diagnostic catheter was advanced over a wire into the ascending aorta and was used to cross the AV and obtain LV pressures.  Gradient across the AV measured via pullback technique.  Next, 6F JL3.5 and JR4 catheters were used to engage the ostia of the left main and RCA respectively. Images of the right and left coronary systems were obtained. The catheters were exchanged over a wire and subsequently removed. The patient tolerated the procedure well without any complications. The pictures were reviewed at the end of the procedure. A TR band was applied.    CORONARY ANGIOGRAM FINDINGS:  Dominance: Right  #Left main: Left main is  A large vessel Which gives rise to the LAD left circumflex artery.  There is no angiographically significant  stenosis  #Left Anterior Descending Artery: LAD is a large caliber vessel which gives rise to several septal perforators and several diagonal branches. There is no angiographically significant stenosis  #Left Circumflex: The LCx is a large, non-dominant vessel which gives rise to OM branches. It is angiographically free of disease  #Right Coronary Artery: The RCA is a large, dominant vessel which gives rise to several small caliber branches and the PDA and PLV.  Right coronary artery is angiographically free from any significant disease.      [facility] HEMODYNAMICS:  LVp 109/24, 32  AOp 104/87, 96  No significant gradient on pullback across aortic valve.    [facility] HEMODYNAMICS:  RA 27/25, 24  RV 52/14, 27  PA 50/30, 42  PCW 33/34, 30  AO Sat 98%  PA Sat 73 %    Leif CO 7.68    Leif CI 2.9    ESTIMATED BLOOD LOSS:  10 ml    COMPLICATIONS:  None    PROCEDURE DATA:  Contrast Used:20 cc  Sedation Time: 27 minutes 55 seconds    IMPRESSIONS  No angiographic evidence of obstructive CAD  Elevated left heart filling pressures with LVEDP of 35 mmHg  Non ischemic cardiomyopathy    RECOMMENDATIONS  -- GDMT for cardiomyopathy  -- Increase IV diuretics  -- Risk factor and lifestyle modifications  -- Repeat echocardiogram after 3 months of GDMT          Assessment and Plan       Diagnoses and all orders for this visit:    1. Cardiomyopathy, dilated (Primary)    2. Acute combined systolic and diastolic congestive heart failure    3. Presence of automatic cardioverter/defibrillator (AICD)    4. LBBB (left bundle branch block)    5. Tachycardia    6. Class 2 obesity with alveolar hypoventilation, serious comorbidity, and body mass index (BMI) of 38.0 to 38.9 in adult    7. Pre-diabetes    8. Other hyperlipidemia    9. Pulmonary hypertension    10. Nonrheumatic tricuspid valve regurgitation    11. Chronic combined systolic and diastolic CHF (congestive heart failure)         1. Cardiomyopathy, dilated (HCC) (Primary)  Echocardiogram from  January 2023 shows EF of 27% with severe dilation of LV cavity.  No significant valvular abnormalities  Continue to uptitrate GDMT  Currently on ACE inhibitor, beta-blocker  Echocardiogram August 2023 shows improvement in LV function with EF of 40 to 45%.  Aldactone stopped due to low blood pressure  He is unable to afford Jardiance.  Status post BiV ICD placement in March 2023.  Device check in the office with no episodes of VT/VF.  Capturing, sensing and impedance checked.  Battery life of 5.8 years remaining.  Emphasized the importance of diet, medication compliance, provided education regarding diuretics    2. Acute combined systolic and diastolic congestive heart failure (HCC)  Previous EF 24% with moderate pulmonary hypertension  Echocardiogram in August 2023 shows EF of 40 to 45%  BiV ICD placement in March 2023  Currently on Lisinopril, Coreg   Continue Bumex     3. Tachycardia  Tachycardia has resolved with diuretics and beta-blocker.  Heart rate is in the 60s now     4. Pulmonary hypertension (HCC)  Needs sleep study.  Likely secondary to sleep apnea     5. Class 2 obesity with alveolar hypoventilation, serious comorbidity, and body mass index (BMI) of 38.0 to 38.9 in adult (HCC)  I have provided weight loss counseling for the patient and discussed cardiac diet. I extensively discussed with the patient the cardiovascular risks associated with obesity and metabolic syndrome.  I gave ample time for questions and they were answered to their satisfaction.  BMI is 40.6.  He weighs 316 pounds.

## 2024-08-28 ENCOUNTER — OFFICE VISIT (OUTPATIENT)
Dept: CARDIOLOGY | Facility: CLINIC | Age: 49
End: 2024-08-28
Payer: COMMERCIAL

## 2024-08-28 ENCOUNTER — CLINICAL SUPPORT NO REQUIREMENTS (OUTPATIENT)
Dept: CARDIOLOGY | Facility: CLINIC | Age: 49
End: 2024-08-28
Payer: COMMERCIAL

## 2024-08-28 VITALS
OXYGEN SATURATION: 95 % | HEIGHT: 74 IN | WEIGHT: 315 LBS | BODY MASS INDEX: 40.43 KG/M2 | HEART RATE: 60 BPM | SYSTOLIC BLOOD PRESSURE: 113 MMHG | DIASTOLIC BLOOD PRESSURE: 76 MMHG

## 2024-08-28 DIAGNOSIS — I50.42 CHRONIC COMBINED SYSTOLIC AND DIASTOLIC CHF (CONGESTIVE HEART FAILURE): ICD-10-CM

## 2024-08-28 DIAGNOSIS — I42.0 CARDIOMYOPATHY, DILATED: Primary | ICD-10-CM

## 2024-08-28 DIAGNOSIS — I36.1 NONRHEUMATIC TRICUSPID VALVE REGURGITATION: ICD-10-CM

## 2024-08-28 DIAGNOSIS — R73.03 PRE-DIABETES: ICD-10-CM

## 2024-08-28 DIAGNOSIS — Z95.810 PRESENCE OF AUTOMATIC CARDIOVERTER/DEFIBRILLATOR (AICD): ICD-10-CM

## 2024-08-28 DIAGNOSIS — R00.0 TACHYCARDIA: ICD-10-CM

## 2024-08-28 DIAGNOSIS — I44.7 LBBB (LEFT BUNDLE BRANCH BLOCK): ICD-10-CM

## 2024-08-28 DIAGNOSIS — E78.49 OTHER HYPERLIPIDEMIA: ICD-10-CM

## 2024-08-28 DIAGNOSIS — E66.2 CLASS 2 OBESITY WITH ALVEOLAR HYPOVENTILATION, SERIOUS COMORBIDITY, AND BODY MASS INDEX (BMI) OF 38.0 TO 38.9 IN ADULT: ICD-10-CM

## 2024-08-28 DIAGNOSIS — I50.41 ACUTE COMBINED SYSTOLIC AND DIASTOLIC CONGESTIVE HEART FAILURE: ICD-10-CM

## 2024-08-28 DIAGNOSIS — I27.20 PULMONARY HYPERTENSION: ICD-10-CM

## 2024-10-10 DIAGNOSIS — I50.41 ACUTE COMBINED SYSTOLIC AND DIASTOLIC CONGESTIVE HEART FAILURE: ICD-10-CM

## 2024-10-10 DIAGNOSIS — I42.0 CARDIOMYOPATHY, DILATED: ICD-10-CM

## 2024-10-10 RX ORDER — CARVEDILOL 12.5 MG/1
12.5 TABLET ORAL 2 TIMES DAILY WITH MEALS
Qty: 180 TABLET | Refills: 3 | Status: CANCELLED | OUTPATIENT
Start: 2024-10-10

## 2024-10-10 RX ORDER — CARVEDILOL 12.5 MG/1
12.5 TABLET ORAL 2 TIMES DAILY WITH MEALS
Qty: 180 TABLET | Refills: 3 | Status: SHIPPED | OUTPATIENT
Start: 2024-10-10

## 2024-10-10 RX ORDER — LISINOPRIL 10 MG/1
10 TABLET ORAL
Qty: 90 TABLET | Refills: 3 | Status: SHIPPED | OUTPATIENT
Start: 2024-10-10

## 2024-10-10 RX ORDER — LISINOPRIL 10 MG/1
10 TABLET ORAL
Qty: 90 TABLET | Refills: 3 | Status: CANCELLED | OUTPATIENT
Start: 2024-10-10

## 2024-10-10 RX ORDER — ATORVASTATIN CALCIUM 40 MG/1
40 TABLET, FILM COATED ORAL NIGHTLY
Qty: 90 TABLET | Refills: 3 | Status: SHIPPED | OUTPATIENT
Start: 2024-10-10

## 2024-10-10 RX ORDER — ATORVASTATIN CALCIUM 40 MG/1
40 TABLET, FILM COATED ORAL NIGHTLY
Qty: 90 TABLET | Refills: 3 | Status: CANCELLED | OUTPATIENT
Start: 2024-10-10

## 2025-01-02 ENCOUNTER — APPOINTMENT (OUTPATIENT)
Dept: CT IMAGING | Facility: HOSPITAL | Age: 50
End: 2025-01-02
Payer: COMMERCIAL

## 2025-01-02 ENCOUNTER — APPOINTMENT (OUTPATIENT)
Dept: GENERAL RADIOLOGY | Facility: HOSPITAL | Age: 50
End: 2025-01-02
Payer: COMMERCIAL

## 2025-01-02 ENCOUNTER — HOSPITAL ENCOUNTER (OUTPATIENT)
Facility: HOSPITAL | Age: 50
Setting detail: OBSERVATION
Discharge: HOME | End: 2025-01-03
Attending: EMERGENCY MEDICINE | Admitting: INTERNAL MEDICINE
Payer: COMMERCIAL

## 2025-01-02 DIAGNOSIS — R47.9 SPEECH DISTURBANCE, UNSPECIFIED TYPE: Primary | ICD-10-CM

## 2025-01-02 DIAGNOSIS — R29.818 FOCAL NEUROLOGICAL DEFICIT: ICD-10-CM

## 2025-01-02 LAB
ABO GROUP BLD: NORMAL
ALBUMIN SERPL-MCNC: 4.2 G/DL (ref 3.5–5.2)
ALBUMIN/GLOB SERPL: 1.1 G/DL
ALP SERPL-CCNC: 101 U/L (ref 39–117)
ALT SERPL W P-5'-P-CCNC: 25 U/L (ref 1–41)
ANION GAP SERPL CALCULATED.3IONS-SCNC: 9.7 MMOL/L (ref 5–15)
APTT PPP: 28.5 SECONDS (ref 22.7–35.4)
AST SERPL-CCNC: 32 U/L (ref 1–40)
BASOPHILS # BLD AUTO: 0.06 10*3/MM3 (ref 0–0.2)
BASOPHILS NFR BLD AUTO: 0.7 % (ref 0–1.5)
BILIRUB SERPL-MCNC: 0.9 MG/DL (ref 0–1.2)
BLD GP AB SCN SERPL QL: NEGATIVE
BUN SERPL-MCNC: 16 MG/DL (ref 6–20)
BUN/CREAT SERPL: 12.5 (ref 7–25)
CALCIUM SPEC-SCNC: 9.1 MG/DL (ref 8.6–10.5)
CHLORIDE SERPL-SCNC: 102 MMOL/L (ref 98–107)
CO2 SERPL-SCNC: 27.3 MMOL/L (ref 22–29)
CREAT SERPL-MCNC: 1.28 MG/DL (ref 0.76–1.27)
DEPRECATED RDW RBC AUTO: 47.5 FL (ref 37–54)
EGFRCR SERPLBLD CKD-EPI 2021: 69.5 ML/MIN/1.73
EOSINOPHIL # BLD AUTO: 0.34 10*3/MM3 (ref 0–0.4)
EOSINOPHIL NFR BLD AUTO: 3.7 % (ref 0.3–6.2)
ERYTHROCYTE [DISTWIDTH] IN BLOOD BY AUTOMATED COUNT: 13.5 % (ref 12.3–15.4)
GLOBULIN UR ELPH-MCNC: 3.7 GM/DL
GLUCOSE SERPL-MCNC: 120 MG/DL (ref 65–99)
HCT VFR BLD AUTO: 41.9 % (ref 37.5–51)
HGB BLD-MCNC: 13.2 G/DL (ref 13–17.7)
HOLD SPECIMEN: NORMAL
HOLD SPECIMEN: NORMAL
IMM GRANULOCYTES # BLD AUTO: 0.02 10*3/MM3 (ref 0–0.05)
IMM GRANULOCYTES NFR BLD AUTO: 0.2 % (ref 0–0.5)
INR PPP: 1.11 (ref 0.9–1.1)
LYMPHOCYTES # BLD AUTO: 1.76 10*3/MM3 (ref 0.7–3.1)
LYMPHOCYTES NFR BLD AUTO: 19.3 % (ref 19.6–45.3)
MCH RBC QN AUTO: 29.9 PG (ref 26.6–33)
MCHC RBC AUTO-ENTMCNC: 31.5 G/DL (ref 31.5–35.7)
MCV RBC AUTO: 95 FL (ref 79–97)
MONOCYTES # BLD AUTO: 0.93 10*3/MM3 (ref 0.1–0.9)
MONOCYTES NFR BLD AUTO: 10.2 % (ref 5–12)
NEUTROPHILS NFR BLD AUTO: 6 10*3/MM3 (ref 1.7–7)
NEUTROPHILS NFR BLD AUTO: 65.9 % (ref 42.7–76)
NRBC BLD AUTO-RTO: 0 /100 WBC (ref 0–0.2)
PLATELET # BLD AUTO: 600 10*3/MM3 (ref 140–450)
PMV BLD AUTO: 9.1 FL (ref 6–12)
POTASSIUM SERPL-SCNC: 3.8 MMOL/L (ref 3.5–5.2)
PROT SERPL-MCNC: 7.9 G/DL (ref 6–8.5)
PROTHROMBIN TIME: 14.4 SECONDS (ref 11.7–14.2)
RBC # BLD AUTO: 4.41 10*6/MM3 (ref 4.14–5.8)
RH BLD: POSITIVE
SODIUM SERPL-SCNC: 139 MMOL/L (ref 136–145)
T&S EXPIRATION DATE: NORMAL
WBC NRBC COR # BLD AUTO: 9.11 10*3/MM3 (ref 3.4–10.8)
WHOLE BLOOD HOLD COAG: NORMAL
WHOLE BLOOD HOLD SPECIMEN: NORMAL

## 2025-01-02 PROCEDURE — 0042T HC CT CEREBRAL PERFUSION W/WO CONTRAST: CPT

## 2025-01-02 PROCEDURE — 86900 BLOOD TYPING SEROLOGIC ABO: CPT

## 2025-01-02 PROCEDURE — 99285 EMERGENCY DEPT VISIT HI MDM: CPT

## 2025-01-02 PROCEDURE — 80053 COMPREHEN METABOLIC PANEL: CPT | Performed by: EMERGENCY MEDICINE

## 2025-01-02 PROCEDURE — 70496 CT ANGIOGRAPHY HEAD: CPT

## 2025-01-02 PROCEDURE — 86900 BLOOD TYPING SEROLOGIC ABO: CPT | Performed by: EMERGENCY MEDICINE

## 2025-01-02 PROCEDURE — 86901 BLOOD TYPING SEROLOGIC RH(D): CPT

## 2025-01-02 PROCEDURE — 82948 REAGENT STRIP/BLOOD GLUCOSE: CPT

## 2025-01-02 PROCEDURE — 86901 BLOOD TYPING SEROLOGIC RH(D): CPT | Performed by: EMERGENCY MEDICINE

## 2025-01-02 PROCEDURE — 36415 COLL VENOUS BLD VENIPUNCTURE: CPT

## 2025-01-02 PROCEDURE — 25510000001 IOPAMIDOL PER 1 ML: Performed by: EMERGENCY MEDICINE

## 2025-01-02 PROCEDURE — 85730 THROMBOPLASTIN TIME PARTIAL: CPT | Performed by: EMERGENCY MEDICINE

## 2025-01-02 PROCEDURE — 85610 PROTHROMBIN TIME: CPT | Performed by: EMERGENCY MEDICINE

## 2025-01-02 PROCEDURE — 70498 CT ANGIOGRAPHY NECK: CPT

## 2025-01-02 PROCEDURE — 86850 RBC ANTIBODY SCREEN: CPT | Performed by: EMERGENCY MEDICINE

## 2025-01-02 PROCEDURE — 93005 ELECTROCARDIOGRAM TRACING: CPT | Performed by: EMERGENCY MEDICINE

## 2025-01-02 PROCEDURE — 70450 CT HEAD/BRAIN W/O DYE: CPT

## 2025-01-02 PROCEDURE — 71045 X-RAY EXAM CHEST 1 VIEW: CPT

## 2025-01-02 PROCEDURE — 85025 COMPLETE CBC W/AUTO DIFF WBC: CPT | Performed by: EMERGENCY MEDICINE

## 2025-01-02 RX ORDER — SODIUM CHLORIDE 0.9 % (FLUSH) 0.9 %
10 SYRINGE (ML) INJECTION AS NEEDED
Status: DISCONTINUED | OUTPATIENT
Start: 2025-01-02 | End: 2025-01-03 | Stop reason: HOSPADM

## 2025-01-02 RX ORDER — ONDANSETRON 2 MG/ML
4 INJECTION INTRAMUSCULAR; INTRAVENOUS EVERY 6 HOURS PRN
Status: DISCONTINUED | OUTPATIENT
Start: 2025-01-02 | End: 2025-01-03 | Stop reason: HOSPADM

## 2025-01-02 RX ORDER — ASPIRIN 300 MG/1
300 SUPPOSITORY RECTAL DAILY
Status: DISCONTINUED | OUTPATIENT
Start: 2025-01-03 | End: 2025-01-03 | Stop reason: HOSPADM

## 2025-01-02 RX ORDER — ATORVASTATIN CALCIUM 40 MG/1
80 TABLET, FILM COATED ORAL NIGHTLY
Status: DISCONTINUED | OUTPATIENT
Start: 2025-01-02 | End: 2025-01-03 | Stop reason: HOSPADM

## 2025-01-02 RX ORDER — ACETAMINOPHEN 650 MG/1
650 SUPPOSITORY RECTAL EVERY 4 HOURS PRN
Status: DISCONTINUED | OUTPATIENT
Start: 2025-01-02 | End: 2025-01-03 | Stop reason: HOSPADM

## 2025-01-02 RX ORDER — BISACODYL 10 MG
10 SUPPOSITORY, RECTAL RECTAL DAILY PRN
Status: DISCONTINUED | OUTPATIENT
Start: 2025-01-02 | End: 2025-01-03 | Stop reason: HOSPADM

## 2025-01-02 RX ORDER — CLOPIDOGREL BISULFATE 75 MG/1
300 TABLET ORAL ONCE
Status: COMPLETED | OUTPATIENT
Start: 2025-01-02 | End: 2025-01-03

## 2025-01-02 RX ORDER — SODIUM CHLORIDE 0.9 % (FLUSH) 0.9 %
10 SYRINGE (ML) INJECTION EVERY 12 HOURS SCHEDULED
Status: DISCONTINUED | OUTPATIENT
Start: 2025-01-02 | End: 2025-01-03 | Stop reason: HOSPADM

## 2025-01-02 RX ORDER — IOPAMIDOL 755 MG/ML
50 INJECTION, SOLUTION INTRAVASCULAR
Status: COMPLETED | OUTPATIENT
Start: 2025-01-02 | End: 2025-01-02

## 2025-01-02 RX ORDER — ALUMINA, MAGNESIA, AND SIMETHICONE 2400; 2400; 240 MG/30ML; MG/30ML; MG/30ML
7.5 SUSPENSION ORAL EVERY 4 HOURS PRN
Status: DISCONTINUED | OUTPATIENT
Start: 2025-01-02 | End: 2025-01-03 | Stop reason: HOSPADM

## 2025-01-02 RX ORDER — IOPAMIDOL 755 MG/ML
100 INJECTION, SOLUTION INTRAVASCULAR
Status: COMPLETED | OUTPATIENT
Start: 2025-01-02 | End: 2025-01-02

## 2025-01-02 RX ORDER — SODIUM CHLORIDE 9 MG/ML
40 INJECTION, SOLUTION INTRAVENOUS AS NEEDED
Status: DISCONTINUED | OUTPATIENT
Start: 2025-01-02 | End: 2025-01-03 | Stop reason: HOSPADM

## 2025-01-02 RX ORDER — ACETAMINOPHEN 325 MG/1
650 TABLET ORAL EVERY 4 HOURS PRN
Status: DISCONTINUED | OUTPATIENT
Start: 2025-01-02 | End: 2025-01-03 | Stop reason: HOSPADM

## 2025-01-02 RX ORDER — ASPIRIN 81 MG/1
81 TABLET, CHEWABLE ORAL DAILY
Status: DISCONTINUED | OUTPATIENT
Start: 2025-01-03 | End: 2025-01-03 | Stop reason: HOSPADM

## 2025-01-02 RX ORDER — CLOPIDOGREL BISULFATE 75 MG/1
75 TABLET ORAL DAILY
Status: DISCONTINUED | OUTPATIENT
Start: 2025-01-03 | End: 2025-01-03 | Stop reason: HOSPADM

## 2025-01-02 RX ADMIN — IOPAMIDOL 100 ML: 755 INJECTION, SOLUTION INTRAVENOUS at 22:48

## 2025-01-02 RX ADMIN — IOPAMIDOL 50 ML: 755 INJECTION, SOLUTION INTRAVENOUS at 22:45

## 2025-01-03 ENCOUNTER — APPOINTMENT (OUTPATIENT)
Dept: CARDIOLOGY | Facility: HOSPITAL | Age: 50
End: 2025-01-03
Payer: COMMERCIAL

## 2025-01-03 ENCOUNTER — APPOINTMENT (OUTPATIENT)
Dept: MRI IMAGING | Facility: HOSPITAL | Age: 50
End: 2025-01-03
Payer: COMMERCIAL

## 2025-01-03 ENCOUNTER — READMISSION MANAGEMENT (OUTPATIENT)
Dept: CALL CENTER | Facility: HOSPITAL | Age: 50
End: 2025-01-03
Payer: COMMERCIAL

## 2025-01-03 VITALS
HEIGHT: 74 IN | OXYGEN SATURATION: 99 % | WEIGHT: 315 LBS | TEMPERATURE: 97.6 F | HEART RATE: 60 BPM | SYSTOLIC BLOOD PRESSURE: 118 MMHG | RESPIRATION RATE: 16 BRPM | DIASTOLIC BLOOD PRESSURE: 81 MMHG | BODY MASS INDEX: 40.43 KG/M2

## 2025-01-03 PROBLEM — R47.9 SPEECH DISTURBANCE: Status: ACTIVE | Noted: 2025-01-03

## 2025-01-03 PROBLEM — R29.818 FOCAL NEUROLOGICAL DEFICIT: Status: ACTIVE | Noted: 2025-01-03

## 2025-01-03 PROBLEM — E66.01 OBESITY, CLASS III, BMI 40-49.9 (MORBID OBESITY): Status: ACTIVE | Noted: 2025-01-03

## 2025-01-03 PROBLEM — G45.9 TIA (TRANSIENT ISCHEMIC ATTACK): Status: ACTIVE | Noted: 2025-01-03

## 2025-01-03 PROBLEM — E66.813 OBESITY, CLASS III, BMI 40-49.9 (MORBID OBESITY): Status: ACTIVE | Noted: 2025-01-03

## 2025-01-03 LAB
ALBUMIN SERPL-MCNC: 4.4 G/DL (ref 3.5–5.2)
ALBUMIN/GLOB SERPL: 1.2 G/DL
ALP SERPL-CCNC: 90 U/L (ref 39–117)
ALT SERPL W P-5'-P-CCNC: 25 U/L (ref 1–41)
AMMONIA BLD-SCNC: 37 UMOL/L (ref 16–60)
ANION GAP SERPL CALCULATED.3IONS-SCNC: 8.8 MMOL/L (ref 5–15)
AORTIC DIMENSIONLESS INDEX: 0.76 (DI)
AST SERPL-CCNC: 34 U/L (ref 1–40)
AV MEAN PRESS GRAD SYS DOP V1V2: 5 MMHG
AV VMAX SYS DOP: 146 CM/SEC
BASOPHILS # BLD AUTO: 0.06 10*3/MM3 (ref 0–0.2)
BASOPHILS NFR BLD AUTO: 0.7 % (ref 0–1.5)
BH CV ECHO LEFT VENTRICLE GLOBAL LONGITUDINAL STRAIN: -15.5 %
BH CV ECHO MEAS - AO MAX PG: 8.5 MMHG
BH CV ECHO MEAS - AO V2 VTI: 30.3 CM
BH CV ECHO MEAS - AVA(I,D): 3.1 CM2
BH CV ECHO MEAS - EDV(CUBED): 195.1 ML
BH CV ECHO MEAS - EDV(MOD-SP4): 175 ML
BH CV ECHO MEAS - EF(MOD-SP4): 49.8 %
BH CV ECHO MEAS - ESV(CUBED): 74.1 ML
BH CV ECHO MEAS - ESV(MOD-SP4): 87.8 ML
BH CV ECHO MEAS - FS: 27.6 %
BH CV ECHO MEAS - IVS/LVPW: 1 CM
BH CV ECHO MEAS - IVSD: 1.1 CM
BH CV ECHO MEAS - LA DIMENSION: 3.7 CM
BH CV ECHO MEAS - LAT PEAK E' VEL: 10.8 CM/SEC
BH CV ECHO MEAS - LV DIASTOLIC VOL/BSA (35-75): 64.9 CM2
BH CV ECHO MEAS - LV MASS(C)D: 264.3 GRAMS
BH CV ECHO MEAS - LV MAX PG: 4.9 MMHG
BH CV ECHO MEAS - LV MEAN PG: 2 MMHG
BH CV ECHO MEAS - LV SYSTOLIC VOL/BSA (12-30): 32.6 CM2
BH CV ECHO MEAS - LV V1 MAX: 111 CM/SEC
BH CV ECHO MEAS - LV V1 VTI: 24.5 CM
BH CV ECHO MEAS - LVIDD: 5.8 CM
BH CV ECHO MEAS - LVIDS: 4.2 CM
BH CV ECHO MEAS - LVOT AREA: 3.8 CM2
BH CV ECHO MEAS - LVOT DIAM: 2.2 CM
BH CV ECHO MEAS - LVPWD: 1.1 CM
BH CV ECHO MEAS - MED PEAK E' VEL: 6.4 CM/SEC
BH CV ECHO MEAS - MV A DUR: 0.14 SEC
BH CV ECHO MEAS - MV A MAX VEL: 70.3 CM/SEC
BH CV ECHO MEAS - MV DEC SLOPE: 345 CM/SEC2
BH CV ECHO MEAS - MV DEC TIME: 0.26 SEC
BH CV ECHO MEAS - MV E MAX VEL: 76.9 CM/SEC
BH CV ECHO MEAS - MV E/A: 1.09
BH CV ECHO MEAS - MV MAX PG: 3.6 MMHG
BH CV ECHO MEAS - MV MEAN PG: 2 MMHG
BH CV ECHO MEAS - MV P1/2T: 82.3 MSEC
BH CV ECHO MEAS - MV V2 VTI: 36.9 CM
BH CV ECHO MEAS - MVA(P1/2T): 2.7 CM2
BH CV ECHO MEAS - MVA(VTI): 2.5 CM2
BH CV ECHO MEAS - PA ACC TIME: 0.12 SEC
BH CV ECHO MEAS - PA V2 MAX: 97 CM/SEC
BH CV ECHO MEAS - RV MAX PG: 3.2 MMHG
BH CV ECHO MEAS - RV V1 MAX: 88.9 CM/SEC
BH CV ECHO MEAS - RV V1 VTI: 18.9 CM
BH CV ECHO MEAS - SV(LVOT): 93.1 ML
BH CV ECHO MEAS - SV(MOD-SP4): 87.2 ML
BH CV ECHO MEAS - SVI(LVOT): 34.6 ML/M2
BH CV ECHO MEAS - SVI(MOD-SP4): 32.4 ML/M2
BH CV ECHO MEASUREMENTS AVERAGE E/E' RATIO: 8.94
BH CV ECHO SHUNT ASSESSMENT PERFORMED (HIDDEN SCRIPTING): 1
BILIRUB SERPL-MCNC: 0.9 MG/DL (ref 0–1.2)
BUN SERPL-MCNC: 15 MG/DL (ref 6–20)
BUN/CREAT SERPL: 12.6 (ref 7–25)
CALCIUM SPEC-SCNC: 9.1 MG/DL (ref 8.6–10.5)
CHLORIDE SERPL-SCNC: 102 MMOL/L (ref 98–107)
CHOLEST SERPL-MCNC: 114 MG/DL (ref 0–200)
CO2 SERPL-SCNC: 27.2 MMOL/L (ref 22–29)
CREAT SERPL-MCNC: 1.19 MG/DL (ref 0.76–1.27)
DEPRECATED RDW RBC AUTO: 47 FL (ref 37–54)
EGFRCR SERPLBLD CKD-EPI 2021: 75.8 ML/MIN/1.73
EOSINOPHIL # BLD AUTO: 0.27 10*3/MM3 (ref 0–0.4)
EOSINOPHIL NFR BLD AUTO: 3 % (ref 0.3–6.2)
ERYTHROCYTE [DISTWIDTH] IN BLOOD BY AUTOMATED COUNT: 13.6 % (ref 12.3–15.4)
ERYTHROCYTE [SEDIMENTATION RATE] IN BLOOD: 42 MM/HR (ref 0–15)
FOLATE SERPL-MCNC: 14.6 NG/ML (ref 4.78–24.2)
GLOBULIN UR ELPH-MCNC: 3.6 GM/DL
GLUCOSE BLDC GLUCOMTR-MCNC: 130 MG/DL (ref 70–105)
GLUCOSE BLDC GLUCOMTR-MCNC: 130 MG/DL (ref 70–105)
GLUCOSE BLDC GLUCOMTR-MCNC: 96 MG/DL (ref 70–105)
GLUCOSE SERPL-MCNC: 118 MG/DL (ref 65–99)
HBA1C MFR BLD: 6.64 % (ref 4.8–5.6)
HCT VFR BLD AUTO: 42.3 % (ref 37.5–51)
HDLC SERPL-MCNC: 43 MG/DL (ref 40–60)
HGB BLD-MCNC: 13.2 G/DL (ref 13–17.7)
IMM GRANULOCYTES # BLD AUTO: 0.03 10*3/MM3 (ref 0–0.05)
IMM GRANULOCYTES NFR BLD AUTO: 0.3 % (ref 0–0.5)
LDLC SERPL CALC-MCNC: 58 MG/DL (ref 0–100)
LDLC/HDLC SERPL: 1.37 {RATIO}
LEFT ATRIUM VOLUME INDEX: 13.3 ML/M2
LV EF 3D SEGMENTATION: 54 %
LV EF BIPLANE MOD: 51 %
LYMPHOCYTES # BLD AUTO: 1.54 10*3/MM3 (ref 0.7–3.1)
LYMPHOCYTES NFR BLD AUTO: 17 % (ref 19.6–45.3)
MCH RBC QN AUTO: 29.3 PG (ref 26.6–33)
MCHC RBC AUTO-ENTMCNC: 31.2 G/DL (ref 31.5–35.7)
MCV RBC AUTO: 93.8 FL (ref 79–97)
MONOCYTES # BLD AUTO: 0.7 10*3/MM3 (ref 0.1–0.9)
MONOCYTES NFR BLD AUTO: 7.7 % (ref 5–12)
NEUTROPHILS NFR BLD AUTO: 6.46 10*3/MM3 (ref 1.7–7)
NEUTROPHILS NFR BLD AUTO: 71.3 % (ref 42.7–76)
NRBC BLD AUTO-RTO: 0 /100 WBC (ref 0–0.2)
PLATELET # BLD AUTO: 617 10*3/MM3 (ref 140–450)
PMV BLD AUTO: 9.4 FL (ref 6–12)
POTASSIUM SERPL-SCNC: 4.1 MMOL/L (ref 3.5–5.2)
PROT SERPL-MCNC: 8 G/DL (ref 6–8.5)
QT INTERVAL: 472 MS
QTC INTERVAL: 517 MS
RBC # BLD AUTO: 4.51 10*6/MM3 (ref 4.14–5.8)
SINUS: 3.5 CM
SODIUM SERPL-SCNC: 138 MMOL/L (ref 136–145)
STJ: 3.2 CM
T4 FREE SERPL-MCNC: 1.02 NG/DL (ref 0.93–1.7)
TRIGL SERPL-MCNC: 60 MG/DL (ref 0–150)
TSH SERPL DL<=0.05 MIU/L-ACNC: 0.84 UIU/ML (ref 0.27–4.2)
VIT B12 BLD-MCNC: 393 PG/ML (ref 211–946)
VLDLC SERPL-MCNC: 13 MG/DL (ref 5–40)
WBC NRBC COR # BLD AUTO: 9.06 10*3/MM3 (ref 3.4–10.8)

## 2025-01-03 PROCEDURE — G0108 DIAB MANAGE TRN  PER INDIV: HCPCS

## 2025-01-03 PROCEDURE — 99214 OFFICE O/P EST MOD 30 MIN: CPT | Performed by: PSYCHIATRY & NEUROLOGY

## 2025-01-03 PROCEDURE — 82746 ASSAY OF FOLIC ACID SERUM: CPT

## 2025-01-03 PROCEDURE — 84439 ASSAY OF FREE THYROXINE: CPT | Performed by: PSYCHIATRY & NEUROLOGY

## 2025-01-03 PROCEDURE — 82948 REAGENT STRIP/BLOOD GLUCOSE: CPT

## 2025-01-03 PROCEDURE — 97162 PT EVAL MOD COMPLEX 30 MIN: CPT

## 2025-01-03 PROCEDURE — 82607 VITAMIN B-12: CPT

## 2025-01-03 PROCEDURE — 70551 MRI BRAIN STEM W/O DYE: CPT

## 2025-01-03 PROCEDURE — 93356 MYOCRD STRAIN IMG SPCKL TRCK: CPT

## 2025-01-03 PROCEDURE — 83036 HEMOGLOBIN GLYCOSYLATED A1C: CPT

## 2025-01-03 PROCEDURE — 93306 TTE W/DOPPLER COMPLETE: CPT

## 2025-01-03 PROCEDURE — G0378 HOSPITAL OBSERVATION PER HR: HCPCS

## 2025-01-03 PROCEDURE — 93356 MYOCRD STRAIN IMG SPCKL TRCK: CPT | Performed by: INTERNAL MEDICINE

## 2025-01-03 PROCEDURE — 93306 TTE W/DOPPLER COMPLETE: CPT | Performed by: INTERNAL MEDICINE

## 2025-01-03 PROCEDURE — 97165 OT EVAL LOW COMPLEX 30 MIN: CPT

## 2025-01-03 PROCEDURE — 82140 ASSAY OF AMMONIA: CPT | Performed by: PSYCHIATRY & NEUROLOGY

## 2025-01-03 PROCEDURE — 80050 GENERAL HEALTH PANEL: CPT

## 2025-01-03 PROCEDURE — 85652 RBC SED RATE AUTOMATED: CPT

## 2025-01-03 PROCEDURE — 80061 LIPID PANEL: CPT

## 2025-01-03 RX ORDER — INSULIN LISPRO 100 [IU]/ML
2-7 INJECTION, SOLUTION INTRAVENOUS; SUBCUTANEOUS
Status: DISCONTINUED | OUTPATIENT
Start: 2025-01-03 | End: 2025-01-03 | Stop reason: HOSPADM

## 2025-01-03 RX ORDER — POLYETHYLENE GLYCOL 3350 17 G/17G
17 POWDER, FOR SOLUTION ORAL DAILY PRN
Status: DISCONTINUED | OUTPATIENT
Start: 2025-01-03 | End: 2025-01-03 | Stop reason: HOSPADM

## 2025-01-03 RX ORDER — SODIUM CHLORIDE 0.9 % (FLUSH) 0.9 %
10 SYRINGE (ML) INJECTION AS NEEDED
Status: DISCONTINUED | OUTPATIENT
Start: 2025-01-03 | End: 2025-01-03 | Stop reason: HOSPADM

## 2025-01-03 RX ORDER — DEXTROSE MONOHYDRATE 25 G/50ML
25 INJECTION, SOLUTION INTRAVENOUS
Status: DISCONTINUED | OUTPATIENT
Start: 2025-01-03 | End: 2025-01-03 | Stop reason: HOSPADM

## 2025-01-03 RX ORDER — SODIUM CHLORIDE 9 MG/ML
40 INJECTION, SOLUTION INTRAVENOUS AS NEEDED
Status: DISCONTINUED | OUTPATIENT
Start: 2025-01-03 | End: 2025-01-03 | Stop reason: SDUPTHER

## 2025-01-03 RX ORDER — BUMETANIDE 1 MG/1
1 TABLET ORAL 2 TIMES DAILY
Status: DISCONTINUED | OUTPATIENT
Start: 2025-01-03 | End: 2025-01-03 | Stop reason: HOSPADM

## 2025-01-03 RX ORDER — CLOPIDOGREL BISULFATE 75 MG/1
75 TABLET ORAL DAILY
Qty: 30 TABLET | Refills: 5 | Status: SHIPPED | OUTPATIENT
Start: 2025-01-04

## 2025-01-03 RX ORDER — LISINOPRIL 5 MG/1
10 TABLET ORAL
Status: DISCONTINUED | OUTPATIENT
Start: 2025-01-03 | End: 2025-01-03 | Stop reason: HOSPADM

## 2025-01-03 RX ORDER — FAMOTIDINE 20 MG/1
40 TABLET, FILM COATED ORAL DAILY
Status: DISCONTINUED | OUTPATIENT
Start: 2025-01-03 | End: 2025-01-03 | Stop reason: HOSPADM

## 2025-01-03 RX ORDER — AMOXICILLIN 250 MG
2 CAPSULE ORAL 2 TIMES DAILY PRN
Status: DISCONTINUED | OUTPATIENT
Start: 2025-01-03 | End: 2025-01-03 | Stop reason: HOSPADM

## 2025-01-03 RX ORDER — NICOTINE POLACRILEX 4 MG
15 LOZENGE BUCCAL
Status: DISCONTINUED | OUTPATIENT
Start: 2025-01-03 | End: 2025-01-03 | Stop reason: HOSPADM

## 2025-01-03 RX ORDER — NITROGLYCERIN 0.4 MG/1
0.4 TABLET SUBLINGUAL
Status: DISCONTINUED | OUTPATIENT
Start: 2025-01-03 | End: 2025-01-03 | Stop reason: HOSPADM

## 2025-01-03 RX ORDER — FLUTICASONE PROPIONATE 50 MCG
2 SPRAY, SUSPENSION (ML) NASAL DAILY PRN
Start: 2025-01-03

## 2025-01-03 RX ORDER — CARVEDILOL 6.25 MG/1
12.5 TABLET ORAL 2 TIMES DAILY WITH MEALS
Status: DISCONTINUED | OUTPATIENT
Start: 2025-01-03 | End: 2025-01-03 | Stop reason: HOSPADM

## 2025-01-03 RX ORDER — BISACODYL 5 MG/1
5 TABLET, DELAYED RELEASE ORAL DAILY PRN
Status: DISCONTINUED | OUTPATIENT
Start: 2025-01-03 | End: 2025-01-03 | Stop reason: HOSPADM

## 2025-01-03 RX ORDER — BISACODYL 10 MG
10 SUPPOSITORY, RECTAL RECTAL DAILY PRN
Status: DISCONTINUED | OUTPATIENT
Start: 2025-01-03 | End: 2025-01-03 | Stop reason: SDUPTHER

## 2025-01-03 RX ORDER — SODIUM CHLORIDE 0.9 % (FLUSH) 0.9 %
10 SYRINGE (ML) INJECTION EVERY 12 HOURS SCHEDULED
Status: DISCONTINUED | OUTPATIENT
Start: 2025-01-03 | End: 2025-01-03 | Stop reason: HOSPADM

## 2025-01-03 RX ORDER — ATORVASTATIN CALCIUM 80 MG/1
80 TABLET, FILM COATED ORAL NIGHTLY
Qty: 90 TABLET | Refills: 1 | Status: SHIPPED | OUTPATIENT
Start: 2025-01-03

## 2025-01-03 RX ORDER — ACETAMINOPHEN 325 MG/1
650 TABLET ORAL EVERY 4 HOURS PRN
Start: 2025-01-03

## 2025-01-03 RX ORDER — IBUPROFEN 600 MG/1
1 TABLET ORAL
Status: DISCONTINUED | OUTPATIENT
Start: 2025-01-03 | End: 2025-01-03 | Stop reason: HOSPADM

## 2025-01-03 RX ADMIN — Medication 10 ML: at 09:25

## 2025-01-03 RX ADMIN — ASPIRIN 81 MG CHEWABLE TABLET 81 MG: 81 TABLET CHEWABLE at 09:24

## 2025-01-03 RX ADMIN — BUMETANIDE 1 MG: 1 TABLET ORAL at 09:24

## 2025-01-03 RX ADMIN — FAMOTIDINE 40 MG: 20 TABLET, FILM COATED ORAL at 09:24

## 2025-01-03 RX ADMIN — CLOPIDOGREL BISULFATE 75 MG: 75 TABLET ORAL at 09:24

## 2025-01-03 RX ADMIN — CLOPIDOGREL BISULFATE 300 MG: 75 TABLET ORAL at 03:12

## 2025-01-03 RX ADMIN — Medication 10 ML: at 03:12

## 2025-01-03 RX ADMIN — LISINOPRIL 10 MG: 5 TABLET ORAL at 09:22

## 2025-01-03 RX ADMIN — CARVEDILOL 12.5 MG: 6.25 TABLET, FILM COATED ORAL at 09:22

## 2025-01-03 NOTE — PLAN OF CARE
Goal Outcome Evaluation:  Plan of Care Reviewed With: patient           Outcome Evaluation: Misael Carney is a 49 y.o. male with hx of CHF, CAD with ICD in place who presents with mental fogginess, difficulty speaking and ambulating. CT head, cerebral profusion and CTA of head and neck were negative.  Pt found with elevated platelet count and SED rate.  He is treated for strokelike symptoms, nonischemic dilated cardiomyopathy, and pulmonary HTN.   At baseline, pt lives alone in a 1st floor apt. He is independent for all moblity and IADLs including driving and works here as a .  At time of PT evaluation, he is A&O x 4 with some word finding difficulty.  He reports feeling close to his baseline except that his head continues to feel 'fuzzy' and describes feeling like he is in 'safe mode on a computer'.  He exhibits good ROM and strength throughout and ambulates well without AD or physical assist.  He complete finger to nose and heel to shin accurately.  He may benefit from OP Speech to address cognitive processing and word finding barriers. We will continue to work with him while here but do not anticipate ongoing PT needs at discharge.    Anticipated Discharge Disposition (PT): home

## 2025-01-03 NOTE — PLAN OF CARE
"Goal Outcome Evaluation:   Speech therapy following for possible speech/language evaluation. Orders generated as per stroke workup. The patient passed the stroke swallow screen and is currently receiving a regular consistency/thin liquid, cardiac, healthy heart diet. CT of the head negative. Patient has an NIH of 1 only for sensory issues. Alert and oriented. Neurology noted \"pressured\" speech at time of admission but no aphasia. Patient to possibly have MRI pending clearance due to presences of pacemaker. ST will await results of MRI and proceed with speech/language assessment if positive for CVA. Thank you.                                          "

## 2025-01-03 NOTE — CONSULTS
"Diabetes Education  Assessment/Teaching    Patient Name:  Misael Carney  YOB: 1975  MRN: [id number]  Admit Date:  1/2/2025      Assessment Date:  1/3/2025  Flowsheet Row Most Recent Value   General Information     Referral From: A1c, Blood glucose, MD order  [MD consult for newly diagnosed and on 1/3/2025 A1c was 6.64%.]   Height 188 cm (74\")   Height Method Stated   Weight 151 kg (332 lb 14.3 oz)   Weight Method Standing scale   Pregnancy Assessment    Diabetes History    What type of diabetes do you have? Type 2   Length of Diabetes Diagnosis Newly diagnosed <6 months  [Diagnosed this admission.]   Current DM knowledge poor   Have you had diabetes education/teaching in the past? no   Do you test your blood sugar at home? yes   Frequency of checks once a week   Meter type ReliOn 2 years old   Who performs the test? patient   Typical readings 120   Feeling down, depressed, or hopeless Not at all   Little interest or pleasure in doing things Not at all   Education Preferences    What areas of diabetes would you like to learn about? avoiding high blood sugar, diabetes complications, diet information, exercise information, understanding diabetes, testing my blood sugar at home   Nutrition Information    Assessment Topics    Healthy Eating - Assessment Needs education   Being Active - Assessment Needs education   Problem Solving - Assessment Needs education   Reducing Risk - Assessment Needs education   Monitoring - Assessment Needs education   DM Goals    Healthy Eating - Goal Today   Being Active - Goal Today   Problem Solving - Goal Today   Reducing Risk - Goal Today   Monitoring - Goal Today            Flowsheet Row Most Recent Value   DM Education Needs    Meter Has own   Meter Type Other (comment)  [ReliOn]   Frequency of Testing Daily  [Discussed with patient that it is recommended to check blood sugar daily varying the times before meals and at bedtime.]   Problem Solving Hyperglycemia, Signs, " Symptoms, Treatment   Reducing Risks A1C testing, Lipids, Blood pressure, Eye exam, Dental exam, Foot care, Immunizations, Cardiovascular, Neuropathy, Retinopathy  [On 1/3/2025 A1c was 6.64%.]   Healthy Eating Basic meal plan provided   Discharge Plan Home   Motivation Moderate   Teaching Method Discussion, Handouts, Explanation   Patient Response Verbalized understanding              Other Comments:  A1c info sheet given with discussion on being newly diagnosed with diabetes, A1c target, and healthy blood sugar range. Patient stated he has been in pre-diabetes range for a while. First Steps book to managing diabetes given to patient with discussion on diabetes and diagnosis. Discussed how diabetes puts you at higher risk for heart attack, stroke, kidney failure, blindness, and neuropathy.  Also discussed the importance of yearly eye exams, dental exams, regular follow-up with PCP to check blood pressure, lipids, kidney function, liver function, and to keep up-to-date on vaccinations. Foot care discussed with checking feet daily and wearing shoes when out of bed. Handouts given with discussion on 1 serving of carbs being = 15 grams, being allowed 4 servings  of carbs per meal, counting carbs, reading food labels, and meal planning. Patient stated that he drinks zero sugar soda (one a day), water, and 8 ounces of juice daily. Explained how sugared beverages affect the blood sugar and that it is recommended to drink unsweetened beverages. Discussed with patient the importance of washing hands prior to checking blood sugar and to prick sides of fingers where less sensitive. Patient stated he has a Liv[employer] blood pressure cuff at home and is going to contact insurance about getting a meter and supplies. Explained to patient that it is recommended to get 150 minutes of exercise a week minimally alternating days. Patient has no further questions or concerns related to diabetes at this time.        Electronically signed  by:  Holli Caceres RN  01/03/25 16:58 EST

## 2025-01-03 NOTE — H&P
Patient Care Team:  Jarod Zuleta PA-C as PCP - General (Physician Assistant)  Ridge Lindsay MD as Cardiologist (Cardiology)  Marci Cerda MD as Consulting Physician (Cardiology)    Chief complaint difficulty speaking and thinking clearly, difficult ambulation    Subjective     Patient is a 49 y.o. male who works as a  at this facility, who presents with concerns of mental fogginess, difficulty speaking and difficult with ambulation.  He reports he felt fine when he went to bed at 3pm. He drove himself to work, but decided to get checked out.  He reports that he lives alone.  He denies pain, double/blurry vision, numbness/tingling.  He reports that his brain feels foggy.  He is currently still having difficulty verbalizing his thoughts at the time of exam.    In the ER, a code stroke was initiated.  He was evaluated by the neurologist and was not a TNK candidate due to last known well of 3pm.  CMP unremarkable except for a glucose of 118. Cbc normal except for platelet of 617.  Sed rate elevated at 42. CXR was negative.  CT head, cerebral perfusion, CTA head and neck were negative.  EKG showed a biventricular paced rhythm.      Review of Systems   Constitutional:  Negative for fever.   HENT:  Negative for congestion.    Eyes:  Negative for visual disturbance.   Respiratory:  Negative for chest tightness and shortness of breath.    Cardiovascular:  Negative for chest pain and palpitations.   Gastrointestinal:  Negative for abdominal pain.   Neurological:  Positive for speech difficulty. Negative for dizziness, facial asymmetry, weakness, light-headedness and numbness.          History  Past Medical History:   Diagnosis Date    Abnormal ECG 9/19/2022    CHF (congestive heart failure)     Congenital heart disease 9/19/2022    Coronary artery disease     Diabetes mellitus     LBBB (left bundle branch block) 04/04/2023    Other hyperlipidemia 09/19/2022    Peptic ulcer disease      Past  Surgical History:   Procedure Laterality Date    ANKLE SURGERY Left 2016    CARDIAC CATHETERIZATION Bilateral 09/21/2022    Procedure: Right and Left Heart Cath;  Surgeon: Ridge Lindsay MD;  Location:  [facility];  Service: Cardiovascular;  Laterality: Bilateral;    CARDIAC DEFIBRILLATOR PLACEMENT  03/02/2023    CARDIAC ELECTROPHYSIOLOGY PROCEDURE N/A 03/02/2023    Procedure: BivICD Abbott aware;  Surgeon: Marci Cerda MD;  Location:  [facility];  Service: Cardiovascular;  Laterality: N/A;     Family History   Problem Relation Age of Onset    Asthma Mother     Heart attack Father     Heart disease Father     Heart failure Father     Hypertension Father      Social History     Tobacco Use    Smoking status: Never    Smokeless tobacco: Never   Vaping Use    Vaping status: Never Used   Substance Use Topics    Alcohol use: Not Currently     Comment: occ    Drug use: Never     Medications Prior to Admission   Medication Sig Dispense Refill Last Dose/Taking    aspirin 81 MG EC tablet Take 1 tablet by mouth Daily. 30 tablet 0 1/2/2025    atorvastatin (LIPITOR) 40 MG tablet Take 1 tablet by mouth Every Night. 90 tablet 3 1/2/2025    bumetanide (BUMEX) 1 MG tablet Take 1 tablet by mouth 2 (Two) Times a Day. 180 tablet 3 1/2/2025    carvedilol (COREG) 12.5 MG tablet Take 1 tablet by mouth 2 (Two) Times a Day With Meals. 180 tablet 3 1/2/2025    lisinopril (PRINIVIL,ZESTRIL) 10 MG tablet Take 1 tablet by mouth Daily. 90 tablet 3 1/2/2025    fluticasone (Flonase) 50 MCG/ACT nasal spray instill 2 sprays into the nostril(s) daily (Patient taking differently: Administer 2 sprays into the nostril(s) as directed by provider Daily As Needed.) 16 g 0 Unknown     Allergies:  Nsaids    Objective     Vital Signs  Temp:  [97.7 °F (36.5 °C)-98 °F (36.7 °C)] 97.7 °F (36.5 °C)  Heart Rate:  [60-78] 61  Resp:  [17-22] 17  BP: (114-152)/(70-89) 118/82     Physical Exam:      General Appearance:     Alert, cooperative, in no acute distress   Head:    Normocephalic, without obvious abnormality, atraumatic   Eyes:            Lids and lashes normal, conjunctivae and sclerae normal, no   icterus, no pallor, corneas clear, PERRLA   Ears:    Ears appear intact with no abnormalities noted   Throat:   No oral lesions, no thrush, oral mucosa moist   Neck:   No adenopathy, supple, trachea midline, no thyromegaly, no   carotid bruit, no JVD   Lungs:     Clear to auscultation,respirations regular, even and                  unlabored    Heart:    Regular rhythm and normal rate, normal S1 and S2, no            murmur, no gallop, no rub, no click   Chest Wall:    No abnormalities observed   Abdomen:     Normal bowel sounds, no masses, no organomegaly, soft        non-tender, non-distended, no guarding, no rebound                tenderness   Extremities:   Moves all extremities well, no edema, no cyanosis, no             redness   Pulses:   Pulses palpable and equal bilaterally   Skin:   No bleeding, bruising or rash   Lymph nodes:   No palpable adenopathy   Neurologic:   Cranial nerves 2 - 12 grossly intact, sensation intact, DTR       present and equal bilaterally; difficulty finding words for his thoughts. Speech clear.       Results Review:     Imaging Results (Last 24 Hours)       Procedure Component Value Units Date/Time    CT Angiogram Head w AI Analysis of LVO [[id number]] Collected: 01/02/25 2321     Updated: 01/02/25 2328    Narrative:      CT ANGIOGRAM HEAD W AI ANALYSIS OF LVO, CT ANGIOGRAM NECK    Date of Exam: 1/2/2025 10:48 PM EST    Indication: Stroke, follow up  Neuro deficit, acute, stroke suspected  Acute Stroke.    Comparison: None available.    Technique: CTA of the head and neck was performed after the uneventful intravenous administration of iodinated contrast. Reconstructed coronal and sagittal images were also obtained. In addition, a 3-D volume rendered image was created for   interpretation. Automated  exposure control and iterative reconstruction methods were used.      Findings:  The lung apices are clear. Evaluation of the neck soft tissues demonstrates no pathologic cervical adenopathy or unexpected aerodigestive tract mass. The osseous structures demonstrate no evidence of acute fracture or aggressive osseous lesion. Patent   aortic arch with typical three-vessel branching. The visualized subclavian arteries are patent bilaterally. There is no significant atherosclerotic narrowing of the ICA origins, 0% stenosis present by NASCET criteria bilaterally. The vertebral arteries   are normal in course and caliber bilaterally. Intracranially, the carotid siphons are patent. The anterior cerebral arteries are normal in course and caliber. The right and left middle cerebral arteries demonstrate no evidence of flow-limiting stenosis,   large vessel occlusion or aneurysm. The vertebral basilar system is patent. The posterior cerebral arteries are normal in course and caliber bilaterally.      Impression:      Impression:  Normal CT angiogram of the head and neck. There is no evidence of flow-limiting stenosis, large vessel occlusion or aneurysm.        Electronically Signed: Jalil Rodriguez MD    1/2/2025 11:26 PM EST    Workstation ID: [id number]    CT Angiogram Neck [[id number]] Collected: 01/02/25 2321     Updated: 01/02/25 2328    Narrative:      CT ANGIOGRAM HEAD W AI ANALYSIS OF LVO, CT ANGIOGRAM NECK    Date of Exam: 1/2/2025 10:48 PM EST    Indication: Stroke, follow up  Neuro deficit, acute, stroke suspected  Acute Stroke.    Comparison: None available.    Technique: CTA of the head and neck was performed after the uneventful intravenous administration of iodinated contrast. Reconstructed coronal and sagittal images were also obtained. In addition, a 3-D volume rendered image was created for   interpretation. Automated exposure control and iterative reconstruction methods were used.      Findings:  The lung apices  are clear. Evaluation of the neck soft tissues demonstrates no pathologic cervical adenopathy or unexpected aerodigestive tract mass. The osseous structures demonstrate no evidence of acute fracture or aggressive osseous lesion. Patent   aortic arch with typical three-vessel branching. The visualized subclavian arteries are patent bilaterally. There is no significant atherosclerotic narrowing of the ICA origins, 0% stenosis present by NASCET criteria bilaterally. The vertebral arteries   are normal in course and caliber bilaterally. Intracranially, the carotid siphons are patent. The anterior cerebral arteries are normal in course and caliber. The right and left middle cerebral arteries demonstrate no evidence of flow-limiting stenosis,   large vessel occlusion or aneurysm. The vertebral basilar system is patent. The posterior cerebral arteries are normal in course and caliber bilaterally.      Impression:      Impression:  Normal CT angiogram of the head and neck. There is no evidence of flow-limiting stenosis, large vessel occlusion or aneurysm.        Electronically Signed: Jalil Rodriguez MD    1/2/2025 11:26 PM EST    Workstation ID: [id number]    XR Chest 1 View [[id number]] Collected: 01/02/25 2314     Updated: 01/02/25 2317    Narrative:      XR CHEST 1 VW    Date of Exam: 1/2/2025 11:03 PM EST    Indication: Acute Stroke Protocol (onset < 12 hrs)    Comparison: 12/27/2023.    Findings:  There is a left subclavian AICD/pacemaker device. There are no airspace consolidations. No pleural fluid. No pneumothorax. The pulmonary vasculature appears within normal limits. The cardiac and mediastinal silhouette appear unremarkable. No acute   osseous abnormality identified.      Impression:      Impression:  No acute cardiopulmonary process.      Electronically Signed: Kay De Leon MD    1/2/2025 11:15 PM EST    Workstation ID: [id number]    CT CEREBRAL PERFUSION WITH & WITHOUT CONTRAST [[id number]] Collected: 01/02/25 7254      Updated: 01/02/25 2252    Narrative:      CT CEREBRAL PERFUSION W WO CONTRAST    Date of Exam: 1/2/2025 10:39 PM EST    Indication: Neuro deficit, acute, stroke suspected  Neuro deficit, acute, stroke suspected.     Comparison: None available.    Technique: Axial CT images of the brain were obtained prior to and after the administration of iodinated contrast. CT Perfusion protocol was utilized. Automated post processing was performed by RAPID software and submitted to PACS for interpretation.   Automated exposure control and iterative reconstruction was utilized.      Findings:  Color maps are symmetric, without evidence of core infarct or significant territorial ischemic tissue at risk.      Impression:      Impression:  No evidence of core infarct or territorial ischemic tissue at risk.        Electronically Signed: Jalil Rodriguez MD    1/2/2025 10:50 PM EST    Workstation ID: [id number]    CT Head Without Contrast Stroke Protocol [[id number]] Collected: 01/02/25 2238     Updated: 01/02/25 2246    Narrative:      CT HEAD WO CONTRAST STROKE PROTOCOL    Date of Exam: 1/2/2025 10:31 PM EST    Indication: Neuro deficit, acute, stroke suspected  Neuro deficit, acute, stroke suspected.    Comparison: None available.    Technique: Axial CT images were obtained of the head without contrast administration.  Reconstructed coronal and sagittal images were also obtained. Automated exposure control and iterative construction methods were used.    Scan Time: 22:29  Results discussed with Dr. Andrew at 22:40.      Findings:  No acute intracranial hemorrhage or extra-axial collection is identified. The ventricles appear normal in caliber, with no evidence of mass effect or midline shift. The basal cisterns appear patent. The gray-white differentiation appears preserved.    The calvarium appear intact. The paranasal sinuses are clear. The mastoid air cells are well-aerated.      Impression:      Impression:  No acute  intracranial process identified.        Electronically Signed: Brigido Leon MD    1/2/2025 10:42 PM EST    Workstation ID: [id number]             Lab Results (last 24 hours)       Procedure Component Value Units Date/Time    Comprehensive Metabolic Panel [[id number]]  (Abnormal) Collected: 01/03/25 0322    Specimen: Blood from Arm, Left Updated: 01/03/25 0407     Glucose 118 mg/dL      BUN 15 mg/dL      Creatinine 1.19 mg/dL      Sodium 138 mmol/L      Potassium 4.1 mmol/L      Comment: Slight hemolysis detected by analyzer. Result may be falsely elevated.        Chloride 102 mmol/L      CO2 27.2 mmol/L      Calcium 9.1 mg/dL      Total Protein 8.0 g/dL      Albumin 4.4 g/dL      ALT (SGPT) 25 U/L      AST (SGOT) 34 U/L      Alkaline Phosphatase 90 U/L      Total Bilirubin 0.9 mg/dL      Globulin 3.6 gm/dL      A/G Ratio 1.2 g/dL      BUN/Creatinine Ratio 12.6     Anion Gap 8.8 mmol/L      eGFR 75.8 mL/min/1.73     Narrative:      GFR Categories in Chronic Kidney Disease (CKD)      GFR Category          GFR (mL/min/1.73)    Interpretation  G1                     90 or greater         Normal or high (1)  G2                      60-89                Mild decrease (1)  G3a                   45-59                Mild to moderate decrease  G3b                   30-44                Moderate to severe decrease  G4                    15-29                Severe decrease  G5                    14 or less           Kidney failure          (1)In the absence of evidence of kidney disease, neither GFR category G1 or G2 fulfill the criteria for CKD.    eGFR calculation 2021 CKD-EPI creatinine equation, which does not include race as a factor    Lipid Panel [[id number]] Collected: 01/03/25 0322    Specimen: Blood from Arm, Left Updated: 01/03/25 0406     Total Cholesterol 114 mg/dL      Triglycerides 60 mg/dL      HDL Cholesterol 43 mg/dL      LDL Cholesterol  58 mg/dL      VLDL Cholesterol 13 mg/dL      LDL/HDL Ratio 1.37     Narrative:      Cholesterol Reference Ranges  (U.S. Department of Health and Human Services ATP III Classifications)    Desirable          <200 mg/dL  Borderline High    200-239 mg/dL  High Risk          >240 mg/dL      Triglyceride Reference Ranges  (U.S. Department of Health and Human Services ATP III Classifications)    Normal           <150 mg/dL  Borderline High  150-199 mg/dL  High             200-499 mg/dL  Very High        >500 mg/dL    HDL Reference Ranges  (U.S. Department of Health and Human Services ATP III Classifications)    Low     <40 mg/dl (major risk factor for CHD)  High    >60 mg/dl ('negative' risk factor for CHD)        LDL Reference Ranges  (U.S. Department of Health and Human Services ATP III Classifications)    Optimal          <100 mg/dL  Near Optimal     100-129 mg/dL  Borderline High  130-159 mg/dL  High             160-189 mg/dL  Very High        >189 mg/dL    TSH [[id number]]  (Normal) Collected: 01/03/25 0322    Specimen: Blood from Arm, Left Updated: 01/03/25 0406     TSH 0.836 uIU/mL     Hemoglobin A1c [[id number]]  (Abnormal) Collected: 01/03/25 0322    Specimen: Blood from Arm, Left Updated: 01/03/25 0358     Hemoglobin A1C 6.64 %     Sedimentation Rate [[id number]]  (Abnormal) Collected: 01/03/25 0322    Specimen: Blood from Arm, Left Updated: 01/03/25 0344     Sed Rate 42 mm/hr     CBC & Differential [[id number]]  (Abnormal) Collected: 01/03/25 0322    Specimen: Blood from Arm, Left Updated: 01/03/25 0337    Narrative:      The following orders were created for panel order CBC & Differential.  Procedure                               Abnormality         Status                     ---------                               -----------         ------                     CBC Auto Differential[[id number]]        Abnormal            Final result                 Please view results for these tests on the individual orders.    CBC Auto Differential [[id number]]  (Abnormal) Collected: 01/03/25  0322    Specimen: Blood from Arm, Left Updated: 01/03/25 0337     WBC 9.06 10*3/mm3      RBC 4.51 10*6/mm3      Hemoglobin 13.2 g/dL      Hematocrit 42.3 %      MCV 93.8 fL      MCH 29.3 pg      MCHC 31.2 g/dL      RDW 13.6 %      RDW-SD 47.0 fl      MPV 9.4 fL      Platelets 617 10*3/mm3      Neutrophil % 71.3 %      Lymphocyte % 17.0 %      Monocyte % 7.7 %      Eosinophil % 3.0 %      Basophil % 0.7 %      Immature Grans % 0.3 %      Neutrophils, Absolute 6.46 10*3/mm3      Lymphocytes, Absolute 1.54 10*3/mm3      Monocytes, Absolute 0.70 10*3/mm3      Eosinophils, Absolute 0.27 10*3/mm3      Basophils, Absolute 0.06 10*3/mm3      Immature Grans, Absolute 0.03 10*3/mm3      nRBC 0.0 /100 WBC     Vitamin B12 [[id number]] Collected: 01/03/25 0322    Specimen: Blood from Arm, Left Updated: 01/03/25 0331    Folate [[id number]] Collected: 01/03/25 0322    Specimen: Blood from Arm, Left Updated: 01/03/25 0331    Comprehensive Metabolic Panel [[id number]]  (Abnormal) Collected: 01/02/25 2226    Specimen: Blood Updated: 01/02/25 2305     Glucose 120 mg/dL      BUN 16 mg/dL      Creatinine 1.28 mg/dL      Sodium 139 mmol/L      Potassium 3.8 mmol/L      Chloride 102 mmol/L      CO2 27.3 mmol/L      Calcium 9.1 mg/dL      Total Protein 7.9 g/dL      Albumin 4.2 g/dL      ALT (SGPT) 25 U/L      AST (SGOT) 32 U/L      Alkaline Phosphatase 101 U/L      Total Bilirubin 0.9 mg/dL      Globulin 3.7 gm/dL      A/G Ratio 1.1 g/dL      BUN/Creatinine Ratio 12.5     Anion Gap 9.7 mmol/L      eGFR 69.5 mL/min/1.73     Narrative:      GFR Categories in Chronic Kidney Disease (CKD)      GFR Category          GFR (mL/min/1.73)    Interpretation  G1                     90 or greater         Normal or high (1)  G2                      60-89                Mild decrease (1)  G3a                   45-59                Mild to moderate decrease  G3b                   30-44                Moderate to severe decrease  G4                    15-29                 Severe decrease  G5                    14 or less           Kidney failure          (1)In the absence of evidence of kidney disease, neither GFR category G1 or G2 fulfill the criteria for CKD.    eGFR calculation 2021 CKD-EPI creatinine equation, which does not include race as a factor    Protime-INR [[id number]]  (Abnormal) Collected: 01/02/25 2226    Specimen: Blood Updated: 01/02/25 2258     Protime 14.4 Seconds      INR 1.11    aPTT [[id number]]  (Normal) Collected: 01/02/25 2226    Specimen: Blood Updated: 01/02/25 2258     PTT 28.5 seconds     CBC & Differential [[id number]]  (Abnormal) Collected: 01/02/25 2226    Specimen: Blood Updated: 01/02/25 2233    Narrative:      The following orders were created for panel order CBC & Differential.  Procedure                               Abnormality         Status                     ---------                               -----------         ------                     CBC Auto Differential[[id number]]        Abnormal            Final result                 Please view results for these tests on the individual orders.    CBC Auto Differential [[id number]]  (Abnormal) Collected: 01/02/25 2226    Specimen: Blood Updated: 01/02/25 2233     WBC 9.11 10*3/mm3      RBC 4.41 10*6/mm3      Hemoglobin 13.2 g/dL      Hematocrit 41.9 %      MCV 95.0 fL      MCH 29.9 pg      MCHC 31.5 g/dL      RDW 13.5 %      RDW-SD 47.5 fl      MPV 9.1 fL      Platelets 600 10*3/mm3      Neutrophil % 65.9 %      Lymphocyte % 19.3 %      Monocyte % 10.2 %      Eosinophil % 3.7 %      Basophil % 0.7 %      Immature Grans % 0.2 %      Neutrophils, Absolute 6.00 10*3/mm3      Lymphocytes, Absolute 1.76 10*3/mm3      Monocytes, Absolute 0.93 10*3/mm3      Eosinophils, Absolute 0.34 10*3/mm3      Basophils, Absolute 0.06 10*3/mm3      Immature Grans, Absolute 0.02 10*3/mm3      nRBC 0.0 /100 WBC     [place] Draw [[id number]] Collected: 01/02/25 2226    Specimen: Blood Updated: 01/02/25 2231     Narrative:      The following orders were created for panel order [place] Draw.  Procedure                               Abnormality         Status                     ---------                               -----------         ------                     Green Top (Gel)[[id number]]                                  Final result               Lavender Top[[id number]]                                     Final result               Gold Top - SST[[id number]]                                   Final result               Light Blue Top[[id number]]                                   Final result                 Please view results for these tests on the individual orders.    Green Top (Gel) [[id number]] Collected: 01/02/25 2226    Specimen: Blood Updated: 01/02/25 2231     Extra Tube Hold for add-ons.     Comment: Auto resulted.       Lavender Top [[id number]] Collected: 01/02/25 2226    Specimen: Blood Updated: 01/02/25 2231     Extra Tube hold for add-on     Comment: Auto resulted       Gold Top - SST [[id number]] Collected: 01/02/25 2226    Specimen: Blood Updated: 01/02/25 2231     Extra Tube Hold for add-ons.     Comment: Auto resulted.       Light Blue Top [[id number]] Collected: 01/02/25 2226    Specimen: Blood Updated: 01/02/25 2231     Extra Tube Hold for add-ons.     Comment: Auto resulted                I reviewed the patient's new clinical results.    Assessment & Plan     Acute stroke Evaluation: suspect TIA   -Neuro consult   -echo   -carotid duplex   -PT/OT/ST   -Lipitor increased to 80mg per neuro recommendation.    -will DC home with Plavix 75 daily    Dilated cardiomyopathy, combined systolic and diastolic congestive heart failure   -consulted cardiology.  Pt has defibrillator and need MRI- Need to know if MRI compatible.     -continue home bumex, coreg and lisinopril.    Type 2DM, New   A1c 6.64   -diabetes educator consulted    PPI: Pepcid  VTE: SCD  I discussed the patient's findings and my recommendations  with patient.     Domenica Franklin, RENY  01/03/25  04:47 EST

## 2025-01-03 NOTE — DISCHARGE SUMMARY
"  Date of Discharge:  1/3/2025    Discharge Diagnosis:   **TIA (transient ischemic attack) [G45.9]   Focal neurological deficit [R29.818]   Speech disturbance [R47.9]   Obesity, Class III, BMI 40-49.9 (morbid obesity) [E66.01]   Presence of automatic cardioverter/defibrillator (AICD) [Z95.810]   Pulmonary hypertension [I27.20]   Pre-diabetes [R73.03]       Presenting Problem/History of Present Illness  Active Hospital Problems    Diagnosis  POA    **TIA (transient ischemic attack) [G45.9]  Yes    Focal neurological deficit [R29.818]  Yes    Speech disturbance [R47.9]  Yes    Obesity, Class III, BMI 40-49.9 (morbid obesity) [E66.01]  Yes    Presence of automatic cardioverter/defibrillator (AICD) [Z95.810]  Yes    Pulmonary hypertension [I27.20]  Yes    Pre-diabetes [R73.03]  Yes      Resolved Hospital Problems   No resolved problems to display.          Hospital Course  Patient is a 49 y.o. male with multiple vascular risk factors who presented with episode of word finding difficulties with \"brain fog\" that lasted for several hours.  TIA workup including CT head, CTA of head and neck and MRI did not show any signs of stroke.  He was evaluated by cardiology and neurology and has been treated for TIA.  He will continue aspirin.  Dose of statin has been increased to 80 mg/day.  Plavix has been added.  At the time of discharge she has a normal neurologic exam and is hemodynamically stable.  He will follow-up promptly with PCP.    He has prediabetes with an A1c of 6.6.  This is not a new diagnosis.  He met with the diabetic educator regarding appropriate diet and periodic blood sugar monitoring.      Procedures Performed         Consults:   Consults       Date and Time Order Name Status Description    1/3/2025 12:03 AM Inpatient Cardiology Consult Completed     1/2/2025 11:33 PM Family Medicine Consult      1/2/2025 11:12 PM Inpatient Neurology Consult Stroke Completed     1/2/2025 10:23 PM Inpatient Neurology Consult " Stroke      1/2/2025 10:23 PM Inpatient Neurology Consult Stroke              Pertinent Test Results:    Lab Results (most recent)       Procedure Component Value Units Date/Time    T4, Free [[id number]]  (Normal) Collected: 01/03/25 1230    Specimen: Blood from Arm, Left Updated: 01/03/25 1314     Free T4 1.02 ng/dL     Ammonia [[id number]]  (Normal) Collected: 01/03/25 1230    Specimen: Blood from Arm, Left Updated: 01/03/25 1300     Ammonia 37 umol/L     POC Glucose 4x Daily Before Meals & at Bedtime [[id number]]  (Abnormal) Collected: 01/03/25 1156    Specimen: Blood Updated: 01/03/25 1158     Glucose 130 mg/dL      Comment: Serial Number: [id number]:  [id number]       Vitamin B12 [[id number]]  (Normal) Collected: 01/03/25 0322    Specimen: Blood from Arm, Left Updated: 01/03/25 1105     Vitamin B-12 393 pg/mL     Narrative:      Results may be falsely increased if patient taking Biotin.      Folate [[id number]]  (Normal) Collected: 01/03/25 0322    Specimen: Blood from Arm, Left Updated: 01/03/25 1105     Folate 14.60 ng/mL     Narrative:      Results may be falsely increased if patient taking Biotin.      POC Glucose 4x Daily Before Meals & at Bedtime [[id number]]  (Normal) Collected: 01/03/25 0706    Specimen: Blood Updated: 01/03/25 0708     Glucose 96 mg/dL      Comment: Serial Number: [id number]:  [id number]       Comprehensive Metabolic Panel [[id number]]  (Abnormal) Collected: 01/03/25 0322    Specimen: Blood from Arm, Left Updated: 01/03/25 0407     Glucose 118 mg/dL      BUN 15 mg/dL      Creatinine 1.19 mg/dL      Sodium 138 mmol/L      Potassium 4.1 mmol/L      Comment: Slight hemolysis detected by analyzer. Result may be falsely elevated.        Chloride 102 mmol/L      CO2 27.2 mmol/L      Calcium 9.1 mg/dL      Total Protein 8.0 g/dL      Albumin 4.4 g/dL      ALT (SGPT) 25 U/L      AST (SGOT) 34 U/L      Alkaline Phosphatase 90 U/L      Total Bilirubin 0.9 mg/dL      Globulin 3.6 gm/dL       A/G Ratio 1.2 g/dL      BUN/Creatinine Ratio 12.6     Anion Gap 8.8 mmol/L      eGFR 75.8 mL/min/1.73     Narrative:      GFR Categories in Chronic Kidney Disease (CKD)      GFR Category          GFR (mL/min/1.73)    Interpretation  G1                     90 or greater         Normal or high (1)  G2                      60-89                Mild decrease (1)  G3a                   45-59                Mild to moderate decrease  G3b                   30-44                Moderate to severe decrease  G4                    15-29                Severe decrease  G5                    14 or less           Kidney failure          (1)In the absence of evidence of kidney disease, neither GFR category G1 or G2 fulfill the criteria for CKD.    eGFR calculation 2021 CKD-EPI creatinine equation, which does not include race as a factor    Lipid Panel [[id number]] Collected: 01/03/25 0322    Specimen: Blood from Arm, Left Updated: 01/03/25 0406     Total Cholesterol 114 mg/dL      Triglycerides 60 mg/dL      HDL Cholesterol 43 mg/dL      LDL Cholesterol  58 mg/dL      VLDL Cholesterol 13 mg/dL      LDL/HDL Ratio 1.37    Narrative:      Cholesterol Reference Ranges  (U.[place] Department of Health and Human Services ATP III Classifications)    Desirable          <200 mg/dL  Borderline High    200-239 mg/dL  High Risk          >240 mg/dL      Triglyceride Reference Ranges  (U.S. Department of Health and Human Services ATP III Classifications)    Normal           <150 mg/dL  Borderline High  150-199 mg/dL  High             200-499 mg/dL  Very High        >500 mg/dL    HDL Reference Ranges  (U.S. Department of Health and Human Services ATP III Classifications)    Low     <40 mg/dl (major risk factor for CHD)  High    >60 mg/dl ('negative' risk factor for CHD)        LDL Reference Ranges  (U.S. Department of Health and Human Services ATP III Classifications)    Optimal          <100 mg/dL  Near Optimal     100-129 mg/dL  Borderline High   130-159 mg/dL  High             160-189 mg/dL  Very High        >189 mg/dL    TSH [[id number]]  (Normal) Collected: 01/03/25 0322    Specimen: Blood from Arm, Left Updated: 01/03/25 0406     TSH 0.836 uIU/mL     Hemoglobin A1c [[id number]]  (Abnormal) Collected: 01/03/25 0322    Specimen: Blood from Arm, Left Updated: 01/03/25 0358     Hemoglobin A1C 6.64 %     Sedimentation Rate [[id number]]  (Abnormal) Collected: 01/03/25 0322    Specimen: Blood from Arm, Left Updated: 01/03/25 0344     Sed Rate 42 mm/hr     CBC & Differential [[id number]]  (Abnormal) Collected: 01/03/25 0322    Specimen: Blood from Arm, Left Updated: 01/03/25 0337    Narrative:      The following orders were created for panel order CBC & Differential.  Procedure                               Abnormality         Status                     ---------                               -----------         ------                     CBC Auto Differential[[id number]]        Abnormal            Final result                 Please view results for these tests on the individual orders.    CBC Auto Differential [[id number]]  (Abnormal) Collected: 01/03/25 0322    Specimen: Blood from Arm, Left Updated: 01/03/25 0337     WBC 9.06 10*3/mm3      RBC 4.51 10*6/mm3      Hemoglobin 13.2 g/dL      Hematocrit 42.3 %      MCV 93.8 fL      MCH 29.3 pg      MCHC 31.2 g/dL      RDW 13.6 %      RDW-SD 47.0 fl      MPV 9.4 fL      Platelets 617 10*3/mm3      Neutrophil % 71.3 %      Lymphocyte % 17.0 %      Monocyte % 7.7 %      Eosinophil % 3.0 %      Basophil % 0.7 %      Immature Grans % 0.3 %      Neutrophils, Absolute 6.46 10*3/mm3      Lymphocytes, Absolute 1.54 10*3/mm3      Monocytes, Absolute 0.70 10*3/mm3      Eosinophils, Absolute 0.27 10*3/mm3      Basophils, Absolute 0.06 10*3/mm3      Immature Grans, Absolute 0.03 10*3/mm3      nRBC 0.0 /100 WBC     Comprehensive Metabolic Panel [[id number]]  (Abnormal) Collected: 01/02/25 2226    Specimen: Blood Updated: 01/02/25  2305     Glucose 120 mg/dL      BUN 16 mg/dL      Creatinine 1.28 mg/dL      Sodium 139 mmol/L      Potassium 3.8 mmol/L      Chloride 102 mmol/L      CO2 27.3 mmol/L      Calcium 9.1 mg/dL      Total Protein 7.9 g/dL      Albumin 4.2 g/dL      ALT (SGPT) 25 U/L      AST (SGOT) 32 U/L      Alkaline Phosphatase 101 U/L      Total Bilirubin 0.9 mg/dL      Globulin 3.7 gm/dL      A/G Ratio 1.1 g/dL      BUN/Creatinine Ratio 12.5     Anion Gap 9.7 mmol/L      eGFR 69.5 mL/min/1.73     Narrative:      GFR Categories in Chronic Kidney Disease (CKD)      GFR Category          GFR (mL/min/1.73)    Interpretation  G1                     90 or greater         Normal or high (1)  G2                      60-89                Mild decrease (1)  G3a                   45-59                Mild to moderate decrease  G3b                   30-44                Moderate to severe decrease  G4                    15-29                Severe decrease  G5                    14 or less           Kidney failure          (1)In the absence of evidence of kidney disease, neither GFR category G1 or G2 fulfill the criteria for CKD.    eGFR calculation 2021 CKD-EPI creatinine equation, which does not include race as a factor    Protime-INR [[id number]]  (Abnormal) Collected: 01/02/25 2226    Specimen: Blood Updated: 01/02/25 2258     Protime 14.4 Seconds      INR 1.11    aPTT [[id number]]  (Normal) Collected: 01/02/25 2226    Specimen: Blood Updated: 01/02/25 2258     PTT 28.5 seconds     CBC & Differential [[id number]]  (Abnormal) Collected: 01/02/25 2226    Specimen: Blood Updated: 01/02/25 2233    Narrative:      The following orders were created for panel order CBC & Differential.  Procedure                               Abnormality         Status                     ---------                               -----------         ------                     CBC Auto Differential[[id number]]        Abnormal            Final result                  Please view results for these tests on the individual orders.    CBC Auto Differential [[id number]]  (Abnormal) Collected: 01/02/25 2226    Specimen: Blood Updated: 01/02/25 2233     WBC 9.11 10*3/mm3      RBC 4.41 10*6/mm3      Hemoglobin 13.2 g/dL      Hematocrit 41.9 %      MCV 95.0 fL      MCH 29.9 pg      MCHC 31.5 g/dL      RDW 13.5 %      RDW-SD 47.5 fl      MPV 9.1 fL      Platelets 600 10*3/mm3      Neutrophil % 65.9 %      Lymphocyte % 19.3 %      Monocyte % 10.2 %      Eosinophil % 3.7 %      Basophil % 0.7 %      Immature Grans % 0.2 %      Neutrophils, Absolute 6.00 10*3/mm3      Lymphocytes, Absolute 1.76 10*3/mm3      Monocytes, Absolute 0.93 10*3/mm3      Eosinophils, Absolute 0.34 10*3/mm3      Basophils, Absolute 0.06 10*3/mm3      Immature Grans, Absolute 0.02 10*3/mm3      nRBC 0.0 /100 WBC     [place] Draw [[id number]] Collected: 01/02/25 2226    Specimen: Blood Updated: 01/02/25 2231    Narrative:      The following orders were created for panel order [place] Draw.  Procedure                               Abnormality         Status                     ---------                               -----------         ------                     Green Top (Gel)[[id number]]                                  Final result               Lavender Top[[id number]]                                     Final result               Gold Top - SST[[id number]]                                   Final result               Light Blue Top[[id number]]                                   Final result                 Please view results for these tests on the individual orders.    Green Top (Gel) [[id number]] Collected: 01/02/25 2226    Specimen: Blood Updated: 01/02/25 2231     Extra Tube Hold for add-ons.     Comment: Auto resulted.       Lavender Top [[id number]] Collected: 01/02/25 2226    Specimen: Blood Updated: 01/02/25 2231     Extra Tube hold for add-on     Comment: Auto resulted       Gold Top - SST [[id number]] Collected:  01/02/25 2226    Specimen: Blood Updated: 01/02/25 2231     Extra Tube Hold for add-ons.     Comment: Auto resulted.       Light Blue Top [[id number]] Collected: 01/02/25 2226    Specimen: Blood Updated: 01/02/25 2231     Extra Tube Hold for add-ons.     Comment: Auto resulted                Results for orders placed during the hospital encounter of 01/02/25    Adult Transthoracic Echo Complete W/ Cont if Necessary Per Protocol (With Agitated Saline)    Interpretation Summary    Left ventricular systolic function is normal. Calculated left ventricular EF = 51% Left ventricular ejection fraction appears to be 51 - 55%.    The left ventricular cavity is moderately dilated.    Left ventricular diastolic function is consistent with (grade II w/high LAP) pseudonormalization.  Average GLS -15.5%.    Saline test results are negative for right to left atrial level shunt.    Estimated right ventricular systolic pressure from tricuspid regurgitation is normal (<35 mmHg).    No significant valvular abnormalities noted.              Condition on Discharge:  Improved, stable    Vital Signs  Temp:  [97.2 °F (36.2 °C)-98 °F (36.7 °C)] 97.6 °F (36.4 °C)  Heart Rate:  [60-78] 60  Resp:  [16-22] 16  BP: (111-152)/(68-89) 118/81    Physical Exam:     General Appearance:    Alert, cooperative, in no acute distress   Head:    Normocephalic, without obvious abnormality, atraumatic   Eyes:            Lids and lashes normal, conjunctivae and sclerae normal, no   icterus, no pallor, corneas clear, PERRLA   Ears:    Ears appear intact with no abnormalities noted   Throat:   No oral lesions, no thrush, oral mucosa moist   Neck:   No adenopathy, supple, trachea midline, no thyromegaly, no   carotid bruit, no JVD   Lungs:     Clear to auscultation,respirations regular, even and                  unlabored    Heart:    Regular rhythm and normal rate, normal S1 and S2, no            murmur, no gallop, no rub, no click   Chest Wall:    No  abnormalities observed   Abdomen:     Normal bowel sounds, no masses, no organomegaly, soft        non-tender, non-distended, no guarding, no rebound                tenderness   Extremities:   Moves all extremities well, no edema, no cyanosis, no             redness   Pulses:   Pulses palpable and equal bilaterally   Skin:   No bleeding, bruising or rash   Lymph nodes:   No palpable adenopathy   Neurologic:   Cranial nerves 2 - 12 grossly intact, sensation intact, DTR       present and equal bilaterally       Discharge Disposition  Home or Self Care    Discharge Medications     Discharge Medications        New Medications        Instructions Start Date   acetaminophen 325 MG tablet  Commonly known as: TYLENOL   650 mg, Oral, Every 4 Hours PRN      clopidogrel 75 MG tablet  Commonly known as: PLAVIX   75 mg, Oral, Daily   Start Date: January 4, 2025            Changes to Medications        Instructions Start Date   atorvastatin 80 MG tablet  Commonly known as: LIPITOR  What changed:   medication strength  how much to take   80 mg, Oral, Nightly      fluticasone 50 MCG/ACT nasal spray  Commonly known as: Flonase  What changed:   when to take this  reasons to take this   2 sprays, Nasal, Daily PRN             Continue These Medications        Instructions Start Date   Aspirin Low Dose 81 MG EC tablet  Generic drug: aspirin   81 mg, Oral, Daily      bumetanide 1 MG tablet  Commonly known as: BUMEX   1 mg, Oral, 2 Times Daily      carvedilol 12.5 MG tablet  Commonly known as: COREG   12.5 mg, Oral, 2 Times Daily With Meals      lisinopril 10 MG tablet  Commonly known as: PRINIVIL,ZESTRIL   10 mg, Oral, Every 24 Hours Scheduled               Discharge Diet: Consistent carb    Activity at Discharge: No restrictions    Follow-up Appointments  Future Appointments   Date Time Provider Department Center   3/7/2025 10:00 AM [facility]   3/7/2025 10:30 AM Ridge Lindsay MD [facility]     Additional Instructions for the Follow-ups that You Need to Schedule       Discharge Follow-up with PCP   As directed       Currently Documented PCP:    Jarod Zuleta PA-C    PCP Phone Number:    [phone]     Follow Up Details: Call office Monday morning to schedule a hospital follow up appointment.                Test Results Pending at Discharge  Pending Results       Procedure [Order ID] Specimen - Date/Time    CBC & Differential [[id number]]     Specimen: Blood     Narrative:      The following orders were created for panel order CBC & Differential.  Procedure                               Abnormality         Status                     ---------                               -----------         ------                     CBC Auto Differential[[id number]]                                                         Please view results for these tests on the individual orders.    CBC Auto Differential [[id number]]     Specimen: Blood              Mayra La MD  01/03/25  16:10 EST    Time: Discharge 25 min

## 2025-01-03 NOTE — PLAN OF CARE
Goal Outcome Evaluation:  Plan of Care Reviewed With: patient     Problem: Adult Inpatient Plan of Care  Goal: Plan of Care Review  Outcome: Progressing  Flowsheets (Taken 1/3/2025 9224)  Progress: improving  Plan of Care Reviewed With: patient        Progress: improving

## 2025-01-03 NOTE — PLAN OF CARE
"Goal Outcome Evaluation:  Plan of Care Reviewed With: patient        Progress: no change  Outcome Evaluation: Pt is a 48 y/o M admitted to [facility] 1/2/25 with difficulty ambulating, difficulty speaking and R handed numbness. MRI (-) acute. Hitesh Carotid duplex pending. PMHx significant for pacemaker in place, hx combined systolic and diastolic failure. At baseline pt resides alone in ground floor apartment. Pt typically (I) with ADLs, (I) with mobility without AD, is an active  and works FT as security at [employer]. Pt cleared for OT by nursing, oriented x4 on RA sitting edge of bed with PT present. Pt reports some pain in R foot, similar to plantar fascitis pain, expresses \"swollen\" feeling in R side of face and feeling \"off\" mentally and physically. Pt demo no strength/ROM discrepancies and appears to demo good balance and activity tolerane with mobility. Pt does demo some word finding difficulties and exectutive function deficits. Pt would benefit from higher level cognitive screen and may benefit from OP SLP referral to address cognitive deficits. No skilled OT intervention warranted within acute setting, will complete orders at this time. Thank you for this referral.    Anticipated Discharge Disposition (OT): home                        "

## 2025-01-03 NOTE — CONSULTS
"Primary Care Provider: Provider, No Known     Consult requested by: ER and admitting team    Reason for Consultation: Neurological evaluation /code stroke    History taken from: patient chart    Chief complaint: Feeling \"foggy \"  Right leg numbness  Right foot plantar fasciitis type pain       SUBJECTIVE:    History of present illness: Background per H&P: Misael Carney is a 49 y.o. male who was evaluated in room 249 at [facility]    Source of information is the patient and the medical records    He presented late last evening for feeling \"foggy \"and kind of imbalance  There was concern about some tingling on the right side in the lower extremity  He then reported that he had plantar fasciitis type pain    He was never really focal    No loss of consciousness    No double vision    Sleep speech was slow but have nothing to compare but no aphasia    No migraines    No seizures    He has been sleepless for couple of nights    No alcohol or drugs or withdrawal    No change in medication    No infection    No falls or injuries    His NIH is essentially 0 for me    His vital signs were stable    Basic labs are okay    The question is whether he can get an MRI or not?          Vital signs stable  Hemoglobin A1c 6.64  LDL 58  CT, CTA and CTP okay    As per Dr. Black,  Chief Complaint: Woke up at 3 PM and noticed headache along with pressured speech and feeling off balance.  HPI: 49-year-old right-handed -American man who has a history of dilated cardiomyopathy and has an Abbott automatic intracardiac defibrillator implanted in 2023 currently on aspirin woke up at 3 PM and noticed that he was off balance and he was having a pressured speech along with headache.  At the same time he was complaining of numbness in the right side compared to the left.  He was brought in as a stroke alert and I was contacted at 10:25 PM and by the time the neuroimaging studies had been completed it was 10:40 PM before I " could see him on the telemedicine device.  I noticed there was no facial asymmetry or visual changes.  He could tell me his name and date of birth but it was a pressured speech he was also complaining of a headache.  No weakness or drift was noticeable in the upper and lower extremities and he was complaining of numbness in the right side of the face and right upper and lower extremities compared to the left.  No facial asymmetry was noticeable.  No visual changes were noticeable.  He followed one-step two-step and three-step commands well.  No dysmetria on finger-to-nose or heel-to-shin testing noted.  He was able to get out of the bed and stand up independently and the Romberg was negative.  The NIH stroke scale was 2.  As the patient woke up from sleep at 3 PM and the last known normal was not clear patient was not a candidate for any acute thrombolytic therapy.  The CT angiogram did not show any evidence of any retrievable thrombus.  Patient will be admitted to the hospitalist service here with stroke workup.  He passed bedside swallow.      - Portions of the above HPI were copied from previous encounters and edited as appropriate. PMH as detailed below.     Review of Systems   No fever chills rigors or sweats  No weight issues  No sleep problems  HEENT:  No speech problem, vision changes, facial asymmetry or pain, or neck problem  Chest: No chest pain, clubbing, cyanosis, orthopnea does have dilated cardiomyopathy and CHF  Pulmonary:  No shortness of air, cough or expectoration  Abdomen:  No swelling/tension, constipation,diarrhea or pain  No genitourinary symptoms  Extremity problems as discussed  No back problem  No psychotic issues  Neurologic issues as discussed  No hematologic, dermatologic or endocrine problems, is diabetic        PATIENT HISTORY:  Past Medical History:   Diagnosis Date    Abnormal ECG 9/19/2022    CHF (congestive heart failure)     Congenital heart disease 9/19/2022    Coronary artery  disease     Diabetes mellitus     LBBB (left bundle branch block) 04/04/2023    Other hyperlipidemia 09/19/2022    Peptic ulcer disease    ,   Past Surgical History:   Procedure Laterality Date    ANKLE SURGERY Left 2016    CARDIAC CATHETERIZATION Bilateral 09/21/2022    Procedure: Right and Left Heart Cath;  Surgeon: Ridge Lindsay MD;  Location:  [facility];  Service: Cardiovascular;  Laterality: Bilateral;    CARDIAC DEFIBRILLATOR PLACEMENT  03/02/2023    CARDIAC ELECTROPHYSIOLOGY PROCEDURE N/A 03/02/2023    Procedure: BivICD Abbott aware;  Surgeon: Marci Cerda MD;  Location:  [facility];  Service: Cardiovascular;  Laterality: N/A;   ,   Family History   Problem Relation Age of Onset    Asthma Mother     Heart attack Father     Heart disease Father     Heart failure Father     Hypertension Father    ,   Social History     Tobacco Use    Smoking status: Never    Smokeless tobacco: Never   Vaping Use    Vaping status: Never Used   Substance Use Topics    Alcohol use: Not Currently     Comment: occ    Drug use: Never   ,   Prior to Admission medications    Medication Sig Start Date End Date Taking? Authorizing Provider   aspirin 81 MG EC tablet Take 1 tablet by mouth Daily. 9/24/22  Yes Susan Adam DO   atorvastatin (LIPITOR) 40 MG tablet Take 1 tablet by mouth Every Night. 10/10/24  Yes Ridge Lindsay MD   bumetanide (BUMEX) 1 MG tablet Take 1 tablet by mouth 2 (Two) Times a Day. 7/17/24  Yes Ridge Lindsay MD   carvedilol (COREG) 12.5 MG tablet Take 1 tablet by mouth 2 (Two) Times a Day With Meals. 10/10/24  Yes Ridge Lindsay MD   lisinopril (PRINIVIL,ZESTRIL) 10 MG tablet Take 1 tablet by mouth Daily. 10/10/24  Yes Ridge Lindsay MD   fluticasone (Flonase) 50 MCG/ACT nasal spray instill 2 sprays into the nostril(s) daily  Patient taking differently: Administer 2 sprays into the nostril(s) as directed by provider Daily As Needed. 9/23/22   Susan Adam DO     Allergies:  Nsaids    Current Facility-Administered Medications   Medication Dose Route Frequency Provider Last Rate Last Admin    acetaminophen (TYLENOL) tablet 650 mg  650 mg Oral Q4H PRN Domenica Franklin APRN        Or    acetaminophen (TYLENOL) suppository 650 mg  650 mg Rectal Q4H PRN Domenica Franklin APRN        aluminum-magnesium hydroxide-simethicone (MAALOX MAX) 400-400-40 MG/5ML suspension 7.5 mL  7.5 mL Oral Q4H PRN Domenica Franklin APRN        aspirin chewable tablet 81 mg  81 mg Oral Daily Domenica Franklin APRN   81 mg at 01/03/25 0924    Or    aspirin suppository 300 mg  300 mg Rectal Daily Domenica Franklin APRN        atorvastatin (LIPITOR) tablet 80 mg  80 mg Oral Nightly Domenica Franklin APRN        sennosides-docusate (PERICOLACE) 8.6-50 MG per tablet 2 tablet  2 tablet Oral BID PRN Domenica Franklin APRN        And    polyethylene glycol (MIRALAX) packet 17 g  17 g Oral Daily PRN Domenica Franklin APRN        And    bisacodyl (DULCOLAX) EC tablet 5 mg  5 mg Oral Daily PRN Domenica Franklin APRN        bisacodyl (DULCOLAX) suppository 10 mg  10 mg Rectal Daily PRN Domeniac Franklin APRN        bumetanide (BUMEX) tablet 1 mg  1 mg Oral BID Domenica Franklin APRN   1 mg at 01/03/25 0924    Calcium Replacement - Follow Nurse / BPA Driven Protocol   Not Applicable PRN Domenica Franklin APRN        carvedilol (COREG) tablet 12.5 mg  12.5 mg Oral BID With Meals Domenica Franklin APRN   12.5 mg at 01/03/25 0922    clopidogrel (PLAVIX) tablet 75 mg  75 mg Oral Daily Domenica Franklin APRN   75 mg at 01/03/25 0924    dextrose (D50W) (25 g/50 mL) IV injection 25 g  25 g Intravenous Q15 Min PRN Domenica Franklin APRN        dextrose (GLUTOSE) oral gel 15 g  15 g Oral Q15 Min PRN Domenica Franklin APRN        famotidine (PEPCID) tablet 40 mg  40 mg Oral Daily Domenica Franklin APRN   40 mg at 01/03/25 0924    glucagon (GLUCAGEN) injection 1 mg  1 mg Intramuscular Q15 Min PRN Domenica Franklin APRN        insulin lispro  (HUMALOG/ADMELOG) injection 2-7 Units  2-7 Units Subcutaneous TID With Meals Domenica Franklin APRN        lisinopril (PRINIVIL,ZESTRIL) tablet 10 mg  10 mg Oral Q24H Domenica Franklin APRN   10 mg at 01/03/25 0922    Magnesium Standard Dose Replacement - Follow Nurse / BPA Driven Protocol   Not Applicable PRN Domenica Franklin N, APRN        nitroglycerin (NITROSTAT) SL tablet 0.4 mg  0.4 mg Sublingual Q5 Min PRN Domenica Franklin APRN        ondansetron (ZOFRAN) injection 4 mg  4 mg Intravenous Q6H PRN Domenica Franklin N, APRN        Phosphorus Replacement - Follow Nurse / BPA Driven Protocol   Not Applicable PRN Domenica Franklin APRN        Potassium Replacement - Follow Nurse / BPA Driven Protocol   Not Applicable PRN Domenica Franklin, APRN        sodium chloride 0.9 % flush 10 mL  10 mL Intravenous PRN Domenica Franklin N APRN        sodium chloride 0.9 % flush 10 mL  10 mL Intravenous Q12H Domenica Franklin, APRN   10 mL at 01/03/25 0925    sodium chloride 0.9 % flush 10 mL  10 mL Intravenous PRN Domenica Franklin N, APRN        sodium chloride 0.9 % flush 10 mL  10 mL Intravenous Q12H Domenica Franklin, APRN   10 mL at 01/03/25 0925    sodium chloride 0.9 % flush 10 mL  10 mL Intravenous PRN Domenica Franklin, APRN        sodium chloride 0.9 % infusion 40 mL  40 mL Intravenous PRN Domenica Franklin N, APRN            ________________________________________________________        OBJECTIVE:  Upon today's exam, the gentleman is resting comfortably in bedside chair in no acute distress      The patient is awake and alert and oriented x3  Speech is slow but not aphasic  Cranial nerve examination demonstrate:  Full fields of vision to confrontation  Pupils are round, reactive to light and accommodation and size of about 3 mm  No ptosis or nystagmus  Funduscopic examination was not successful  Eye movements are conjugate     Sensation on the face and scalp are normal  Muscles of mastication are normal and symmetric  Muscles of  facial  expression are normal and symmetric  Hearing is intact bilaterally  Head turning and shoulder shrugs were unremarkable  Tongue was midline  I could not visualize  oropharynx or uvula     Motor examination:  Normal bulk, tone and strength was 5/5  No fasciculations     Sensory examination:  Intact for soft touch, pain   Romberg was not evaluated     Reflexes:  0/4     Coordination:  Normal finger-to-nose to finger, rapid alternating movements and toe to finger     Gait:  I got him up and he took a few steps and I did not see any abnormality     Toe signs:  Mute      NIH Stroke Scale  Interval: baseline  1a. Level of Consciousness: 0-->Alert, keenly responsive  1b. LOC Questions: 0-->Answers both questions correctly  1c. LOC Commands: 0-->Performs both tasks correctly  2. Best Gaze: 0-->Normal  3. Visual: 0-->No visual loss  4. Facial Palsy: 0-->Normal symmetrical movements  5a. Motor Arm, Left: 0-->No drift, limb holds 90 (or 45) degrees for full 10 secs  5b. Motor Arm, Right: 0-->No drift, limb holds 90 (or 45) degrees for full 10 secs  6a. Motor Leg, Left: 0-->No drift, leg holds 30 degree position for full 5 secs  6b. Motor Leg, Right: 0-->No drift, leg holds 30 degree position for full 5 secs  7. Limb Ataxia: 0-->Absent  8. Sensory: 1-->Mild-to-moderate sensory loss, patient feels pinprick is less sharp or is dull on the affected side, or there is a loss of superficial pain with pinprick, but patient is aware of being touched  9. Best Language: 0-->No aphasia, normal  10. Dysarthria: 0-->Normal  11. Extinction and Inattention (formerly Neglect): 0-->No abnormality  Total (NIH Stroke Scale): 1      [place] Coma Scale  Best Eye Response: Spontaneous  Best Verbal Response: Oriented  Best Motor Response: Follows commands  [place] Coma Scale Score: 15    Modified [place] Scale  Pre-Stroke Modified [place] Scale: 0 - No Symptoms at all.         ________________________________________________________   RESULTS  REVIEW:    VITAL SIGNS:   Temp:  [97.2 °F (36.2 °C)-98 °F (36.7 °C)] 97.2 °F (36.2 °C)  Heart Rate:  [60-78] 60  Resp:  [17-22] 17  BP: (111-152)/(70-89) 111/78     LABS:      Lab 01/03/25  0322 01/02/25  2226   WBC 9.06 9.11   HEMOGLOBIN 13.2 13.2   HEMATOCRIT 42.3 41.9   PLATELETS 617* 600*   NEUTROS ABS 6.46 6.00   IMMATURE GRANS (ABS) 0.03 0.02   LYMPHS ABS 1.54 1.76   MONOS ABS 0.70 0.93*   EOS ABS 0.27 0.34   MCV 93.8 95.0   SED RATE 42*  --    PROTIME  --  14.4*   APTT  --  28.5         Lab 01/03/25  0322 01/02/25  2226   SODIUM 138 139   POTASSIUM 4.1 3.8   CHLORIDE 102 102   CO2 27.2 27.3   ANION GAP 8.8 9.7   BUN 15 16   CREATININE 1.19 1.28*   EGFR 75.8 69.5   GLUCOSE 118* 120*   CALCIUM 9.1 9.1   HEMOGLOBIN A1C 6.64*  --    TSH 0.836  --          Lab 01/03/25  0322 01/02/25  2226   TOTAL PROTEIN 8.0 7.9   ALBUMIN 4.4 4.2   GLOBULIN 3.6 3.7   ALT (SGPT) 25 25   AST (SGOT) 34 32   BILIRUBIN 0.9 0.9   ALK PHOS 90 101         Lab 01/02/25  2226   PROTIME 14.4*   INR 1.11*         Lab 01/03/25  0322   CHOLESTEROL 114   LDL CHOL 58   HDL CHOL 43   TRIGLYCERIDES 60         Lab 01/02/25  2226   ABO TYPING O   RH TYPING Positive   ANTIBODY SCREEN Negative             Lab Results   Component Value Date    TSH 0.836 01/03/2025    LDL 58 01/03/2025    HGBA1C 6.64 (H) 01/03/2025       IMAGING STUDIES:  CT Angiogram Head w AI Analysis of LVO    Result Date: 1/2/2025  Impression: Normal CT angiogram of the head and neck. There is no evidence of flow-limiting stenosis, large vessel occlusion or aneurysm. Electronically Signed: Jalil Rodriguez MD  1/2/2025 11:26 PM EST  Workstation ID: [id number]    CT Angiogram Neck    Result Date: 1/2/2025  Impression: Normal CT angiogram of the head and neck. There is no evidence of flow-limiting stenosis, large vessel occlusion or aneurysm. Electronically Signed: Jalil Rodriguez MD  1/2/2025 11:26 PM EST  Workstation ID: [id number]    XR Chest 1 View    Result Date: 1/2/2025  Impression:  "No acute cardiopulmonary process. Electronically Signed: Kay De Leon MD  1/2/2025 11:15 PM EST  Workstation ID: [id number]    CT CEREBRAL PERFUSION WITH & WITHOUT CONTRAST    Result Date: 1/2/2025  Impression: No evidence of core infarct or territorial ischemic tissue at risk. Electronically Signed: Jalil Rodriguez MD  1/2/2025 10:50 PM EST  Workstation ID: [id number]    CT Head Without Contrast Stroke Protocol    Result Date: 1/2/2025  Impression: No acute intracranial process identified. Electronically Signed: Brigido Leon MD  1/2/2025 10:42 PM EST  Workstation ID: [id number]     I reviewed the patient's new clinical results.    ________________________________________________________     PROBLEM LIST:    TIA (transient ischemic attack)            ASSESSMENT/PLAN:  Feeling foggy  Very nonspecific otherwise    He had some tingling in the right lower extremity    His NIH is otherwise unremarkable    He is not tenecteplase or intervention candidate    I would recommend checking his basic labs including thyroid functions    He is already on aspirin and Lipitor at home, his LDL is 58  Hemoglobin A1c 6.64    So I will check thyroid functions and B12 and ammonia    He may or may not get MRI    If he gets an MRI then if it is negative then I would not change anything, if its positive then he may need anticoagulation because of his CHF    Nothing suggesting any back problems but if things do not improve or get worse in then he may need nerve conduction and EMGs because he is diabetic    But I cannot explain his \"foggy state \"    Call me if needed    Modification of stroke risk factors:   - Blood pressure should be less than 130/80 outpatient, HbA1c less than 6.5, LDL less than 70; b12>500 and smoking cessation if applicable. We would be grateful if the primary team / primary care physician would keep a close watch on the above targets.  - Stroke education  - Follow up with neurologist of choice      I discussed the patient's " findings and my recommendations with patient    Stacy Ragland MD  01/03/25  09:51 EST

## 2025-01-03 NOTE — CONSULTS
Referring Provider: Mayra La MD    Reason for Consultation: Strokelike symptoms      Patient Care Team:  Jarod Zuleta PA-C as PCP - General (Physician Assistant)  Ridge Lindsay MD as Cardiologist (Cardiology)  Marci Cerda MD as Consulting Physician (Cardiology)      SUBJECTIVE     Chief Complaint: Difficulty speaking and thinking clearly    History of present illness:  Misael Carney is a 49 y.o. male with nonischemic dilated cardiomyopathy with BiV ICD placement who presented to the hospital with complaints of mental fogging, difficulty speaking and ambulating.  Stroke alert was called.  Labs were unremarkable.  ECG showed BiV paced rhythm.  CT head and neck is unremarkable.  MRI is pending.    From cardiac standpoint he has been on aspirin, statin, Bumex, Coreg and lisinopril      Review of systems:    Constitutional: + weakness, fatigue, fever, rigors, chills   Eyes: No vision changes, eye pain   ENT/oropharynx: No difficulty swallowing, sore throat, epistaxis, changes in hearing   Cardiovascular: No chest pain, chest tightness, palpitations, paroxysmal nocturnal dyspnea, orthopnea, diaphoresis, dizziness / syncopal episode   Respiratory: No shortness of breath, dyspnea on exertion, cough, wheezing, hemoptysis   Gastrointestinal: No abdominal pain, nausea, vomiting, diarrhea, bloody stools   Genitourinary: No hematuria, dysuria   Neurological: No headache, tremors, numbness, one-sided weakness    Musculoskeletal: No cramps, myalgias, joint pain, joint swelling   Integument: No rash, edema        Personal History:      Past Medical History:   Diagnosis Date    Abnormal ECG 9/19/2022    CHF (congestive heart failure)     Congenital heart disease 9/19/2022    Coronary artery disease     Diabetes mellitus     LBBB (left bundle branch block) 04/04/2023    Other hyperlipidemia 09/19/2022    Peptic ulcer disease        Past Surgical History:   Procedure Laterality Date    ANKLE SURGERY Left 2016     CARDIAC CATHETERIZATION Bilateral 09/21/2022    Procedure: Right and Left Heart Cath;  Surgeon: Ridge Lindsay MD;  Location:  [facility];  Service: Cardiovascular;  Laterality: Bilateral;    CARDIAC DEFIBRILLATOR PLACEMENT  03/02/2023    CARDIAC ELECTROPHYSIOLOGY PROCEDURE N/A 03/02/2023    Procedure: BivICD Abbott aware;  Surgeon: Marci Cerda MD;  Location:  [facility];  Service: Cardiovascular;  Laterality: N/A;       Family History   Problem Relation Age of Onset    Asthma Mother     Heart attack Father     Heart disease Father     Heart failure Father     Hypertension Father        Social History     Tobacco Use    Smoking status: Never    Smokeless tobacco: Never   Vaping Use    Vaping status: Never Used   Substance Use Topics    Alcohol use: Not Currently     Comment: occ    Drug use: Never        Home meds:  Prior to Admission medications    Medication Sig Start Date End Date Taking? Authorizing Provider   aspirin 81 MG EC tablet Take 1 tablet by mouth Daily. 9/24/22  Yes Susan Adam DO   atorvastatin (LIPITOR) 40 MG tablet Take 1 tablet by mouth Every Night. 10/10/24  Yes Ridge Lindsay MD   bumetanide (BUMEX) 1 MG tablet Take 1 tablet by mouth 2 (Two) Times a Day. 7/17/24  Yes Ridge Lindsay MD   carvedilol (COREG) 12.5 MG tablet Take 1 tablet by mouth 2 (Two) Times a Day With Meals. 10/10/24  Yes Ridge Lindsay MD   lisinopril (PRINIVIL,ZESTRIL) 10 MG tablet Take 1 tablet by mouth Daily. 10/10/24  Yes Ridge Lindsay MD   fluticasone (Flonase) 50 MCG/ACT nasal spray instill 2 sprays into the nostril(s) daily  Patient taking differently: Administer 2 sprays into the nostril(s) as directed by provider Daily As Needed. 9/23/22   Susan Adam DO       Allergies:     Nsaids    Scheduled Meds:aspirin, 81 mg, Oral, Daily   Or  aspirin, 300 mg, Rectal, Daily  atorvastatin, 80 mg, Oral, Nightly  bumetanide, 1 mg, Oral, BID  carvedilol, 12.5 mg, Oral, BID With  "Meals  clopidogrel, 75 mg, Oral, Daily  famotidine, 40 mg, Oral, Daily  insulin lispro, 2-7 Units, Subcutaneous, TID With Meals  lisinopril, 10 mg, Oral, Q24H  sodium chloride, 10 mL, Intravenous, Q12H  sodium chloride, 10 mL, Intravenous, Q12H      Continuous Infusions:   PRN Meds:  acetaminophen **OR** acetaminophen    aluminum-magnesium hydroxide-simethicone    senna-docusate sodium **AND** polyethylene glycol **AND** bisacodyl **AND** [DISCONTINUED] bisacodyl    bisacodyl    Calcium Replacement - Follow Nurse / BPA Driven Protocol    dextrose    dextrose    glucagon (human recombinant)    Magnesium Standard Dose Replacement - Follow Nurse / BPA Driven Protocol    nitroglycerin    ondansetron    Phosphorus Replacement - Follow Nurse / BPA Driven Protocol    Potassium Replacement - Follow Nurse / BPA Driven Protocol    sodium chloride    sodium chloride    sodium chloride    sodium chloride      OBJECTIVE    Vital Signs  Vitals:    01/02/25 2346 01/03/25 0001 01/03/25 0142 01/03/25 0406   BP: 114/79 117/70 118/82 113/74   BP Location:    Left arm   Patient Position:    Lying   Pulse: 66 60 61 60   Resp:    17   Temp:   98 °F (36.7 °C) 97.7 °F (36.5 °C)   TempSrc:   Oral Oral   SpO2: 94% 98% 100%    Weight:       Height:           Flowsheet Rows      Flowsheet Row First Filed Value   Admission Height 188 cm (74\") Documented at 01/02/2025 2213   Admission Weight 151 kg (332 lb 14.3 oz) Documented at 01/02/2025 2213            No intake or output data in the 24 hours ending 01/03/25 0505     Telemetry: BiV paced rhythm    Physical Exam:  The patient is alert, oriented and in no distress.  Obese  Vital signs as noted above.  Head and neck revealed no carotid bruits or jugular venous distention.  No thyromegaly or lymphadenopathy is present  Lungs clear.  No wheezing.  Breath sounds are normal bilaterally.  Heart: Normal first and second heart sounds. No murmur.  No precordial rub is present.  No gallop is " present.  Abdomen: Soft and nontender.  No organomegaly is present.  Extremities with good peripheral pulses without any pedal edema.  Skin: Warm and dry.  Musculoskeletal system is grossly normal.  CNS grossly normal.       Results Review:  I have personally reviewed the results from the time of this admission to 1/3/2025 05:05 EST and agree with these findings:  []  Laboratory  []  Microbiology  []  Radiology  []  EKG/Telemetry   []  Cardiology/Vascular   []  Pathology  []  Old records  []  Other:    Most notable findings include:     Lab Results (last 24 hours)       Procedure Component Value Units Date/Time    Comprehensive Metabolic Panel [[id number]]  (Abnormal) Collected: 01/03/25 0322    Specimen: Blood from Arm, Left Updated: 01/03/25 0407     Glucose 118 mg/dL      BUN 15 mg/dL      Creatinine 1.19 mg/dL      Sodium 138 mmol/L      Potassium 4.1 mmol/L      Comment: Slight hemolysis detected by analyzer. Result may be falsely elevated.        Chloride 102 mmol/L      CO2 27.2 mmol/L      Calcium 9.1 mg/dL      Total Protein 8.0 g/dL      Albumin 4.4 g/dL      ALT (SGPT) 25 U/L      AST (SGOT) 34 U/L      Alkaline Phosphatase 90 U/L      Total Bilirubin 0.9 mg/dL      Globulin 3.6 gm/dL      A/G Ratio 1.2 g/dL      BUN/Creatinine Ratio 12.6     Anion Gap 8.8 mmol/L      eGFR 75.8 mL/min/1.73     Narrative:      GFR Categories in Chronic Kidney Disease (CKD)      GFR Category          GFR (mL/min/1.73)    Interpretation  G1                     90 or greater         Normal or high (1)  G2                      60-89                Mild decrease (1)  G3a                   45-59                Mild to moderate decrease  G3b                   30-44                Moderate to severe decrease  G4                    15-29                Severe decrease  G5                    14 or less           Kidney failure          (1)In the absence of evidence of kidney disease, neither GFR category G1 or G2 fulfill the criteria  for CKD.    eGFR calculation 2021 CKD-EPI creatinine equation, which does not include race as a factor    Lipid Panel [[id number]] Collected: 01/03/25 0322    Specimen: Blood from Arm, Left Updated: 01/03/25 0406     Total Cholesterol 114 mg/dL      Triglycerides 60 mg/dL      HDL Cholesterol 43 mg/dL      LDL Cholesterol  58 mg/dL      VLDL Cholesterol 13 mg/dL      LDL/HDL Ratio 1.37    Narrative:      Cholesterol Reference Ranges  (U.S. Department of Health and Human Services ATP III Classifications)    Desirable          <200 mg/dL  Borderline High    200-239 mg/dL  High Risk          >240 mg/dL      Triglyceride Reference Ranges  (U.S. Department of Health and Human Services ATP III Classifications)    Normal           <150 mg/dL  Borderline High  150-199 mg/dL  High             200-499 mg/dL  Very High        >500 mg/dL    HDL Reference Ranges  (U.S. Department of Health and Human Services ATP III Classifications)    Low     <40 mg/dl (major risk factor for CHD)  High    >60 mg/dl ('negative' risk factor for CHD)        LDL Reference Ranges  (U.S. Department of Health and Human Services ATP III Classifications)    Optimal          <100 mg/dL  Near Optimal     100-129 mg/dL  Borderline High  130-159 mg/dL  High             160-189 mg/dL  Very High        >189 mg/dL    TSH [[id number]]  (Normal) Collected: 01/03/25 0322    Specimen: Blood from Arm, Left Updated: 01/03/25 0406     TSH 0.836 uIU/mL     Hemoglobin A1c [[id number]]  (Abnormal) Collected: 01/03/25 0322    Specimen: Blood from Arm, Left Updated: 01/03/25 0358     Hemoglobin A1C 6.64 %     Sedimentation Rate [[id number]]  (Abnormal) Collected: 01/03/25 0322    Specimen: Blood from Arm, Left Updated: 01/03/25 0344     Sed Rate 42 mm/hr     CBC & Differential [[id number]]  (Abnormal) Collected: 01/03/25 0322    Specimen: Blood from Arm, Left Updated: 01/03/25 0337    Narrative:      The following orders were created for panel order CBC &  Differential.  Procedure                               Abnormality         Status                     ---------                               -----------         ------                     CBC Auto Differential[[id number]]        Abnormal            Final result                 Please view results for these tests on the individual orders.    CBC Auto Differential [[id number]]  (Abnormal) Collected: 01/03/25 0322    Specimen: Blood from Arm, Left Updated: 01/03/25 0337     WBC 9.06 10*3/mm3      RBC 4.51 10*6/mm3      Hemoglobin 13.2 g/dL      Hematocrit 42.3 %      MCV 93.8 fL      MCH 29.3 pg      MCHC 31.2 g/dL      RDW 13.6 %      RDW-SD 47.0 fl      MPV 9.4 fL      Platelets 617 10*3/mm3      Neutrophil % 71.3 %      Lymphocyte % 17.0 %      Monocyte % 7.7 %      Eosinophil % 3.0 %      Basophil % 0.7 %      Immature Grans % 0.3 %      Neutrophils, Absolute 6.46 10*3/mm3      Lymphocytes, Absolute 1.54 10*3/mm3      Monocytes, Absolute 0.70 10*3/mm3      Eosinophils, Absolute 0.27 10*3/mm3      Basophils, Absolute 0.06 10*3/mm3      Immature Grans, Absolute 0.03 10*3/mm3      nRBC 0.0 /100 WBC     Vitamin B12 [[id number]] Collected: 01/03/25 0322    Specimen: Blood from Arm, Left Updated: 01/03/25 0331    Folate [[id number]] Collected: 01/03/25 0322    Specimen: Blood from Arm, Left Updated: 01/03/25 0331    Comprehensive Metabolic Panel [[id number]]  (Abnormal) Collected: 01/02/25 2226    Specimen: Blood Updated: 01/02/25 2305     Glucose 120 mg/dL      BUN 16 mg/dL      Creatinine 1.28 mg/dL      Sodium 139 mmol/L      Potassium 3.8 mmol/L      Chloride 102 mmol/L      CO2 27.3 mmol/L      Calcium 9.1 mg/dL      Total Protein 7.9 g/dL      Albumin 4.2 g/dL      ALT (SGPT) 25 U/L      AST (SGOT) 32 U/L      Alkaline Phosphatase 101 U/L      Total Bilirubin 0.9 mg/dL      Globulin 3.7 gm/dL      A/G Ratio 1.1 g/dL      BUN/Creatinine Ratio 12.5     Anion Gap 9.7 mmol/L      eGFR 69.5 mL/min/1.73     Narrative:       GFR Categories in Chronic Kidney Disease (CKD)      GFR Category          GFR (mL/min/1.73)    Interpretation  G1                     90 or greater         Normal or high (1)  G2                      60-89                Mild decrease (1)  G3a                   45-59                Mild to moderate decrease  G3b                   30-44                Moderate to severe decrease  G4                    15-29                Severe decrease  G5                    14 or less           Kidney failure          (1)In the absence of evidence of kidney disease, neither GFR category G1 or G2 fulfill the criteria for CKD.    eGFR calculation 2021 CKD-EPI creatinine equation, which does not include race as a factor    Protime-INR [[id number]]  (Abnormal) Collected: 01/02/25 2226    Specimen: Blood Updated: 01/02/25 2258     Protime 14.4 Seconds      INR 1.11    aPTT [[id number]]  (Normal) Collected: 01/02/25 2226    Specimen: Blood Updated: 01/02/25 2258     PTT 28.5 seconds     CBC & Differential [[id number]]  (Abnormal) Collected: 01/02/25 2226    Specimen: Blood Updated: 01/02/25 2233    Narrative:      The following orders were created for panel order CBC & Differential.  Procedure                               Abnormality         Status                     ---------                               -----------         ------                     CBC Auto Differential[[id number]]        Abnormal            Final result                 Please view results for these tests on the individual orders.    CBC Auto Differential [[id number]]  (Abnormal) Collected: 01/02/25 2226    Specimen: Blood Updated: 01/02/25 2233     WBC 9.11 10*3/mm3      RBC 4.41 10*6/mm3      Hemoglobin 13.2 g/dL      Hematocrit 41.9 %      MCV 95.0 fL      MCH 29.9 pg      MCHC 31.5 g/dL      RDW 13.5 %      RDW-SD 47.5 fl      MPV 9.1 fL      Platelets 600 10*3/mm3      Neutrophil % 65.9 %      Lymphocyte % 19.3 %      Monocyte % 10.2 %      Eosinophil % 3.7 %       Basophil % 0.7 %      Immature Grans % 0.2 %      Neutrophils, Absolute 6.00 10*3/mm3      Lymphocytes, Absolute 1.76 10*3/mm3      Monocytes, Absolute 0.93 10*3/mm3      Eosinophils, Absolute 0.34 10*3/mm3      Basophils, Absolute 0.06 10*3/mm3      Immature Grans, Absolute 0.02 10*3/mm3      nRBC 0.0 /100 WBC     [place] Draw [[id number]] Collected: 01/02/25 2226    Specimen: Blood Updated: 01/02/25 2231    Narrative:      The following orders were created for panel order [place] Draw.  Procedure                               Abnormality         Status                     ---------                               -----------         ------                     Green Top (Gel)[[id number]]                                  Final result               Lavender Top[[id number]]                                     Final result               Gold Top - SST[[id number]]                                   Final result               Light Blue Top[[id number]]                                   Final result                 Please view results for these tests on the individual orders.    Green Top (Gel) [[id number]] Collected: 01/02/25 2226    Specimen: Blood Updated: 01/02/25 2231     Extra Tube Hold for add-ons.     Comment: Auto resulted.       Lavender Top [[id number]] Collected: 01/02/25 2226    Specimen: Blood Updated: 01/02/25 2231     Extra Tube hold for add-on     Comment: Auto resulted       Gold Top - SST [[id number]] Collected: 01/02/25 2226    Specimen: Blood Updated: 01/02/25 2231     Extra Tube Hold for add-ons.     Comment: Auto resulted.       Light Blue Top [[id number]] Collected: 01/02/25 2226    Specimen: Blood Updated: 01/02/25 2231     Extra Tube Hold for add-ons.     Comment: Auto resulted               Imaging Results (Last 24 Hours)       Procedure Component Value Units Date/Time    CT Angiogram Head w AI Analysis of LVO [[id number]] Collected: 01/02/25 2321     Updated: 01/02/25 2328    Narrative:      CT ANGIOGRAM  HEAD W AI ANALYSIS OF LVO, CT ANGIOGRAM NECK    Date of Exam: 1/2/2025 10:48 PM EST    Indication: Stroke, follow up  Neuro deficit, acute, stroke suspected  Acute Stroke.    Comparison: None available.    Technique: CTA of the head and neck was performed after the uneventful intravenous administration of iodinated contrast. Reconstructed coronal and sagittal images were also obtained. In addition, a 3-D volume rendered image was created for   interpretation. Automated exposure control and iterative reconstruction methods were used.      Findings:  The lung apices are clear. Evaluation of the neck soft tissues demonstrates no pathologic cervical adenopathy or unexpected aerodigestive tract mass. The osseous structures demonstrate no evidence of acute fracture or aggressive osseous lesion. Patent   aortic arch with typical three-vessel branching. The visualized subclavian arteries are patent bilaterally. There is no significant atherosclerotic narrowing of the ICA origins, 0% stenosis present by NASCET criteria bilaterally. The vertebral arteries   are normal in course and caliber bilaterally. Intracranially, the carotid siphons are patent. The anterior cerebral arteries are normal in course and caliber. The right and left middle cerebral arteries demonstrate no evidence of flow-limiting stenosis,   large vessel occlusion or aneurysm. The vertebral basilar system is patent. The posterior cerebral arteries are normal in course and caliber bilaterally.      Impression:      Impression:  Normal CT angiogram of the head and neck. There is no evidence of flow-limiting stenosis, large vessel occlusion or aneurysm.        Electronically Signed: Jalil Rodriguez MD    1/2/2025 11:26 PM EST    Workstation ID: [id number]    CT Angiogram Neck [[id number]] Collected: 01/02/25 2321     Updated: 01/02/25 2328    Narrative:      CT ANGIOGRAM HEAD W AI ANALYSIS OF LVO, CT ANGIOGRAM NECK    Date of Exam: 1/2/2025 10:48 PM  EST    Indication: Stroke, follow up  Neuro deficit, acute, stroke suspected  Acute Stroke.    Comparison: None available.    Technique: CTA of the head and neck was performed after the uneventful intravenous administration of iodinated contrast. Reconstructed coronal and sagittal images were also obtained. In addition, a 3-D volume rendered image was created for   interpretation. Automated exposure control and iterative reconstruction methods were used.      Findings:  The lung apices are clear. Evaluation of the neck soft tissues demonstrates no pathologic cervical adenopathy or unexpected aerodigestive tract mass. The osseous structures demonstrate no evidence of acute fracture or aggressive osseous lesion. Patent   aortic arch with typical three-vessel branching. The visualized subclavian arteries are patent bilaterally. There is no significant atherosclerotic narrowing of the ICA origins, 0% stenosis present by NASCET criteria bilaterally. The vertebral arteries   are normal in course and caliber bilaterally. Intracranially, the carotid siphons are patent. The anterior cerebral arteries are normal in course and caliber. The right and left middle cerebral arteries demonstrate no evidence of flow-limiting stenosis,   large vessel occlusion or aneurysm. The vertebral basilar system is patent. The posterior cerebral arteries are normal in course and caliber bilaterally.      Impression:      Impression:  Normal CT angiogram of the head and neck. There is no evidence of flow-limiting stenosis, large vessel occlusion or aneurysm.        Electronically Signed: Jalil Rodriguez MD    1/2/2025 11:26 PM EST    Workstation ID: [id number]    XR Chest 1 View [[id number]] Collected: 01/02/25 2314     Updated: 01/02/25 2317    Narrative:      XR CHEST 1 VW    Date of Exam: 1/2/2025 11:03 PM EST    Indication: Acute Stroke Protocol (onset < 12 hrs)    Comparison: 12/27/2023.    Findings:  There is a left subclavian AICD/pacemaker  device. There are no airspace consolidations. No pleural fluid. No pneumothorax. The pulmonary vasculature appears within normal limits. The cardiac and mediastinal silhouette appear unremarkable. No acute   osseous abnormality identified.      Impression:      Impression:  No acute cardiopulmonary process.      Electronically Signed: Kay De Leon MD    1/2/2025 11:15 PM EST    Workstation ID: [id number]    CT CEREBRAL PERFUSION WITH & WITHOUT CONTRAST [[id number]] Collected: 01/02/25 2249     Updated: 01/02/25 2252    Narrative:      CT CEREBRAL PERFUSION W WO CONTRAST    Date of Exam: 1/2/2025 10:39 PM EST    Indication: Neuro deficit, acute, stroke suspected  Neuro deficit, acute, stroke suspected.     Comparison: None available.    Technique: Axial CT images of the brain were obtained prior to and after the administration of iodinated contrast. CT Perfusion protocol was utilized. Automated post processing was performed by RAPID software and submitted to PACS for interpretation.   Automated exposure control and iterative reconstruction was utilized.      Findings:  Color maps are symmetric, without evidence of core infarct or significant territorial ischemic tissue at risk.      Impression:      Impression:  No evidence of core infarct or territorial ischemic tissue at risk.        Electronically Signed: Jalil Rodriguez MD    1/2/2025 10:50 PM EST    Workstation ID: [id number]    CT Head Without Contrast Stroke Protocol [[id number]] Collected: 01/02/25 2238     Updated: 01/02/25 2246    Narrative:      CT HEAD WO CONTRAST STROKE PROTOCOL    Date of Exam: 1/2/2025 10:31 PM EST    Indication: Neuro deficit, acute, stroke suspected  Neuro deficit, acute, stroke suspected.    Comparison: None available.    Technique: Axial CT images were obtained of the head without contrast administration.  Reconstructed coronal and sagittal images were also obtained. Automated exposure control and iterative construction methods were  used.    Scan Time: 22:29  Results discussed with Dr. Andrew at 22:40.      Findings:  No acute intracranial hemorrhage or extra-axial collection is identified. The ventricles appear normal in caliber, with no evidence of mass effect or midline shift. The basal cisterns appear patent. The gray-white differentiation appears preserved.    The calvarium appear intact. The paranasal sinuses are clear. The mastoid air cells are well-aerated.      Impression:      Impression:  No acute intracranial process identified.        Electronically Signed: Brigido Leon MD    1/2/2025 10:42 PM EST    Workstation ID: [id number]            LAB RESULTS (LAST 7 DAYS)    CBC  Results from last 7 days   Lab Units 01/03/25  0322 01/02/25  2226   WBC 10*3/mm3 9.06 9.11   RBC 10*6/mm3 4.51 4.41   HEMOGLOBIN g/dL 13.2 13.2   HEMATOCRIT % 42.3 41.9   MCV fL 93.8 95.0   PLATELETS 10*3/mm3 617* 600*       BMP  Results from last 7 days   Lab Units 01/03/25  0322 01/02/25  2226   SODIUM mmol/L 138 139   POTASSIUM mmol/L 4.1 3.8   CHLORIDE mmol/L 102 102   CO2 mmol/L 27.2 27.3   BUN mg/dL 15 16   CREATININE mg/dL 1.19 1.28*   GLUCOSE mg/dL 118* 120*       CMP   Results from last 7 days   Lab Units 01/03/25  0322 01/02/25  2226   SODIUM mmol/L 138 139   POTASSIUM mmol/L 4.1 3.8   CHLORIDE mmol/L 102 102   CO2 mmol/L 27.2 27.3   BUN mg/dL 15 16   CREATININE mg/dL 1.19 1.28*   GLUCOSE mg/dL 118* 120*   ALBUMIN g/dL 4.4 4.2   BILIRUBIN mg/dL 0.9 0.9   ALK PHOS U/L 90 101   AST (SGOT) U/L 34 32   ALT (SGPT) U/L 25 25       BNP        TROPONIN        CoAg  Results from last 7 days   Lab Units 01/02/25  2226   INR  1.11*   APTT seconds 28.5       Creatinine Clearance  Estimated Creatinine Clearance: 119.4 mL/min (by C-G formula based on SCr of 1.19 mg/dL).    ABG          Radiology  CT Angiogram Head w AI Analysis of LVO    Result Date: 1/2/2025  Impression: Normal CT angiogram of the head and neck. There is no evidence of flow-limiting stenosis,  large vessel occlusion or aneurysm. Electronically Signed: Jalil Rodriguez MD  1/2/2025 11:26 PM EST  Workstation ID: [id number]    CT Angiogram Neck    Result Date: 1/2/2025  Impression: Normal CT angiogram of the head and neck. There is no evidence of flow-limiting stenosis, large vessel occlusion or aneurysm. Electronically Signed: Jalil Rodriguez MD  1/2/2025 11:26 PM EST  Workstation ID: [id number]    XR Chest 1 View    Result Date: 1/2/2025  Impression: No acute cardiopulmonary process. Electronically Signed: Kay De Leon MD  1/2/2025 11:15 PM EST  Workstation ID: [id number]    CT CEREBRAL PERFUSION WITH & WITHOUT CONTRAST    Result Date: 1/2/2025  Impression: No evidence of core infarct or territorial ischemic tissue at risk. Electronically Signed: Jalil Rodriguez MD  1/2/2025 10:50 PM EST  Workstation ID: [id number]    CT Head Without Contrast Stroke Protocol    Result Date: 1/2/2025  Impression: No acute intracranial process identified. Electronically Signed: Brigido Leon MD  1/2/2025 10:42 PM EST  Workstation ID: [id number]       EKG  I personally viewed and interpreted the patient's EKG/Telemetry data:  ECG 12 Lead Stroke Evaluation   Preliminary Result   HEART RATE=72  bpm   RR Bzbwikhe=312  ms   AL Huyazmuf=866  ms   P Horizontal Axis=5  deg   P Front Axis=156  deg   QRSD Ylfbuelp=733  ms   QT Kkajsexh=814  ms   LOcF=023  ms   QRS Axis=74  deg   T Wave Axis=128  deg   - ABNORMAL ECG -   Ventricular-paced rhythm   Biventricular paced rhythm   Date and Time of Study:2025-01-02 22:46:00      ECG 12 Lead Stroke Evaluation    (Results Pending)         Echocardiogram:    Results for orders placed during the hospital encounter of 01/02/25    Adult Transthoracic Echo Complete W/ Cont if Necessary Per Protocol (With Agitated Saline)    Interpretation Summary    Left ventricular systolic function is normal. Calculated left ventricular EF = 51% Left ventricular ejection fraction appears to be 51 - 55%.    The left  ventricular cavity is moderately dilated.    Left ventricular diastolic function is consistent with (grade II w/high LAP) pseudonormalization.  Average GLS -15.5%.    Saline test results are negative for right to left atrial level shunt.    Estimated right ventricular systolic pressure from tricuspid regurgitation is normal (<35 mmHg).    No significant valvular abnormalities noted.        Stress Test:        Cardiac Catheterization:  Results for orders placed during the hospital encounter of 09/19/22    Cardiac Catheterization/Vascular Study    Narrative  PROCEDURE PERFORMED  Ultrasound guided vascular access  Right heart Catheterization  Left Heart Catheterization  Coronary Angiogram  Moderate Sedation    INDICATIONS FOR PROCEDURE  46-year-old man with multiple cardiovascular risk factors and morbid obesity presented with new onset of heart failure with reduced ejection fraction and was found to have pulmonary hypertension.  Risk and benefit of right and left heart cath were discussed with the patient and he was brought in for elective coronary angiogram and right heart cath.    PROCEDURE IN DETAIL  Informed consent was obtained from the patient after explaining the risks, benefits, and alternative options of the procedure. After obtaining informed consent, the patient was brought to the cath lab and was prepped in a sterile fashion. Lidocaine 1% was used for local anesthesia into the right IJ access site. Right IJ vein was accessed using the micropuncture needle under ultrasound guidance and micropuncture wire advanced under flouroscopy. A 7 [place] vascular sheath was put into place percutaneously over guide-wire. Guide wires were removed. A 7Fr swan hiram catheter was advanced to wedge position. RA, RV and PA and wedge pressures were recorded. PA sat and arterial sats recorded and the swan was subsequently removed in its entirety. Next, The right radial artery was accessed with an angiocath needle under  ultrasound guidance. A 6F slendersheath was inserted successfully.  Afterwards, 6F JR4 diagnostic catheter was advanced over a wire into the ascending aorta and was used to cross the AV and obtain LV pressures.  Gradient across the AV measured via pullback technique.  Next, 6F JL3.5 and JR4 catheters were used to engage the ostia of the left main and RCA respectively. Images of the right and left coronary systems were obtained. The catheters were exchanged over a wire and subsequently removed. The patient tolerated the procedure well without any complications. The pictures were reviewed at the end of the procedure. A TR band was applied.    CORONARY ANGIOGRAM FINDINGS:  Dominance: Right  #Left main: Left main is  A large vessel Which gives rise to the LAD left circumflex artery.  There is no angiographically significant stenosis  #Left Anterior Descending Artery: LAD is a large caliber vessel which gives rise to several septal perforators and several diagonal branches. There is no angiographically significant stenosis  #Left Circumflex: The LCx is a large, non-dominant vessel which gives rise to OM branches. It is angiographically free of disease  #Right Coronary Artery: The RCA is a large, dominant vessel which gives rise to several small caliber branches and the PDA and PLV.  Right coronary artery is angiographically free from any significant disease.      [facility] HEMODYNAMICS:  LVp 109/24, 32  AOp 104/87, 96  No significant gradient on pullback across aortic valve.    RH HEMODYNAMICS:  RA 27/25, 24  RV 52/14, 27  PA 50/30, 42  PCW 33/34, 30  AO Sat 98%  PA Sat 73 %    Leif CO 7.68    Leif CI 2.9    ESTIMATED BLOOD LOSS:  10 ml    COMPLICATIONS:  None    PROCEDURE DATA:  Contrast Used:20 cc  Sedation Time: 27 minutes 55 seconds    IMPRESSIONS  No angiographic evidence of obstructive CAD  Elevated left heart filling pressures with LVEDP of 35 mmHg  Non ischemic cardiomyopathy    RECOMMENDATIONS  -- GDMT for  cardiomyopathy  -- Increase IV diuretics  -- Risk factor and lifestyle modifications  -- Repeat echocardiogram after 3 months of GDMT        Other:      ASSESSMENT & PLAN:    Active Problems:    * No active hospital problems. *    Strokelike symptoms  CT head and CTA are essentially unremarkable.  Continue DAPT and high intensity statin  ICD is MRI compatible: Patient may proceed with brain MRI Neurology is following  Echocardiogram with no evidence of right-to-left shunt/PFO.  Will offer outpatient PALOMO    Nonischemic dilated cardiomyopathy  Diagnosed with EF of 25% which has now improved to 55%  He has grade 2 diastolic dysfunction  Continue ACE inhibitor, beta-blocker  BiV ICD in place since March 2023  Aldactone stopped due to low blood pressure  He is unable to afford Jardiance.  Emphasized the importance of diet, medication compliance, provided education regarding diuretics    Pulmonary hypertension (HCC)  Needs sleep study.  Likely secondary to sleep apnea     Obesity  BMI is 42.7.  He weighs 332 pounds.  Lifestyle modifications recommended    Ridge Lindsay MD  01/03/25  05:05 EST

## 2025-01-03 NOTE — THERAPY EVALUATION
Patient Name: Misael Carney  : 1975    MRN: [id number]                              Today's Date: 1/3/2025       Admit Date: 2025    Visit Dx:     ICD-10-CM ICD-9-CM   1. Speech disturbance, unspecified type  R47.9 784.59   2. Focal neurological deficit  R29.818 781.99     Patient Active Problem List   Diagnosis    Acute combined systolic and diastolic congestive heart failure    Class 2 obesity with alveolar hypoventilation, serious comorbidity, and body mass index (BMI) of 38.0 to 38.9 in adult    Other hyperlipidemia    Pre-diabetes    Tachycardia    Chronic duodenal ulcer without hemorrhage or perforation    Ankle effusion, left    Acute otitis media    Acquired deformity of ankle and foot    Gout    Cardiomyopathy, dilated    Nonrheumatic tricuspid valve regurgitation    Pulmonary hypertension    Presence of automatic cardioverter/defibrillator (AICD)    LBBB (left bundle branch block)    TIA (transient ischemic attack)     Past Medical History:   Diagnosis Date    Abnormal ECG 2022    CHF (congestive heart failure)     Congenital heart disease 2022    Coronary artery disease     Diabetes mellitus     LBBB (left bundle branch block) 2023    Other hyperlipidemia 2022    Peptic ulcer disease      Past Surgical History:   Procedure Laterality Date    ANKLE SURGERY Left 2016    CARDIAC CATHETERIZATION Bilateral 2022    Procedure: Right and Left Heart Cath;  Surgeon: Ridge Lindsay MD;  Location:  [facility];  Service: Cardiovascular;  Laterality: Bilateral;    CARDIAC DEFIBRILLATOR PLACEMENT  2023    CARDIAC ELECTROPHYSIOLOGY PROCEDURE N/A 2023    Procedure: BivICD Abbott aware;  Surgeon: Marci Cerda MD;  Location:  [facility];  Service: Cardiovascular;  Laterality: N/A;      General Information       Row Name 25 1312          Physical Therapy Time and Intention    Document Type evaluation  -CL     Mode of  Treatment individual therapy;physical therapy  -CL       Row Name 01/03/25 1312          General Information    Patient Profile Reviewed yes  -CL     Prior Level of Function independent:;driving;ADL's;all household mobility;community mobility;work  -CL     Existing Precautions/Restrictions no known precautions/restrictions  -CL     Barriers to Rehab none identified  -CL       Row Name 01/03/25 1312          Living Environment    People in Home alone  -CL       Row Name 01/03/25 1312          Home Main Entrance    Number of Stairs, Main Entrance none  -CL       Row Name 01/03/25 1312          Stairs Within Home, Primary    Number of Stairs, Within Home, Primary none  -CL       Row Name 01/03/25 1312          Cognition    Orientation Status (Cognition) oriented x 4  -CL               User Key  (r) = Recorded By, (t) = Taken By, (c) = Cosigned By      Initials Name Provider Type    CL Yaquelin Centeno, PT Physical Therapist                   Mobility       Row Name 01/03/25 1313          Bed Mobility    Bed Mobility bed mobility (all) activities  -CL     All Activities, [place] (Bed Mobility) independent  -CL       Row Name 01/03/25 1313          Bed-Chair Transfer    Bed-Chair [place] (Transfers) independent  -CL       Row Name 01/03/25 1313          Sit-Stand Transfer    Sit-Stand [place] (Transfers) independent  -CL       Row Name 01/03/25 1313          Gait/Stairs (Locomotion)    [place] Level (Gait) supervision  -CL     Patient was able to Ambulate yes  -CL     Distance in Feet (Gait) 100  -CL     Deviations/Abnormal Patterns (Gait) base of support, wide  -CL               User Key  (r) = Recorded By, (t) = Taken By, (c) = Cosigned By      Initials Name Provider Type    Yaquelin Nguyen PT Physical Therapist                   Obj/Interventions       Row Name 01/03/25 1313          Range of Motion Comprehensive    General Range of Motion no range of motion deficits identified  -CL        Row Name 01/03/25 1313          Strength Comprehensive (MMT)    General Manual Muscle Testing (MMT) Assessment no strength deficits identified  -CL       Row Name 01/03/25 1313          Balance    Balance Assessment sitting static balance;sitting dynamic balance;standing dynamic balance;standing static balance  -CL     Static Sitting Balance independent  -CL     Dynamic Sitting Balance independent  -CL     Position, Sitting Balance unsupported;sitting edge of bed  -CL     Static Standing Balance independent  -CL     Dynamic Standing Balance supervision  -CL     Position/Device Used, Standing Balance unsupported  -CL       Row Name 01/03/25 1313          Sensory Assessment (Somatosensory)    Sensory Assessment (Somatosensory) sensation intact  -CL               User Key  (r) = Recorded By, (t) = Taken By, (c) = Cosigned By      Initials Name Provider Type    CL Yaquelin Centeno, PT Physical Therapist                   Goals/Plan       Row Name 01/03/25 1325          Transfer Goal 1 (PT)    Activity/Assistive Device (Transfer Goal 1, PT) sit-to-stand/stand-to-sit;bed-to-chair/chair-to-bed  -CL     [place] Level/Cues Needed (Transfer Goal 1, PT) independent  -CL     Time Frame (Transfer Goal 1, PT) long term goal (LTG);2 weeks  -CL       Row Name 01/03/25 1325          Gait Training Goal 1 (PT)    Activity/Assistive Device (Gait Training Goal 1, PT) gait (walking locomotion);improve balance and speed  -CL     [place] Level (Gait Training Goal 1, PT) independent  -CL     Distance (Gait Training Goal 1, PT) 400  -CL     Time Frame (Gait Training Goal 1, PT) long term goal (LTG);2 weeks  -CL       Row Name 01/03/25 1325          Therapy Assessment/Plan (PT)    Planned Therapy Interventions (PT) balance training;gait training;neuromuscular re-education;transfer training  -CL               User Key  (r) = Recorded By, (t) = Taken By, (c) = Cosigned By      Initials Name Provider Type    CL Yaquelin Centeno, PT  Physical Therapist                   Clinical Impression       Row Name 01/03/25 1314          Pain    Pretreatment Pain Rating 2/10  -CL     Pain Side/Orientation right  -CL       Row Name 01/03/25 1314          Plan of Care Review    Plan of Care Reviewed With patient  -CL     Outcome Evaluation Misael Carney is a 49 y.o. male with hx of CHF, CAD with ICD in place who presents with mental fogginess, difficulty speaking and ambulating. CT head, cerebral profusion and CTA of head and neck were negative.  Pt found with elevated platelet count and SED rate.  He is treated for strokelike symptoms, nonischemic dilated cardiomyopathy, and pulmonary HTN.   At baseline, pt lives alone in a 1st floor apt. He is independent for all moblity and IADLs including driving and works here as a .  At time of PT evaluation, he is A&O x 4 with some word finding difficulty.  He reports feeling close to his baseline except that his head continues to feel 'fuzzy' and describes feeling like he is in 'safe mode on a computer'.  He exhibits good ROM and strength throughout and ambulates well without AD or physical assist.  He complete finger to nose and heel to shin accurately.  He may benefit from OP Speech to address cognitive processing and word finding barriers. We will continue to work with him while here but do not anticipate ongoing PT needs at discharge.  -CL       Row Name 01/03/25 1314          Therapy Assessment/Plan (PT)    Rehab Potential (PT) good  -CL     Criteria for Skilled Interventions Met (PT) yes;skilled treatment is necessary  -CL     Therapy Frequency (PT) 5 times/wk  -CL     Predicted Duration of Therapy Intervention (PT) Until discharge  -CL       Row Name 01/03/25 1314          Vital Signs    Pre Systolic BP Rehab 111  -CL     Pre Treatment Diastolic BP 78  -CL     Intra Systolic BP Rehab 119  -CL     Intra Treatment Diastolic BP 77  -CL     Post Systolic BP Rehab 122  -CL     Post Treatment  Diastolic BP 74  -CL     Pretreatment Heart Rate (beats/min) 60  -CL     Intratreatment Heart Rate (beats/min) 68  -CL     Pretreatment Resp Rate (breaths/min) 17  -CL     Intratreatment Resp Rate (breaths/min) 21  -CL     Pre SpO2 (%) 92  -CL     O2 Delivery Pre Treatment room air  -CL     Intra SpO2 (%) 92  -CL     O2 Delivery Intra Treatment room air  -CL     Post SpO2 (%) 95  -CL     O2 Delivery Post Treatment room air  -CL     Pre Patient Position Supine  -CL     Intra Patient Position Sitting  -CL     Post Patient Position Supine  -CL       Row Name 01/03/25 1314          Positioning and Restraints    Pre-Treatment Position in bed  -CL     Post Treatment Position bed  -CL     In Bed fowlers;call light within reach;encouraged to call for assist  -CL               User Key  (r) = Recorded By, (t) = Taken By, (c) = Cosigned By      Initials Name Provider Type    Yaquelin Nguyen, PT Physical Therapist                   Outcome Measures       Row Name 01/03/25 0830 01/03/25 0152       How much help from another person do you currently need...    Turning from your back to your side while in flat bed without using bedrails? 4  -ROSINA 4  -KB    Moving from lying on back to sitting on the side of a flat bed without bedrails? 4  -ROSINA 4  -KB    Moving to and from a bed to a chair (including a wheelchair)? 4  -ROSINA 4  -KB    Standing up from a chair using your arms (e.g., wheelchair, bedside chair)? 4  -ROSINA 4  -KB    Climbing 3-5 steps with a railing? 4  -ROSINA 4  -KB    To walk in hospital room? 4  -ROSINA 4  -KB    AM-PAC 6 Clicks Score (PT) 24  -ROSINA 24  -KB      Row Name 01/03/25 0140          How much help from another person do you currently need...    Turning from your back to your side while in flat bed without using bedrails? 4  -KB     Moving from lying on back to sitting on the side of a flat bed without bedrails? 4  -KB     Moving to and from a bed to a chair (including a wheelchair)? 4  -KB     Standing up from a chair  using your arms (e.g., wheelchair, bedside chair)? 4  -KB     Climbing 3-5 steps with a railing? 4  -KB     To walk in hospital room? 4  -KB     AM-PAC 6 Clicks Score (PT) 24  -KB       Row Name 01/03/25 1325          Modified [place] Scale    Pre-Stroke Modified [place] Scale 0 - No Symptoms at all.  -CL     Modified [place] Scale 1 - No significant disability despite symptoms.  Able to carry out all usual duties and activities.  -CL       Row Name 01/03/25 1325          Functional Assessment    Outcome Measure Options Modified [place]  -CL               User Key  (r) = Recorded By, (t) = Taken By, (c) = Cosigned By      Initials Name Provider Type    An Falcon, RN Registered Nurse    CL Yaquelin Centeno, PT Physical Therapist    Stephanie Robins RN Registered Nurse                                 Physical Therapy Education       Title: PT OT SLP Therapies (Done)       Topic: Physical Therapy (Done)       Point: Mobility training (Done)       Learning Progress Summary            Patient Acceptance, E,TB, VU by CL at 1/3/2025 1326                                      User Key       Initials Effective Dates Name Provider Type Discipline     03/02/22 -  Yaquelin Centeno, PT Physical Therapist PT                  PT Recommendation and Plan  Planned Therapy Interventions (PT): balance training, gait training, neuromuscular re-education, transfer training  Outcome Evaluation: Misael Carney is a 49 y.o. male with hx of CHF, CAD with ICD in place who presents with mental fogginess, difficulty speaking and ambulating. CT head, cerebral profusion and CTA of head and neck were negative.  Pt found with elevated platelet count and SED rate.  He is treated for strokelike symptoms, nonischemic dilated cardiomyopathy, and pulmonary HTN.   At baseline, pt lives alone in a 1st floor apt. He is independent for all moblity and IADLs including driving and works here as a .  At time of PT evaluation, he is A&O x  4 with some word finding difficulty.  He reports feeling close to his baseline except that his head continues to feel 'fuzzy' and describes feeling like he is in 'safe mode on a computer'.  He exhibits good ROM and strength throughout and ambulates well without AD or physical assist.  He complete finger to nose and heel to shin accurately.  He may benefit from OP Speech to address cognitive processing and word finding barriers. We will continue to work with him while here but do not anticipate ongoing PT needs at discharge.     Time Calculation:   PT Evaluation Complexity  History, PT Evaluation Complexity: 3 or more personal factors and/or comorbidities  Examination of Body Systems (PT Eval Complexity): total of 3 or more elements  Clinical Presentation (PT Evaluation Complexity): evolving  Clinical Decision Making (PT Evaluation Complexity): moderate complexity  Overall Complexity (PT Evaluation Complexity): moderate complexity     PT Charges       Row Name 01/03/25 1326             Time Calculation    Start Time 0959  -CL      Stop Time 1024  -CL      Time Calculation (min) 25 min  -CL      PT Received On 01/03/25  -CL      PT - Next Appointment 01/05/25  -CL      PT Goal Re-Cert Due Date 01/17/25  -CL         Time Calculation- PT    Total Timed Code Minutes- PT 0 minute(s)  -CL                User Key  (r) = Recorded By, (t) = Taken By, (c) = Cosigned By      Initials Name Provider Type    CL Yaquelin Centeno, PT Physical Therapist                  Therapy Charges for Today       Code Description Service Date Service Provider Modifiers Qty    [id number]  PT EVAL MOD COMPLEXITY 4 1/3/2025 Yaquelin Centeno, PT GP 1            PT G-Codes  Outcome Measure Options: Modified [place]  AM-PAC 6 Clicks Score (PT): 24  Modified [place] Scale: 1 - No significant disability despite symptoms.  Able to carry out all usual duties and activities.  PT Discharge Summary  Anticipated Discharge Disposition (PT):  home    Yaquelin Centeno, PT  1/3/2025

## 2025-01-03 NOTE — THERAPY EVALUATION
Patient Name: Misael Carney  : 1975    MRN: [id number]                              Today's Date: 1/3/2025       Admit Date: 2025    Visit Dx:     ICD-10-CM ICD-9-CM   1. Speech disturbance, unspecified type  R47.9 784.59   2. Focal neurological deficit  R29.818 781.99     Patient Active Problem List   Diagnosis    Acute combined systolic and diastolic congestive heart failure    Class 2 obesity with alveolar hypoventilation, serious comorbidity, and body mass index (BMI) of 38.0 to 38.9 in adult    Other hyperlipidemia    Pre-diabetes    Tachycardia    Chronic duodenal ulcer without hemorrhage or perforation    Ankle effusion, left    Acute otitis media    Acquired deformity of ankle and foot    Gout    Cardiomyopathy, dilated    Nonrheumatic tricuspid valve regurgitation    Pulmonary hypertension    Presence of automatic cardioverter/defibrillator (AICD)    LBBB (left bundle branch block)    TIA (transient ischemic attack)     Past Medical History:   Diagnosis Date    Abnormal ECG 2022    CHF (congestive heart failure)     Congenital heart disease 2022    Coronary artery disease     Diabetes mellitus     LBBB (left bundle branch block) 2023    Other hyperlipidemia 2022    Peptic ulcer disease      Past Surgical History:   Procedure Laterality Date    ANKLE SURGERY Left 2016    CARDIAC CATHETERIZATION Bilateral 2022    Procedure: Right and Left Heart Cath;  Surgeon: Ridge Lindsay MD;  Location:  [facility]VAS[facility];  Service: Cardiovascular;  Laterality: Bilateral;    CARDIAC DEFIBRILLATOR PLACEMENT  2023    CARDIAC ELECTROPHYSIOLOGY PROCEDURE N/A 2023    Procedure: BivICD Abbott aware;  Surgeon: Marci Cerda MD;  Location:  [facility] C[facility]VAS[facility] LOCATION;  Service: Cardiovascular;  Laterality: N/A;      General Information       Row Name 25 1517          OT Time and Intention    Subjective Information complains of;numbness  -MS     Document  Type evaluation  -MS     Mode of Treatment occupational therapy  -MS     Patient Effort excellent  -MS       Row Name 01/03/25 1517          General Information    Patient Profile Reviewed yes  -MS     Prior Level of Function independent:;ADL's;all household mobility;driving;work  -MS     Existing Precautions/Restrictions no known precautions/restrictions  -MS     Barriers to Rehab none identified  -MS       Row Name 01/03/25 1517          Occupational Profile    Reason for Services/Referral (Occupational Profile) Pt is a 48 y/o M admitted to [facility] 1/2/25 with difficulty ambulating, difficulty speaking and R handed numbness. CT head (-) acute. CT cerebral perfusion (-) acute. CTA head/neck (-) acute. MRI (-) acute. Hitesh Carotid duplex pending. PMHx significant for pacemaker in place, hx combined systolic and diastolic failure. At baseline pt resides alone in ground floor apartment. 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  -MS     Successful Occupations (Occupational Profile) security [employer] shift [facility]  -MS       Row Name 01/03/25 1517          Living Environment    People in Home alone  -MS       Row Name 01/03/25 1517          Home Main Entrance    Number of Stairs, Main Entrance none  -MS       Row Name 01/03/25 1517          Stairs Within Home, Primary    Number of Stairs, Within Home, Primary none  -MS       Row Name 01/03/25 1517          Cognition    Orientation Status (Cognition) oriented x 4  demo word finding difficulty  -MS       Row Name 01/03/25 1517          Safety Issues/Impairments Affecting Functional Mobility    Impairments Affecting Function (Mobility) sensation/sensory awareness  -MS               User Key  (r) = Recorded By, (t) = Taken By, (c) = Cosigned By      Initials Name Provider Type    MS Philomena Jimenez OT Occupational Therapist                     Mobility/ADL's       Row Name 01/03/25 1518          Bed Mobility    Bed  Mobility bed mobility (all) activities  -MS     All Activities, [place] (Bed Mobility) modified independence  -MS       Row Name 01/03/25 1518          Bed-Chair Transfer    Bed-Chair [place] (Transfers) independent  -MS       Row Name 01/03/25 1518          Sit-Stand Transfer    Sit-Stand [place] (Transfers) independent  -MS       Row Name 01/03/25 1518          Functional Mobility    Patient was able to Ambulate yes  -MS       Row Name 01/03/25 1518          Activities of Daily Living    BADL Assessment/Intervention lower body dressing;toileting  -MS       Row Name 01/03/25 1518          Lower Body Dressing Assessment/Training    [place] Level (Lower Body Dressing) don;doff;socks;modified independence  -MS       Row Name 01/03/25 1518          Toileting Assessment/Training    Comment, (Toileting) reports has been getting up to bathroom (I) while admitted  -MS               User Key  (r) = Recorded By, (t) = Taken By, (c) = Cosigned By      Initials Name Provider Type    MS Philomena Jimenez OT Occupational Therapist                   Obj/Interventions       Row Name 01/03/25 1519          Sensory Assessment (Somatosensory)    Sensory Assessment reports diminished sensation RLE and R side of face  -MS       Row Name 01/03/25 1519          Vision Assessment/Intervention    Visual Impairment/Limitations WFL  -MS       Row Name 01/03/25 1519          Range of Motion Comprehensive    General Range of Motion bilateral upper extremity ROM WNL  -MS       Row Name 01/03/25 1519          Strength Comprehensive (MMT)    General Manual Muscle Testing (MMT) Assessment no strength deficits identified  -MS       Row Name 01/03/25 1519          Balance    Balance Assessment sitting static balance;sitting dynamic balance;standing static balance;standing dynamic balance  -MS     Static Sitting Balance independent  -MS     Dynamic Sitting Balance independent  -MS     Position, Sitting Balance unsupported;sitting  "edge of bed  -MS     Static Standing Balance independent  -MS     Dynamic Standing Balance supervision  -MS     Position/Device Used, Standing Balance unsupported  -MS               User Key  (r) = Recorded By, (t) = Taken By, (c) = Cosigned By      Initials Name Provider Type    MS Philomena Jimenez, OT Occupational Therapist                   Goals/Plan    No documentation.                  Clinical Impression       Row Name 01/03/25 1520          Pain Assessment    Pretreatment Pain Rating 2/10  -MS     Posttreatment Pain Rating 2/10  -MS     Pain Location foot  -MS     Pain Side/Orientation right  -MS     Pain Management Interventions positioning techniques utilized  -MS       Row Name 01/03/25 1520          Plan of Care Review    Plan of Care Reviewed With patient  -MS     Progress no change  -MS     Outcome Evaluation Pt is a 50 y/o M admitted to [facility] 1/2/25 with difficulty ambulating, difficulty speaking and R handed numbness. MRI (-) acute. Hitesh Carotid duplex pending. PMHx significant for pacemaker in place, hx combined systolic and diastolic failure. At baseline pt resides alone in ground floor apartment. 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 Pt cleared for OT by nursing, oriented x4 on RA sitting edge of bed with PT present. Pt reports some pain in R foot, similar to plantar fascitis pain, expresses \"swollen\" feeling in R side of face and feeling \"off\" mentally and physically. Pt demo no strength/ROM discrepancies and appears to demo good balance and activity tolerane with mobility. Pt does demo some word finding difficulties and exectutive function deficits. Pt would benefit from higher level cognitive screen and may benefit from OP SLP referral to address cognitive deficits. No skilled OT intervention warranted within acute setting, will complete orders at this time. Thank you for this referral.  -MS       Row Name 01/03/25 1520       "    Therapy Assessment/Plan (OT)    Criteria for Skilled Therapeutic Interventions Met (OT) no;does not meet criteria for skilled intervention  -MS     Therapy Frequency (OT) evaluation only  -MS       Row Name 01/03/25 1520          Therapy Plan Review/Discharge Plan (OT)    Anticipated Discharge Disposition (OT) home  -MS       Row Name 01/03/25 1520          Vital Signs    Pre Systolic BP Rehab 111  -MS     Pre Treatment Diastolic BP 78  -MS     Intra Systolic BP Rehab 119  -MS     Intra Treatment Diastolic BP 77  -MS     Post Systolic BP Rehab 122  -MS     Post Treatment Diastolic BP 74  -MS     Pretreatment Heart Rate (beats/min) 60  -MS     Intratreatment Heart Rate (beats/min) 68  -MS     Pretreatment Resp Rate (breaths/min) 17  -MS     Intratreatment Resp Rate (breaths/min) 21  -MS     Pre SpO2 (%) 92  -MS     O2 Delivery Pre Treatment room air  -MS     Intra SpO2 (%) 92  -MS     O2 Delivery Intra Treatment room air  -MS     Post SpO2 (%) 95  -MS     O2 Delivery Post Treatment room air  -MS     Pre Patient Position Sitting  -MS     Intra Patient Position Standing  -MS     Post Patient Position Supine  -MS       Row Name 01/03/25 1520          Positioning and Restraints    Pre-Treatment Position in bed  -MS     Post Treatment Position bed  -MS     In Bed notified nsg;supine;call light within reach;encouraged to call for assist  -MS               User Key  (r) = Recorded By, (t) = Taken By, (c) = Cosigned By      Initials Name Provider Type    Philomena Mcguire, OT Occupational Therapist                   Outcome Measures       Row Name 01/03/25 1524          How much help from another is currently needed...    Putting on and taking off regular lower body clothing? 4  -MS     Bathing (including washing, rinsing, and drying) 4  -MS     Toileting (which includes using toilet bed pan or urinal) 4  -MS     Putting on and taking off regular upper body clothing 4  -MS     Taking care of personal grooming (such as  brushing teeth) 4  -MS     Eating meals 4  -MS     AM-PAC 6 Clicks Score (OT) 24  -MS       Row Name 01/03/25 0830          How much help from another person do you currently need...    Turning from your back to your side while in flat bed without using bedrails? 4  -ROSINA     Moving from lying on back to sitting on the side of a flat bed without bedrails? 4  -ROSINA     Moving to and from a bed to a chair (including a wheelchair)? 4  -ROSINA     Standing up from a chair using your arms (e.g., wheelchair, bedside chair)? 4  -ROSINA     Climbing 3-5 steps with a railing? 4  -ROSINA     To walk in hospital room? 4  -ROSINA     AM-PAC 6 Clicks Score (PT) 24  -ROSINA       Row Name 01/03/25 1325          Modified Sol Scale    Pre-Stroke Modified Sol Scale 0 - No Symptoms at all.  -CL     Modified Sol Scale 1 - No significant disability despite symptoms.  Able to carry out all usual duties and activities.  -CL       Row Name 01/03/25 1524 01/03/25 1325       Functional Assessment    Outcome Measure Options AM-PAC 6 Clicks Daily Activity (OT)  -MS Modified Sol  -CL              User Key  (r) = Recorded By, (t) = Taken By, (c) = Cosigned By      Initials Name Provider Type    An Falcon, RN Registered Nurse    Philomena Mcguire, OT Occupational Therapist    CL Yaquelin Centeno, PT Physical Therapist                    Occupational Therapy Education       Title: PT OT SLP Therapies (Done)       Topic: Occupational Therapy (Done)       Point: ADL training (Done)       Description:   Instruct learner(s) on proper safety adaptation and remediation techniques during self care or transfers.   Instruct in proper use of assistive devices.                  Learning Progress Summary            Patient Acceptance, E,TB, VU by MS at 1/3/2025 1524                      Point: Precautions (Done)       Description:   Instruct learner(s) on prescribed precautions during self-care and functional transfers.                  Learning Progress  "Summary            Patient Acceptance, E,TB, VU by MS at 1/3/2025 1524                      Point: Body mechanics (Done)       Description:   Instruct learner(s) on proper positioning and spine alignment during self-care, functional mobility activities and/or exercises.                  Learning Progress Summary            Patient Acceptance, E,TB, VU by MS at 1/3/2025 1524                                      User Key       Initials Effective Dates Name Provider Type Discipline    MS 07/13/22 -  Philomena Jimenez OT Occupational Therapist OT                  OT Recommendation and Plan  Therapy Frequency (OT): evaluation only  Plan of Care Review  Plan of Care Reviewed With: patient  Progress: no change  Outcome Evaluation: Pt is a 50 y/o M admitted to [facility] 1/2/25 with difficulty ambulating, difficulty speaking and R handed numbness. MRI (-) acute. Hitesh Carotid duplex pending. PMHx significant for pacemaker in place, hx combined systolic and diastolic failure. At baseline pt resides alone in ground floor apartment. 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 Pt cleared for OT by nursing, oriented x4 on RA sitting edge of bed with PT present. Pt reports some pain in R foot, similar to plantar fascitis pain, expresses \"swollen\" feeling in R side of face and feeling \"off\" mentally and physically. Pt demo no strength/ROM discrepancies and appears to demo good balance and activity tolerane with mobility. Pt does demo some word finding difficulties and exectutive function deficits. Pt would benefit from higher level cognitive screen and may benefit from OP SLP referral to address cognitive deficits. No skilled OT intervention warranted within acute setting, will complete orders at this time. Thank you for this referral.     Time Calculation:                   Philomena Jimenez OT  1/3/2025  "

## 2025-01-03 NOTE — ED PROVIDER NOTES
Subjective   History of Present Illness  Chief complaint: Patient is a 49-year-old who works as a  here at the emergency department.  He drove himself into work.  He went to bed today around 3 PM.  That was his last normal.  Since he has awoken he has had difficulty with ambulating and difficulty speaking and thinking clearly.  He does have a pacemaker in place and has a history of combined systolic and diastolic failure.  He is not sure if he is on a blood thinner or not.  He is having significant difficulty verbalizing his thoughts at this point in time.    Context:    Duration:    Timing:    Severity:    Associated Symptoms:        PCP:  LMP:      Review of Systems   Unable to perform ROS: Acuity of condition       Past Medical History:   Diagnosis Date    Abnormal ECG 9/19/2022    CHF (congestive heart failure)     Congenital heart disease 9/19/2022    Coronary artery disease     Diabetes mellitus     LBBB (left bundle branch block) 04/04/2023    Other hyperlipidemia 09/19/2022    Peptic ulcer disease        Allergies   Allergen Reactions    Nsaids GI Intolerance       Past Surgical History:   Procedure Laterality Date    ANKLE SURGERY Left 2016    CARDIAC CATHETERIZATION Bilateral 09/21/2022    Procedure: Right and Left Heart Cath;  Surgeon: Ridge Lindsay MD;  Location: [facility] CATH INVASIVE LOCATION;  Service: Cardiovascular;  Laterality: Bilateral;    CARDIAC DEFIBRILLATOR PLACEMENT  03/02/2023    CARDIAC ELECTROPHYSIOLOGY PROCEDURE N/A 03/02/2023    Procedure: BivICD Abbott aware;  Surgeon: Marci Cerda MD;  Location:  [facility] CATH INVASIVE LOC[facility];  Service: Cardiovascular;  Laterality: N/A;       Family History   Problem Relation Age of Onset    Asthma Mother     Heart attack Father     Heart disease Father     Heart failure Father     Hypertension Father        Social History     Socioeconomic History    Marital status: Single   Tobacco Use    Smoking status: Never    Smokeless tobacco:  Never   Vaping Use    Vaping status: Never Used   Substance and Sexual Activity    Alcohol use: Not Currently     Comment: occ    Drug use: Never    Sexual activity: Yes     Partners: Female     Birth control/protection: Condom, Natural family planning/Rhythm, Rhythm           Objective   Physical Exam  Vitals and nursing note reviewed.   Constitutional:       Appearance: He is ill-appearing.   HENT:      Head: Normocephalic and atraumatic.   Cardiovascular:      Rate and Rhythm: Normal rate.      Heart sounds: Normal heart sounds.   Pulmonary:      Effort: Pulmonary effort is normal.   Abdominal:      Tenderness: There is no abdominal tenderness.   Musculoskeletal:      Cervical back: Normal range of motion.   Skin:     General: Skin is warm and dry.   Neurological:      Mental Status: He is alert.      Sensory: No sensory deficit.      Motor: No weakness.      Coordination: Coordination abnormal.      Comments: Patient appears to have difficulty with speech and aphasia         Procedures           ED Course  ED Course as of 01/02/25 2227   Thu Jan 02, 2025 2227 Patient evaluated at bedside.  This was on direct arrival.  Team the.  Code stroke was initiated.  Spoke with Dr. Black.  Patient is not a TNK candidate.  Last known well 3 PM. [LH]      ED Course User Index  [LH] Mitchell Andrew DO                                           Results for orders placed or performed during the hospital encounter of 01/02/25   Comprehensive Metabolic Panel    Collection Time: 01/02/25 10:26 PM    Specimen: Blood   Result Value Ref Range    Glucose 120 (H) 65 - 99 mg/dL    BUN 16 6 - 20 mg/dL    Creatinine 1.28 (H) 0.76 - 1.27 mg/dL    Sodium 139 136 - 145 mmol/L    Potassium 3.8 3.5 - 5.2 mmol/L    Chloride 102 98 - 107 mmol/L    CO2 27.3 22.0 - 29.0 mmol/L    Calcium 9.1 8.6 - 10.5 mg/dL    Total Protein 7.9 6.0 - 8.5 g/dL    Albumin 4.2 3.5 - 5.2 g/dL    ALT (SGPT) 25 1 - 41 U/L    AST (SGOT) 32 1 - 40 U/L     Alkaline Phosphatase 101 39 - 117 U/L    Total Bilirubin 0.9 0.0 - 1.2 mg/dL    Globulin 3.7 gm/dL    A/G Ratio 1.1 g/dL    BUN/Creatinine Ratio 12.5 7.0 - 25.0    Anion Gap 9.7 5.0 - 15.0 mmol/L    eGFR 69.5 >60.0 mL/min/1.73   Protime-INR    Collection Time: 01/02/25 10:26 PM    Specimen: Blood   Result Value Ref Range    Protime 14.4 (H) 11.7 - 14.2 Seconds    INR 1.11 (H) 0.90 - 1.10   aPTT    Collection Time: 01/02/25 10:26 PM    Specimen: Blood   Result Value Ref Range    PTT 28.5 22.7 - 35.4 seconds   CBC Auto Differential    Collection Time: 01/02/25 10:26 PM    Specimen: Blood   Result Value Ref Range    WBC 9.11 3.40 - 10.80 10*3/mm3    RBC 4.41 4.14 - 5.80 10*6/mm3    Hemoglobin 13.2 13.0 - 17.7 g/dL    Hematocrit 41.9 37.5 - 51.0 %    MCV 95.0 79.0 - 97.0 fL    MCH 29.9 26.6 - 33.0 pg    MCHC 31.5 31.5 - 35.7 g/dL    RDW 13.5 12.3 - 15.4 %    RDW-SD 47.5 37.0 - 54.0 fl    MPV 9.1 6.0 - 12.0 fL    Platelets 600 (H) 140 - 450 10*3/mm3    Neutrophil % 65.9 42.7 - 76.0 %    Lymphocyte % 19.3 (L) 19.6 - 45.3 %    Monocyte % 10.2 5.0 - 12.0 %    Eosinophil % 3.7 0.3 - 6.2 %    Basophil % 0.7 0.0 - 1.5 %    Immature Grans % 0.2 0.0 - 0.5 %    Neutrophils, Absolute 6.00 1.70 - 7.00 10*3/mm3    Lymphocytes, Absolute 1.76 0.70 - 3.10 10*3/mm3    Monocytes, Absolute 0.93 (H) 0.10 - 0.90 10*3/mm3    Eosinophils, Absolute 0.34 0.00 - 0.40 10*3/mm3    Basophils, Absolute 0.06 0.00 - 0.20 10*3/mm3    Immature Grans, Absolute 0.02 0.00 - 0.05 10*3/mm3    nRBC 0.0 0.0 - 0.2 /100 WBC   Type & Screen    Collection Time: 01/02/25 10:26 PM    Specimen: Blood   Result Value Ref Range    ABO Type O     RH type Positive     Antibody Screen Negative     T&S Expiration Date 1/5/2025 11:59:59 PM    Green Top (Gel)    Collection Time: 01/02/25 10:26 PM   Result Value Ref Range    Extra Tube Hold for add-ons.    Lavender Top    Collection Time: 01/02/25 10:26 PM   Result Value Ref Range    Extra Tube hold for add-on    Gold Top -  SST    Collection Time: 01/02/25 10:26 PM   Result Value Ref Range    Extra Tube Hold for add-ons.    Light Blue Top    Collection Time: 01/02/25 10:26 PM   Result Value Ref Range    Extra Tube Hold for add-ons.    ECG 12 Lead Stroke Evaluation    Collection Time: 01/02/25 10:46 PM   Result Value Ref Range    QT Interval 472 ms    QTC Interval 517 ms            CT Angiogram Head w AI Analysis of LVO    Result Date: 1/2/2025  Impression: Normal CT angiogram of the head and neck. There is no evidence of flow-limiting stenosis, large vessel occlusion or aneurysm. Electronically Signed: Jalil Rodriguez MD  1/2/2025 11:26 PM EST  Workstation ID: [id number]    CT Angiogram Neck    Result Date: 1/2/2025  Impression: Normal CT angiogram of the head and neck. There is no evidence of flow-limiting stenosis, large vessel occlusion or aneurysm. Electronically Signed: Jalil Rodriguez MD  1/2/2025 11:26 PM EST  Workstation ID: [id number]    XR Chest 1 View    Result Date: 1/2/2025  Impression: No acute cardiopulmonary process. Electronically Signed: Kay De Leon MD  1/2/2025 11:15 PM EST  Workstation ID: [id number]    CT CEREBRAL PERFUSION WITH & WITHOUT CONTRAST    Result Date: 1/2/2025  Impression: No evidence of core infarct or territorial ischemic tissue at risk. Electronically Signed: Jalil Rodriguez MD  1/2/2025 10:50 PM EST  Workstation ID: [id number]    CT Head Without Contrast Stroke Protocol    Result Date: 1/2/2025  Impression: No acute intracranial process identified. Electronically Signed: Brigido Leon MD  1/2/2025 10:42 PM EST  Workstation ID: [id number]          Medical Decision Making  Patient was seen and evaluated for the above problem    Differential diagnosis includes was not limited to headache, migraine, stroke, intracerebral hemorrhage    Patient was having difficulty with balance in the room.  Also with speech.  Code stroke was initiated as his symptoms were outside of 4-1/2 hours however, less than 12.    did evaluate the patient here.  He put orders and recommendations in.  Patient does have a pacemaker and will have to get an MRI in the morning.  EKG is a paced rhythm.  However poor study with baseline lead II not showing.  Have ordered repeat study.  CT perfusion study and CTAs were negative.  No transfer for any emergent retrieval as there is no clot burden present.  I spoke with Domenica on-call for Dr. La who agrees to the patient.    Problems Addressed:  Focal neurological deficit: complicated acute illness or injury  Speech disturbance, unspecified type: complicated acute illness or injury    Amount and/or Complexity of Data Reviewed  Labs: ordered. Decision-making details documented in ED Course.     Details: Labs reviewed by myself  Radiology: ordered and independent interpretation performed.     Details: CTs reviewed by myself as above  ECG/medicine tests: ordered and independent interpretation performed.     Details: EKG interpreted by myself ventricular paced rhythm poor baseline in lead II.  72    Risk  Prescription drug management.  Decision regarding hospitalization.        Final diagnoses:   None   Difficulty speaking  Focal neurologic deficit    ED Disposition  ED Disposition       None            No follow-up provider specified.       Medication List      No changes were made to your prescriptions during this visit.            Mitchell Andrew DO  01/03/25 0002

## 2025-01-03 NOTE — PLAN OF CARE
Goal Outcome Evaluation:  Plan of Care Reviewed With: patient        Progress: improving        Patient discharging home today, questions and concerns answered.      Problem: Adult Inpatient Plan of Care  Goal: Plan of Care Review  Outcome: Progressing  Flowsheets (Taken 1/3/2025 1723)  Progress: improving  Plan of Care Reviewed With: patient  Goal: Patient-Specific Goal (Individualized)  Outcome: Progressing  Goal: Absence of Hospital-Acquired Illness or Injury  Outcome: Progressing  Intervention: Identify and Manage Fall Risk  Recent Flowsheet Documentation  Taken 1/3/2025 1600 by An Phillips RN  Safety Promotion/Fall Prevention: safety round/check completed  Taken 1/3/2025 1400 by An Phillips RN  Safety Promotion/Fall Prevention: patient off unit  Taken 1/3/2025 1137 by An Phillips RN  Safety Promotion/Fall Prevention: safety round/check completed  Taken 1/3/2025 1000 by An Phillips RN  Safety Promotion/Fall Prevention: safety round/check completed  Taken 1/3/2025 0830 by An Phillips RN  Safety Promotion/Fall Prevention: safety round/check completed  Goal: Optimal Comfort and Wellbeing  Outcome: Progressing  Intervention: Provide Person-Centered Care  Recent Flowsheet Documentation  Taken 1/3/2025 0830 by An Phillips RN  Trust Relationship/Rapport:   care explained   questions answered   questions encouraged   reassurance provided  Goal: Readiness for Transition of Care  Outcome: Progressing     Problem: Comorbidity Management  Goal: Blood Glucose Level Within Target Range  Outcome: Progressing  Goal: Maintenance of Heart Failure Symptom Control  Outcome: Progressing  Goal: Blood Pressure in Desired Range  Outcome: Progressing

## 2025-01-03 NOTE — CONSULTS
[facility]   Teleneurology Note    Patient Name: Misael Carney  : 1975  MRN: [id number]  Primary Care Physician: Jarod Zuleta PA-C  Referring Site: [facility]   Teleneurology Initial Data           Neurologist Evaluation Date: 25 Neurologist Evaluation Time:    Date Last Known Well: 25 Time Last Known Well: 1500     History     Chief Complaint: Woke up at 3 PM and noticed headache along with pressured speech and feeling off balance.  HPI: 49-year-old right-handed -American man who has a history of dilated cardiomyopathy and has an Abbott automatic intracardiac defibrillator implanted in  currently on aspirin woke up at 3 PM and noticed that he was off balance and he was having a pressured speech along with headache.  At the same time he was complaining of numbness in the right side compared to the left.  He was brought in as a stroke alert and I was contacted at 10:25 PM and by the time the neuroimaging studies had been completed it was 10:40 PM before I could see him on the telemedicine device.  I noticed there was no facial asymmetry or visual changes.  He could tell me his name and date of birth but it was a pressured speech he was also complaining of a headache.  No weakness or drift was noticeable in the upper and lower extremities and he was complaining of numbness in the right side of the face and right upper and lower extremities compared to the left.  No facial asymmetry was noticeable.  No visual changes were noticeable.  He followed one-step two-step and three-step commands well.  No dysmetria on finger-to-nose or heel-to-shin testing noted.  He was able to get out of the bed and stand up independently and the Romberg was negative.  The NIH stroke scale was 2.  As the patient woke up from sleep at 3 PM and the last known normal was not clear patient was not a candidate for any acute thrombolytic therapy.  The CT angiogram did not show any evidence  of any retrievable thrombus.  Patient will be admitted to the hospitalist service here with stroke workup.  He passed bedside swallow.    Stroke Risk Factors/ Pertinent Data     Stroke risk factors: dyslipidemia, coronary artery disease, myocardial infarction, hypertension  Anticoagulants prior to arrival: none  Antiplatelets prior to arrival: aspirin  Statins prior to arrival: atorvastatin (Lipitor)     Scoring Scales     Modified Sol Scale  Pre-Stroke Modified Reva Scale: 0 - No Symptoms at all.  Intracerebral Hemmorhage (ICH) Score  [place] Coma Score: 13-15  Age>=80: no  [place] Coma Scale  Best Eye Response: Spontaneous  Best Verbal Response: Oriented  Best Motor Response: Follows commands  [place] Coma Scale Score: 15    NIH Stroke Scale     NIHSS Performed Date: 01/02/25 NIHSS Performed Time: 2240   Interval: baseline  1a. Level of Consciousness: 0-->Alert, keenly responsive  1b. LOC Questions: 0-->Answers both questions correctly  1c. LOC Commands: 0-->Performs both tasks correctly  2. Best Gaze: 0-->Normal  3. Visual: 0-->No visual loss  4. Facial Palsy: 0-->Normal symmetrical movements  5a. Motor Arm, Left: 0-->No drift, limb holds 90 (or 45) degrees for full 10 secs  5b. Motor Arm, Right: 0-->No drift, limb holds 90 (or 45) degrees for full 10 secs  6a. Motor Leg, Left: 0-->No drift, leg holds 30 degree position for full 5 secs  6b. Motor Leg, Right: 0-->No drift, leg holds 30 degree position for full 5 secs  7. Limb Ataxia: 0-->Absent  8. Sensory: 1-->Mild-to-moderate sensory loss, patient feels pinprick is less sharp or is dull on the affected side, or there is a loss of superficial pain with pinprick, but patient is aware of being touched  9. Best Language: 0-->No aphasia, normal  10. Dysarthria: 1-->Mild-to-moderate dysarthria, patient slurs at least some words and, at worst, can be understood with some difficulty  11. Extinction and Inattention (formerly Neglect): 0-->No abnormality  Total (NIH  Stroke Scale): 2     Review of Systems     Review of Systems   Constitutional: Negative.    Eyes: Negative.    Respiratory: Negative.     Cardiovascular: Negative.    Gastrointestinal: Negative.    Endocrine: Negative.    Genitourinary: Negative.    Musculoskeletal: Negative.    Skin: Negative.    Allergic/Immunologic: Negative.    Neurological:  Positive for dizziness, speech difficulty, numbness and headaches.   Hematological: Negative.    Psychiatric/Behavioral: Negative.       Evaluation by Dr. Ridge Lindsay, cardiologist on August 28, 2024 is as follows:    48 y.o. male who was recently 9/22 admitted to the hospital due to complaints of difficulty breathing, dyspnea on exertion.  He works as a  and recently noted symptoms of difficulty breathing and felt like he had pneumonia without any fever or chills.  Lifting more than 5 pounds would make him out of breath and he could only walk up to 5-10 steps without getting severe shortness of breath.  The symptoms were associated with midsternal chest pain.  He denies orthopnea or PND but does report recent weight gain of at least 10 pounds in the last 2 weeks.  He does not have sleep apnea but he has not had any tests.  While in the emergency room his troponin was negative, proBNP was elevated 4500, renal function was normal and D-dimer was -0.52.  His ECG showed new left bundle branch block when compared to his previous ECG from 2017.  His echocardiogram showed significantly reduced LV function with EF of 24%, moderate pulmonary hypertension.  Cardiac catheterization ruled out any obstructive coronary disease however his LVEDP was 35 mmHg  He was treated with IV diuretics which were later switched to oral.  During the hospitalization his weight decreased from 312 pounds to 294 pounds.  Repeat echocardiogram in January 2023 shows EF of 27%.  BiV ICD placement by Dr. Hernandez in March 2023.  Device check in the office shows well-functioning [facility] device.   No VT or VF episodes detected.  0% AT/AF burden.  5.8 years of battery life remaining     Today he comes in feeling well.  Current cardiac medications include aspirin, statin, Bumex, Coreg, lisinopril.   He is not NYHA class I-II.  Recently went hiking in [place] with no problems.        The following portions of the patient's history were reviewed and updated as appropriate: allergies, current medications, past family history, past medical history, past social history, past surgical history, and problem list.     Review of Systems   Constitutional: Positive for malaise/fatigue.   Cardiovascular:  Positive for leg swelling. Negative for chest pain and palpitations.   Respiratory:  Negative for shortness of breath.    Skin:  Negative for rash.   Neurological:  Negative for dizziness, light-headedness and numbness.           Current Medications      Current Outpatient Medications:     aspirin 81 MG EC tablet, Take 1 tablet by mouth Daily., Disp: 30 tablet, Rfl: 0    atorvastatin (LIPITOR) 40 MG tablet, Take 1 tablet by mouth Every Night., Disp: 90 tablet, Rfl: 3    bumetanide (BUMEX) 1 MG tablet, Take 1 tablet by mouth 2 (Two) Times a Day., Disp: 180 tablet, Rfl: 3    carvedilol (COREG) 12.5 MG tablet, Take 1 tablet by mouth 2 (Two) Times a Day With Meals., Disp: 180 tablet, Rfl: 3    fluticasone (Flonase) 50 MCG/ACT nasal spray, instill 2 sprays into the nostril(s) daily (Patient taking differently: 2 sprays into the nostril(s) as directed by provider Daily As Needed.), Disp: 16 g, Rfl: 0    lisinopril (PRINIVIL,ZESTRIL) 10 MG tablet, Take 1 tablet by mouth Daily., Disp: 90 tablet, Rfl: 3        Current outpatient and discharge medications have been reconciled for the patient.  Reviewed by: Ridge Lindsay MD        Allergies        Allergies   Allergen Reactions    Nsaids GI Intolerance                  Family History   Problem Relation Age of Onset    Asthma Mother      Heart attack Father      Heart disease Father  "     Heart failure Father      Hypertension Father           Surgical History         Past Surgical History:   Procedure Laterality Date    ANKLE SURGERY Left 2016    CARDIAC CATHETERIZATION Bilateral 09/21/2022     Procedure: Right and Left Heart Cath;  Surgeon: Ridge Lindsay MD;  Location: [facility] CATH INVASIVE LOCATION;  Service: Cardiovascular;  Laterality: Bilateral;    CARDIAC DEFIBRILLATOR PLACEMENT   03/02/2023    CARDIAC ELECTROPHYSIOLOGY PROCEDURE N/A 03/02/2023     Procedure: BivICD Abbott aware;  Surgeon: Mraci Cerda MD;  Location:  [facility] CATH INVASIVE LOCATION;  Service: Cardiovascular;  Laterality: N/A;            Medical History        Past Medical History:   Diagnosis Date    Abnormal ECG 9/19/2022    CHF (congestive heart failure)      Congenital heart disease 9/19/2022    Coronary artery disease      Diabetes mellitus      LBBB (left bundle branch block) 04/04/2023    Other hyperlipidemia 09/19/2022    Peptic ulcer disease                    Family History   Problem Relation Age of Onset    Asthma Mother      Heart attack Father      Heart disease Father      Heart failure Father      Hypertension Father           Social History   Social History            Socioeconomic History    Marital status: Single   Tobacco Use    Smoking status: Never    Smokeless tobacco: Never   Vaping Use    Vaping status: Never Used   Substance and Sexual Activity    Alcohol use: Not Currently       Comment: occ    Drug use: Never    Sexual activity: Yes       Partners: Female       Birth control/protection: Condom, Natural family planning/Rhythm, Rhythm                        Objective:         Physical Exam     /76   Pulse 60   Ht 188 cm (74\")   Wt (!) 143 kg (316 lb)   SpO2 95%   BMI 40.57 kg/m²   The patient is alert, oriented and in no distress.     Vital signs as noted above.     Head and neck revealed no carotid bruits or jugular venous distension.  No thyromegaly or lymphadenopathy is present.   "   Lungs clear.  No wheezing.  Breath sounds are normal bilaterally.     Heart normal first and second heart sounds.  No murmur..  No pericardial rub is present.  No gallop is present.     Abdomen soft and nontender.  No organomegaly is present.     Extremities revealed good peripheral pulses without any pedal edema.     Skin warm and dry.     Musculoskeletal system is grossly normal.     CNS grossly normal.                Diagnosis Plan   1. Cardiomyopathy, dilated          2. Acute combined systolic and diastolic congestive heart failure          3. Presence of automatic cardioverter/defibrillator (AICD)          4. LBBB (left bundle branch block)          5. Tachycardia          6. Class 2 obesity with alveolar hypoventilation, serious comorbidity, and body mass index (BMI) of 38.0 to 38.9 in adult          7. Pre-diabetes          8. Other hyperlipidemia          9. Pulmonary hypertension          10. Nonrheumatic tricuspid valve regurgitation          11. Chronic combined systolic and diastolic CHF (congestive heart failure)          Objective   Exam     Exam performed with the help of support staff from the referring site  Neurological Exam  Mental Status  Awake, alert and oriented to person, place and time. Oriented to person, place, time and situation. Recent and remote memory are intact. dysarthria present. Language is fluent with no aphasia. Attention and concentration are normal. Fund of knowledge is appropriate for level of education.  Has pressured speech.    Cranial Nerves  CN I: Sense of smell is normal.  CN II: Visual acuity is normal. Visual fields full to confrontation.  CN III, IV, VI: Extraocular movements intact bilaterally. Normal lids and orbits bilaterally. Pupils equal round and reactive to light bilaterally.  CN V: Facial sensation is normal.  CN VII: Full and symmetric facial movement.  CN IX, X: Palate elevates symmetrically. Normal gag reflex.  CN XI: Shoulder shrug strength is  normal.  CN XII: Tongue midline without atrophy or fasciculations.    Motor  Normal muscle bulk throughout.    Sensory  Right hemisensory loss.     Coordination    Finger-to-nose, rapid alternating movements and heel-to-shin normal bilaterally without dysmetria.    Gait  Casual gait is normal including stance, stride, and arm swing. Romberg is absent.    He passed his bedside swallow  Result Review    Results     CT ANGIOGRAM HEAD W AI ANALYSIS OF LVO, CT ANGIOGRAM NECK    Date of Exam: 1/2/2025 10:48 PM EST    Indication: Stroke, follow up  Neuro deficit, acute, stroke suspected  Acute Stroke.    Comparison: None available.   ...   Impression   Impression:  Normal CT angiogram of the head and neck. There is no evidence of flow-limiting stenosis, large vessel occlusion or aneurysm.      CT CEREBRAL PERFUSION W WO CONTRAST     Date of Exam: 1/2/2025 10:39 PM EST     Indication: Neuro deficit, acute, stroke suspected  Neuro deficit, acute, stroke suspected.     Comparison: None available.     Technique: Axial CT images of the brain were obtained prior to and after the administration of iodinated contrast. CT Perfusion protocol was utilized. Automated post processing was performed by RAPID software and submitted to PACS for interpretation.   Automated exposure control and iterative reconstruction was utilized.        Findings:  Color maps are symmetric, without evidence of core infarct or significant territorial ischemic tissue at risk.     IMPRESSION:  Impression:  No evidence of core infarct or territorial ischemic tissue at risk.    CT HEAD WO CONTRAST STROKE PROTOCOL     Date of Exam: 1/2/2025 10:31 PM EST     Indication: Neuro deficit, acute, stroke suspected  Neuro deficit, acute, stroke suspected.     Comparison: None available.     Technique: Axial CT images were obtained of the head without contrast administration.  Reconstructed coronal and sagittal images were also obtained. Automated exposure control and  iterative construction methods were used.     Scan Time: 22:29  Results discussed with Dr. Andrew at 22:40.        Findings:  No acute intracranial hemorrhage or extra-axial collection is identified. The ventricles appear normal in caliber, with no evidence of mass effect or midline shift. The basal cisterns appear patent. The gray-white differentiation appears preserved.     The calvarium appear intact. The paranasal sinuses are clear. The mastoid air cells are well-aerated.     IMPRESSION:  Impression:  No acute intracranial process identified.    Personal review of CNS imaging:(Official report by radiologist pending)  Imaging  CT Imaging Review: CT Imaging reviewed, NO acute infarct/ hemorrhage seen  CTA Imaging Review: CTA Imaging reviewed, NO large vessel occlusion or severe stenosis seen  CT Perfusion Review: CT perfusion reviewed, NORMAL    Thrombolytic   Thrombolytics: not applicable     Assessment & Plan   Assessment/ Plan     Assessment:  Acute Stroke Evaluation: Suspected TIA or non disabling stroke- the risks of IV thrombolytic outweigh the benefits of treatment       Plan:    Patient admitted to the hospitalist service on cardiac telemetry monitoring.  Because he woke up with these symptoms he was not a candidate for any acute thrombolytic therapy and the NIH stroke scale is only 2 which is nondisabling symptoms.  Allow permissive hypertension to keep the systolic blood pressure between 1 60-1 80 and diastolic between 80-90.  As he passed the bedside swallow he will be on a cardiac diet.  He will be on baby aspirin along with a loading dose of Plavix 300 mg followed by baby aspirin and Plavix 75 mg daily for the next 21 days and then baby aspirin alone.  He will be on Lipitor 80 mg daily at night.  Lab work for tomorrow will include lipid profile, TSH, sed rate, hemoglobin A1c, vitamin B12 and folic acid levels.  I have requested the nursing staff to interrogate his device to see if it is MRI  conditional or not and then an MRI of the brain with a stroke protocol can be performed tomorrow once the device can be placed in a safe mode.  Transthoracic echocardiogram with bubble study for tomorrow to look for any PFO.  Physical therapy/Occupational Therapy/speech pathology will evaluate the patient tomorrow to make sure he would be safe to go home independently with his family.  He will be followed up tomorrow by the inpatient neurohospitalist team.       Disposition     Disposition: The patient will remain at the referring institution for further evaluation and management    Medical Decision Making  Medical Data Reviewed: Data reviewed including: clinical labs, radiology and/or medical tests, Obtaining/ reviewing old medical records, Obtaining case history from another source, Independent review of CNS images  Length of visit: 30 minutes in this critical care evaluation and management of acute stroke symptoms in the emergency room setting    Neda BATISTA MD, saw the patient on 01/02/25 at 2240 for an initial in-patient or emergency room telememedicine face to face consult using interactive technology for 30 minutes. The location of the patient was [place]. I was located at [facility]  .    I have proceeded with this evaluation at the request of the referring practitioner as it is felt to be an emergency setting and no appropriate specialist is available to perform this evaluation. The originating hospital has reported that this is the correct patient and has obtained consent from the patient/surrogate to perform this telemedicine evaluation(including obtaining history, performing examination and reviewing data provided by the patient an/or originating site of care provider)    I have introduced myself to the patient, provided my credentials, disclosed my location, and determined that, based on review of the patient's chart and discussion with the patient's primary team, telemedicine via a  HIPAA compliant, real-time, face-to-face two-way, interactive audio and video platform is an appropriate and effective means of providing the service.    The patient/surrogate has a right to refuse this evaluation as they have been explained risks including potential loss of confidentiality, benefits, alternatives, and the potential need for subsequent face-to-face care. In this evaluation, we will be providing recommendations only.  The ultimate decision to follow or not to follow these recommendations will be left to the bedside treating/requesting practitioner.    The patient/surrogate has been notified that other healthcare professionals including technical person may be involved in this A/V evaluation.  All laws concerning confidentiality and patient access to medical records and copies of medical records apply to telemedicine.  The patient/surrogate has received the originating site's Health Notice of Privacy Practices.    Neda Black MD

## 2025-01-04 NOTE — OUTREACH NOTE
Prep Survey      Flowsheet Row Responses   Nondenominational facility patient discharged from? [facility]   Is LACE score < 7 ? No   Eligibility Readm Mgmt   Discharge diagnosis TIA (transient ischemic attack)   Does the patient have one of the following disease processes/diagnoses(primary or secondary)? Stroke   Prep survey completed? Yes            Kellee FITZGERALD - Registered Nurse

## 2025-01-06 LAB — GLUCOSE BLDC GLUCOMTR-MCNC: 129 MG/DL (ref 70–105)

## 2025-01-08 ENCOUNTER — READMISSION MANAGEMENT (OUTPATIENT)
Dept: CALL CENTER | Facility: HOSPITAL | Age: 50
End: 2025-01-08
Payer: COMMERCIAL

## 2025-01-08 NOTE — OUTREACH NOTE
Stroke Week 1 Survey      Flowsheet Row Responses   Religious facility patient discharged from? [facility]   Does the patient have one of the following disease processes/diagnoses(primary or secondary)? Stroke   Week 1 attempt successful? No   Unsuccessful attempts Attempt 1  [UTR 2 contact numbers]            Rain JIANG - Registered Nurse

## 2025-01-13 ENCOUNTER — TELEPHONE (OUTPATIENT)
Dept: CARDIOLOGY | Facility: CLINIC | Age: 50
End: 2025-01-13
Payer: COMMERCIAL

## 2025-01-14 ENCOUNTER — READMISSION MANAGEMENT (OUTPATIENT)
Dept: CALL CENTER | Facility: HOSPITAL | Age: 50
End: 2025-01-14
Payer: COMMERCIAL

## 2025-01-14 NOTE — OUTREACH NOTE
Stroke Week 1 Survey      Flowsheet Row Responses   Spiritism facility patient discharged from? [facility]   Does the patient have one of the following disease processes/diagnoses(primary or secondary)? Stroke   Week 1 attempt successful? No   Unsuccessful attempts Attempt 2   Discharge diagnosis TIA (transient ischemic attack)            DOLORES ARNDT - Registered Nurse

## 2025-01-21 ENCOUNTER — READMISSION MANAGEMENT (OUTPATIENT)
Dept: CALL CENTER | Facility: HOSPITAL | Age: 50
End: 2025-01-21
Payer: COMMERCIAL

## 2025-01-21 NOTE — OUTREACH NOTE
Stroke Week 3 Survey      Flowsheet Row Responses   [facility] facility patient discharged from? [facility]   Does the patient have one of the following disease processes/diagnoses(primary or secondary)? Stroke   Week 3 attempt successful? No   Unsuccessful attempts Attempt 2  [spoke with sister who advised to call later.  Called back at 4pm, no answer.]            JEANINE HILLMAN - Registered Nurse

## 2025-03-13 PROBLEM — I36.1 NONRHEUMATIC TRICUSPID VALVE REGURGITATION: Chronic | Status: ACTIVE | Noted: 2022-09-20

## 2025-03-13 PROBLEM — E66.813 OBESITY, CLASS III, BMI 40-49.9 (MORBID OBESITY): Chronic | Status: ACTIVE | Noted: 2025-01-03

## 2025-03-13 PROBLEM — I50.42 CHRONIC COMBINED SYSTOLIC AND DIASTOLIC CONGESTIVE HEART FAILURE: Chronic | Status: ACTIVE | Noted: 2022-09-19

## 2025-03-13 PROBLEM — Z95.810 PRESENCE OF AUTOMATIC CARDIOVERTER/DEFIBRILLATOR (AICD): Chronic | Status: ACTIVE | Noted: 2023-03-16

## 2025-03-13 PROBLEM — I27.20 PULMONARY HYPERTENSION: Chronic | Status: ACTIVE | Noted: 2022-09-20

## 2025-03-13 PROBLEM — I42.0 CARDIOMYOPATHY, DILATED: Chronic | Status: ACTIVE | Noted: 2022-09-20

## 2025-03-13 PROBLEM — E66.01 OBESITY, CLASS III, BMI 40-49.9 (MORBID OBESITY): Chronic | Status: ACTIVE | Noted: 2025-01-03

## 2025-03-13 PROBLEM — Z86.73 HISTORY OF TIA (TRANSIENT ISCHEMIC ATTACK): Chronic | Status: ACTIVE | Noted: 2025-01-03

## 2025-03-13 PROBLEM — E66.2 CLASS 2 OBESITY WITH ALVEOLAR HYPOVENTILATION, SERIOUS COMORBIDITY, AND BODY MASS INDEX (BMI) OF 38.0 TO 38.9 IN ADULT: Status: RESOLVED | Noted: 2022-09-19 | Resolved: 2025-03-13

## 2025-03-13 PROBLEM — Z86.73 HISTORY OF TIA (TRANSIENT ISCHEMIC ATTACK): Status: ACTIVE | Noted: 2025-01-03

## 2025-03-13 PROBLEM — E66.812 CLASS 2 OBESITY WITH ALVEOLAR HYPOVENTILATION, SERIOUS COMORBIDITY, AND BODY MASS INDEX (BMI) OF 38.0 TO 38.9 IN ADULT: Status: RESOLVED | Noted: 2022-09-19 | Resolved: 2025-03-13

## 2025-03-13 NOTE — PROGRESS NOTES
Subjective:     Encounter Date:03/24/2025        Patient ID: Misael Carney [id number]     Chief Complaint:  6 month follow-up, device check    History of Present Illness:  Misael Carney is a 49 y.o. male with a history of nonischemic dilated cardiomyopathy with BiV ICD placement, hyperlipidemia, prediabetes, morbid obesity and recent TIA who presents to the office for 6 month follow-up and device check. He denies any current complaints, including chest pain, shortness of breath or leg swelling.       Review of systems:  Review of Systems   Constitutional: Negative for malaise/fatigue.   Cardiovascular:  Negative for chest pain, dyspnea on exertion, leg swelling and palpitations.   Respiratory:  Negative for cough and shortness of breath.    Gastrointestinal:  Negative for abdominal pain, nausea and vomiting.   Neurological:  Negative for dizziness, focal weakness, headaches, light-headedness and numbness.   All other systems reviewed and are negative.        The following portions of the patient's history were reviewed and updated as appropriate: allergies, current medications, past family history, past medical history, past social history, past surgical history and problem list.    Past Medical History:  Past Medical History:   Diagnosis Date    Abnormal ECG 9/19/2022    CHF (congestive heart failure)     Class 2 obesity with alveolar hypoventilation, serious comorbidity, and body mass index (BMI) of 38.0 to 38.9 in adult 09/19/2022    Congenital heart disease 9/19/2022    Coronary artery disease     Diabetes mellitus     LBBB (left bundle branch block) 04/04/2023    Other hyperlipidemia 09/19/2022    Peptic ulcer disease     TIA (transient ischemic attack) 01/03/2025       Past Surgical History:  Past Surgical History:   Procedure Laterality Date    ANKLE SURGERY Left 2016    CARDIAC CATHETERIZATION Bilateral 09/21/2022    Procedure: Right and Left Heart Cath;  Surgeon: Ridge Lindsay MD;  Location: [facility]  CATH INVASIVE LOCATION;  Service: Cardiovascular;  Laterality: Bilateral;    CARDIAC DEFIBRILLATOR PLACEMENT  03/02/2023    CARDIAC ELECTROPHYSIOLOGY PROCEDURE N/A 03/02/2023    Procedure: BivICD Abbott aware;  Surgeon: Marci Cerda MD;  Location: [facility] CATH INVASIVE LOCATION;  Service: Cardiovascular;  Laterality: N/A;        Allergies:  Allergies   Allergen Reactions    Nsaids GI Intolerance       Current Meds:     Current Outpatient Medications:     acetaminophen (TYLENOL) 325 MG tablet, Take 2 tablets by mouth Every 4 (Four) Hours As Needed for Mild Pain or Fever (Temperature greater than or equal to 37 C)., Disp: , Rfl:     aspirin 81 MG EC tablet, Take 1 tablet by mouth Daily., Disp: 30 tablet, Rfl: 0    atorvastatin (LIPITOR) 80 MG tablet, Take 1 tablet by mouth Every Night., Disp: 90 tablet, Rfl: 1    bumetanide (BUMEX) 1 MG tablet, Take 1 tablet by mouth 2 (Two) Times a Day., Disp: 180 tablet, Rfl: 1    carvedilol (COREG) 12.5 MG tablet, Take 1 tablet by mouth 2 (Two) Times a Day With Meals., Disp: 180 tablet, Rfl: 3    clopidogrel (PLAVIX) 75 MG tablet, Take 1 tablet by mouth Daily., Disp: 90 tablet, Rfl: 1    fluticasone (Flonase) 50 MCG/ACT nasal spray, Administer 2 sprays into the nostril(s) as directed by provider Daily As Needed for Allergies., Disp: , Rfl:     lisinopril (PRINIVIL,ZESTRIL) 10 MG tablet, Take 1 tablet by mouth Daily., Disp: 90 tablet, Rfl: 3    Social History:   Social History     Tobacco Use    Smoking status: Never    Smokeless tobacco: Never   Substance Use Topics    Alcohol use: Not Currently     Comment: occ        Family History:  Family History   Problem Relation Age of Onset    Asthma Mother     Heart attack Father     Heart disease Father     Heart failure Father     Hypertension Father                  Objective:       Physical Exam:  Vitals reviewed.   Constitutional:       Appearance: Well-developed and well-groomed. Morbidly obese.   Eyes:      Conjunctiva/sclera:  "Conjunctivae normal.      Pupils: Pupils are equal, round, and reactive to light.   HENT:      Head: Normocephalic and atraumatic.    Mouth/Throat:      Mouth: Mucous membranes are moist.      Pharynx: Oropharynx is clear.   Pulmonary:      Effort: Pulmonary effort is normal.      Breath sounds: Normal breath sounds.   Cardiovascular:      PMI at left midclavicular line. Bradycardia present. Regular rhythm.      Murmurs: There is no murmur.   Pulses:     Intact distal pulses.   Edema:     Pretibial: bilateral trace edema of the pretibial area.     Ankle: bilateral trace edema of the ankle.  Abdominal:      General: Bowel sounds are normal.      Palpations: Abdomen is soft.   Musculoskeletal: Normal range of motion.      Cervical back: Normal range of motion and neck supple. Skin:     General: Skin is warm and dry.   Neurological:      Mental Status: Alert and oriented to person, place, and time.   Psychiatric:         Mood and Affect: Mood normal.         Behavior: Behavior normal.         Vital signs:  /73 (BP Location: Left arm, Patient Position: Sitting, Cuff Size: Large Adult)   Pulse 59   Ht 188 cm (74\")   Wt (!) 143 kg (315 lb)   SpO2 96%   BMI 40.44 kg/m²       Recent labs reviewed:    CBC        BMP        CMP       Creatinine Clearance  CrCl cannot be calculated (Patient's most recent lab result is older than the maximum 30 days allowed.).    BNP        TROPONIN        CoAg          Cardiology results reviewed:      Echocardiogram  Results for orders placed during the hospital encounter of 01/02/25    Adult Transthoracic Echo Complete W/ Cont if Necessary Per Protocol (With Agitated Saline)    Interpretation Summary    Left ventricular systolic function is normal. Calculated left ventricular EF = 51% Left ventricular ejection fraction appears to be 51 - 55%.    The left ventricular cavity is moderately dilated.    Left ventricular diastolic function is consistent with (grade II w/high LAP) " pseudonormalization.  Average GLS -15.5%.    Saline test results are negative for right to left atrial level shunt.    Estimated right ventricular systolic pressure from tricuspid regurgitation is normal (<35 mmHg).    No significant valvular abnormalities noted.      Cardiac catheterization  Results for orders placed during the hospital encounter of 09/19/22    Cardiac Catheterization/Vascular Study    Narrative  PROCEDURE PERFORMED  Ultrasound guided vascular access  Right heart Catheterization  Left Heart Catheterization  Coronary Angiogram  Moderate Sedation    INDICATIONS FOR PROCEDURE  46-year-old man with multiple cardiovascular risk factors and morbid obesity presented with new onset of heart failure with reduced ejection fraction and was found to have pulmonary hypertension.  Risk and benefit of right and left heart cath were discussed with the patient and he was brought in for elective coronary angiogram and right heart cath.    PROCEDURE IN DETAIL  Informed consent was obtained from the patient after explaining the risks, benefits, and alternative options of the procedure. After obtaining informed consent, the patient was brought to the cath lab and was prepped in a sterile fashion. Lidocaine 1% was used for local anesthesia into the right IJ access site. Right IJ vein was accessed using the micropuncture needle under ultrasound guidance and micropuncture wire advanced under flouroscopy. A 7 Macedonian vascular sheath was put into place percutaneously over guide-wire. Guide wires were removed. A 7Fr swan hiram catheter was advanced to wedge position. RA, RV and PA and wedge pressures were recorded. PA sat and arterial sats recorded and the swan was subsequently removed in its entirety. Next, The right radial artery was accessed with an angiocath needle under ultrasound guidance. A 6F slendersheath was inserted successfully.  Afterwards, 6F JR4 diagnostic catheter was advanced over a wire into the ascending  aorta and was used to cross the AV and obtain LV pressures.  Gradient across the AV measured via pullback technique.  Next, 6F JL3.5 and JR4 catheters were used to engage the ostia of the left main and RCA respectively. Images of the right and left coronary systems were obtained. The catheters were exchanged over a wire and subsequently removed. The patient tolerated the procedure well without any complications. The pictures were reviewed at the end of the procedure. A TR band was applied.    CORONARY ANGIOGRAM FINDINGS:  Dominance: Right  #Left main: Left main is  A large vessel Which gives rise to the LAD left circumflex artery.  There is no angiographically significant stenosis  #Left Anterior Descending Artery: LAD is a large caliber vessel which gives rise to several septal perforators and several diagonal branches. There is no angiographically significant stenosis  #Left Circumflex: The LCx is a large, non-dominant vessel which gives rise to OM branches. It is angiographically free of disease  #Right Coronary Artery: The RCA is a large, dominant vessel which gives rise to several small caliber branches and the PDA and PLV.  Right coronary artery is angiographically free from any significant disease.      [facility] HEMODYNAMICS:  LVp 109/24, 32  AOp 104/87, 96  No significant gradient on pullback across aortic valve.    RH HEMODYNAMICS:  RA 27/25, 24  RV 52/14, 27  PA 50/30, 42  PCW 33/34, 30  AO Sat 98%  PA Sat 73 %    Leif CO 7.68    Leif CI 2.9    ESTIMATED BLOOD LOSS:  10 ml    COMPLICATIONS:  None    PROCEDURE DATA:  Contrast Used:20 cc  Sedation Time: 27 minutes 55 seconds    IMPRESSIONS  No angiographic evidence of obstructive CAD  Elevated left heart filling pressures with LVEDP of 35 mmHg  Non ischemic cardiomyopathy    RECOMMENDATIONS  -- GDMT for cardiomyopathy  -- Increase IV diuretics  -- Risk factor and lifestyle modifications  -- Repeat echocardiogram after 3 months of GDMT               Assessment:          Diagnoses and all orders for this visit:    1. Chronic combined systolic and diastolic congestive heart failure (Primary)  -     Home Sleep Study; Future    2. Cardiomyopathy, dilated    3. Presence of automatic cardioverter/defibrillator (AICD)    4. Other hyperlipidemia    5. Pre-diabetes    6. Obesity, Class III, BMI 40-49.9 (morbid obesity)  -     Home Sleep Study; Future    7. History of TIA (transient ischemic attack)    8. Hypersomnia  -     Home Sleep Study; Future    9. Insomnia, unspecified type  -     Home Sleep Study; Future    Other orders  -     atorvastatin (LIPITOR) 80 MG tablet; Take 1 tablet by mouth Every Night.  Dispense: 90 tablet; Refill: 1  -     bumetanide (BUMEX) 1 MG tablet; Take 1 tablet by mouth 2 (Two) Times a Day.  Dispense: 180 tablet; Refill: 1  -     clopidogrel (PLAVIX) 75 MG tablet; Take 1 tablet by mouth Daily.  Dispense: 90 tablet; Refill: 1                Plan:       Chronic combined systolic and diastolic congestive heart failure secondary to dilated cardiomyopathy  Presence of automatic cardioverter/defibrillator (AICD)  He was diagnosed with an EF of 25%, which has now improved to 55%  He has grade 2 diastolic dysfunction  BiV ICD implanted March 2023  Device check today shows 99% of the time he is being BiV paced, 1 episode of ATP in January 2025, and 5.5 years battery life remain.   Continue Bumex, carvedilol and lisinopril  Aldactone was previously stopped due to low blood pressure  He is unable to afford Jardiance.  Recommend daily weights and low sodium diet    Other hyperlipidemia  Recent LDL at goal  Continue atorvastatin     Pre-diabetes  Recent A1c 6.6%  Lifestyle modifications recommended  The patient is scheduled to have repeat labs soon with his PCP.     Obesity, Class III, BMI 40-49.9 (morbid obesity)  BMI is 40.44  Lifestyle modifications recommended  Sleep apnea screening:  the patient reports hypersomnia, insomnia, and restless sleep.  Home sleep study  ordered    History of TIA (transient ischemic attack)  Diagnosed in Jan. 2025  MRI brain showed no acute infarction.  Echocardiogram showed negative saline test results for right to left atrial level shunt.   Continue aspirin and high intensity statin       Electronically signed by RENY Hoffman, 03/24/25, 11:25 AM EDT.

## 2025-03-24 ENCOUNTER — OFFICE VISIT (OUTPATIENT)
Dept: CARDIOLOGY | Facility: CLINIC | Age: 50
End: 2025-03-24
Payer: COMMERCIAL

## 2025-03-24 ENCOUNTER — CLINICAL SUPPORT NO REQUIREMENTS (OUTPATIENT)
Dept: CARDIOLOGY | Facility: CLINIC | Age: 50
End: 2025-03-24
Payer: COMMERCIAL

## 2025-03-24 VITALS
WEIGHT: 315 LBS | HEART RATE: 59 BPM | BODY MASS INDEX: 40.43 KG/M2 | DIASTOLIC BLOOD PRESSURE: 73 MMHG | HEIGHT: 74 IN | SYSTOLIC BLOOD PRESSURE: 105 MMHG | OXYGEN SATURATION: 96 %

## 2025-03-24 DIAGNOSIS — E78.49 OTHER HYPERLIPIDEMIA: Chronic | ICD-10-CM

## 2025-03-24 DIAGNOSIS — I50.42 CHRONIC COMBINED SYSTOLIC AND DIASTOLIC CONGESTIVE HEART FAILURE: Primary | Chronic | ICD-10-CM

## 2025-03-24 DIAGNOSIS — I42.0 CARDIOMYOPATHY, DILATED: Chronic | ICD-10-CM

## 2025-03-24 DIAGNOSIS — I50.42 CHRONIC COMBINED SYSTOLIC AND DIASTOLIC CONGESTIVE HEART FAILURE: Chronic | ICD-10-CM

## 2025-03-24 DIAGNOSIS — E66.01 OBESITY, CLASS III, BMI 40-49.9 (MORBID OBESITY): Chronic | ICD-10-CM

## 2025-03-24 DIAGNOSIS — I42.0 CARDIOMYOPATHY, DILATED: Primary | Chronic | ICD-10-CM

## 2025-03-24 DIAGNOSIS — Z95.810 PRESENCE OF AUTOMATIC CARDIOVERTER/DEFIBRILLATOR (AICD): Chronic | ICD-10-CM

## 2025-03-24 DIAGNOSIS — R73.03 PRE-DIABETES: Chronic | ICD-10-CM

## 2025-03-24 DIAGNOSIS — G47.10 HYPERSOMNIA: ICD-10-CM

## 2025-03-24 DIAGNOSIS — G47.00 INSOMNIA, UNSPECIFIED TYPE: ICD-10-CM

## 2025-03-24 DIAGNOSIS — Z86.73 HISTORY OF TIA (TRANSIENT ISCHEMIC ATTACK): Chronic | ICD-10-CM

## 2025-03-24 DIAGNOSIS — I44.7 LBBB (LEFT BUNDLE BRANCH BLOCK): ICD-10-CM

## 2025-03-24 PROCEDURE — 93284 PRGRMG EVAL IMPLANTABLE DFB: CPT | Performed by: INTERNAL MEDICINE

## 2025-03-24 PROCEDURE — 99214 OFFICE O/P EST MOD 30 MIN: CPT | Performed by: NURSE PRACTITIONER

## 2025-03-24 RX ORDER — BUMETANIDE 1 MG/1
1 TABLET ORAL 2 TIMES DAILY
Qty: 180 TABLET | Refills: 1 | Status: SHIPPED | OUTPATIENT
Start: 2025-03-24

## 2025-03-24 RX ORDER — ATORVASTATIN CALCIUM 80 MG/1
80 TABLET, FILM COATED ORAL NIGHTLY
Qty: 90 TABLET | Refills: 1 | Status: SHIPPED | OUTPATIENT
Start: 2025-03-24

## 2025-03-24 RX ORDER — CLOPIDOGREL BISULFATE 75 MG/1
75 TABLET ORAL DAILY
Qty: 90 TABLET | Refills: 1 | Status: SHIPPED | OUTPATIENT
Start: 2025-03-24

## 2025-05-05 ENCOUNTER — HOSPITAL ENCOUNTER (OUTPATIENT)
Dept: SLEEP MEDICINE | Facility: HOSPITAL | Age: 50
Discharge: HOME | End: 2025-05-05
Admitting: NURSE PRACTITIONER
Payer: COMMERCIAL

## 2025-05-05 DIAGNOSIS — I50.42 CHRONIC COMBINED SYSTOLIC AND DIASTOLIC CONGESTIVE HEART FAILURE: Chronic | ICD-10-CM

## 2025-05-05 DIAGNOSIS — G47.10 HYPERSOMNIA: ICD-10-CM

## 2025-05-05 DIAGNOSIS — G47.00 INSOMNIA, UNSPECIFIED TYPE: ICD-10-CM

## 2025-05-05 DIAGNOSIS — E66.01 OBESITY, CLASS III, BMI 40-49.9 (MORBID OBESITY): Chronic | ICD-10-CM

## 2025-05-05 PROCEDURE — G0399 HOME SLEEP TEST/TYPE 3 PORTA: HCPCS

## 2025-05-20 ENCOUNTER — TELEPHONE (OUTPATIENT)
Dept: NEUROLOGY | Facility: CLINIC | Age: 50
End: 2025-05-20
Payer: COMMERCIAL

## 2025-05-20 DIAGNOSIS — G47.33 OBSTRUCTIVE SLEEP APNEA: Primary | ICD-10-CM

## 2025-05-20 NOTE — TELEPHONE ENCOUNTER
----- Message from Joseph Seipel sent at 5/7/2025 10:31 AM EDT -----  Regarding: Sleep  Set up on auto CPAP

## 2025-07-21 ENCOUNTER — TELEPHONE (OUTPATIENT)
Dept: CARDIOLOGY | Facility: CLINIC | Age: 50
End: 2025-07-21
Payer: COMMERCIAL

## 2025-07-21 NOTE — TELEPHONE ENCOUNTER
"  Caller: Misael Carney \"Juan Jose\"    Relationship: Self    Best call back number: [phone]    What is the best time to reach you: BEFORE 3 PM    What was the call regarding: PATIENT STATED THAT HE HAD A SLEEP STUDY DONE IN APRIL ORDERED BY JASMINA OGLESBY AND ACCORDING TO THE INSTRUCTIONS WHEN HE GOT THE CPAP MACHINE IT ADVISED THAT HE IS SUPPOSE TO BE SEEN WITHIN 90 DAYS FOR EVALUATION BY ORDERING PROVIDER AND HIS NEXT APPOINTMENT IS 10.08.2025 FOR [facility] @ 11 AM AND FOLLOW UP WITH DR. PEREA @11:30 AM. PATIENT WANTS TO MAKE SURE THAT THIS APPOINTMENT IS OK. PLEASE CONTACT PATIENT TO ADVISE. THANK YOU.    Is it okay if the provider responds through [facility]: CALL      "

## 2025-07-28 NOTE — PROGRESS NOTES
Chief Complaint  Sleep Apnea    Subjective          Misael Carney presents to [facility]  History of Present Illness    Juan Jose Carney is a 49-year-old male seen today for initial PAP compliance.  He was seen in the hospital in January for TIA.  Home sleep study was completed 5/5/2025 which showed an overall AHI of 9.3 and a supine AHI of 13.6.  Minimum SpO2 was 86%.  He was set up with PAP therapy on 6/6/2025 from [facility].    Patient currently uses a fullface mask with tube on top.  He wakes up more with a dry mouth but has noticed that he is breathing out of his nose more rather than his mouth at night.  He feels that he is more alert upon waking in the morning but not a significant change in his energy throughout the day.  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     He admits to having trouble getting used to the mask and he had a death in the family and that has affected his sleep recently.    The patient has a history of cardiomyopathy.         Sleep testing history:    On NPSG/home sleep test at [facility] , 5-5-25 patient had Moderate obstructive sleep apnea syndrome with apnea-hypopnea index of 9.3 per sleep hour, minimum SpO2 of 86%    PAP download (infibond 6/6/2025 - 7/5/2025):  The patient is on auto CPAP therapy at 6-16 cm/H2O.   Data indicates Excellent compliance. With 90% usage for more than 4 hours with an average usage of 5 hours 37 minutes. AHI down to 0.6 .  Average pressures 9.6 centimeters H2O.  Average leak 22.3 LPM.     The patient's hypersomnia has improved       [place] Sleepiness Scale:  Sitting and reading JS [place] Sleepiness: 2 WatchingTV JS [place] Sleepiness: 2  Sitting, inactive, in a public place JS [place] Sleepiness: 1  As a passenger in a car for 1 hour w/o a break  JS [place] Sleepiness: 0  Lying down to rest in the afternoon JS [place] Sleepiness: 1  Sitting and talking to someone  JS [place] Sleepiness: 0  Sitting quietly after a  "lunch  JS [place] Sleepiness: 1  In a car, while stopped for traffic or a light  JS [place] Sleepiness: 0  Total 7    Review of Systems   Respiratory:  Positive for apnea.    All other systems reviewed and are negative.     Objective   Vital Signs:   /79   Pulse 60   Ht 188 cm (74\")   Wt (!) 143 kg (316 lb)   BMI 40.57 kg/m²     Physical Exam  Vitals reviewed.   Constitutional:       Appearance: He is well-developed. He is morbidly obese.   HENT:      Head: Normocephalic and atraumatic.      Comments: MMP 4  Eyes:      Extraocular Movements: Extraocular movements intact.      Pupils: Pupils are equal, round, and reactive to light.   Neck:      Vascular: No carotid bruit.   Cardiovascular:      Rate and Rhythm: Normal rate.      Heart sounds: No murmur heard.  Pulmonary:      Effort: Pulmonary effort is normal.   Musculoskeletal:      Cervical back: Normal range of motion and neck supple.   Skin:     General: Skin is warm and dry.   Neurological:      Mental Status: He is alert and oriented to person, place, and time.      Cranial Nerves: Cranial nerves 2-12 are intact. No cranial nerve deficit.      Sensory: No sensory deficit.      Motor: Motor function is intact. No tremor.      Coordination: Finger-Nose-Finger Test normal.      Gait: Gait is intact. Gait normal.   Psychiatric:         Attention and Perception: Attention normal.         Mood and Affect: Mood normal.         Speech: Speech normal.         Behavior: Behavior normal.         Cognition and Memory: Cognition and memory normal.         Judgment: Judgment normal.        Result Review :                 Assessment and Plan    Diagnoses and all orders for this visit:    1. Obstructive sleep apnea (Primary)  -     PAP Therapy    2. History of transient ischemic attack (TIA)    Juan Jose Carney seen today for initial PAP compliance.  He had a TIA earlier this year and diagnosed with cardiomyopathy.  He has an AICD.  Sleep study completed in May showed " mild ARCELIA with an overall AHI of 9.3 and a supine AHI of 13.6.  He was set up with PAP therapy through [facility] on 6/6/2025.  The patient is using a fullface mask but is struggling to tolerate and get used to wearing PAP therapy.  Tosses and turns frequently and over the last week or so has not been using his device as regularly because he is having trouble sleeping after a death in the family.    He met initial compliance with 90% over 4 hours over the first 30 days.  It is effective in treating his sleep apnea with an overall AHI of 0.7.  Leaking has improved with adjustment of the mask.    He will continue to wear PAP therapy over 4 hours every night and attempt to get 7 to 8 hours of sleep each night.  Encouraged the patient to reach out to the respiratory therapist at [facility] if he continues to struggle with the mask.  They should be able to assist him with a mask refitting.  He may even do better with a hybrid fullface mask or nasal pillow with a chinstrap.    He will follow-up in 4 to 6 months    The patient is compliant with and benefiting from PAP therapy.      Follow Up   Return for 4-6 months .    Patient was given instructions and counseling regarding his condition or for health maintenance advice. Please see specific information pulled into the AVS if appropriate.       This document has been electronically signed by RENY Villalpando on July 31, 2025 10:19 EDT

## 2025-07-31 ENCOUNTER — OFFICE VISIT (OUTPATIENT)
Dept: NEUROLOGY | Facility: CLINIC | Age: 50
End: 2025-07-31
Payer: COMMERCIAL

## 2025-07-31 VITALS
WEIGHT: 315 LBS | HEART RATE: 60 BPM | SYSTOLIC BLOOD PRESSURE: 123 MMHG | HEIGHT: 74 IN | DIASTOLIC BLOOD PRESSURE: 79 MMHG | BODY MASS INDEX: 40.43 KG/M2

## 2025-07-31 DIAGNOSIS — Z86.73 HISTORY OF TRANSIENT ISCHEMIC ATTACK (TIA): ICD-10-CM

## 2025-07-31 DIAGNOSIS — G47.33 OBSTRUCTIVE SLEEP APNEA: Primary | ICD-10-CM

## 2025-08-28 ENCOUNTER — LAB (OUTPATIENT)
Dept: LAB | Facility: HOSPITAL | Age: 50
End: 2025-08-28
Payer: COMMERCIAL

## 2025-08-28 ENCOUNTER — CONSULT (OUTPATIENT)
Dept: ONCOLOGY | Facility: CLINIC | Age: 50
End: 2025-08-28
Payer: COMMERCIAL

## 2025-08-28 VITALS
OXYGEN SATURATION: 97 % | SYSTOLIC BLOOD PRESSURE: 111 MMHG | BODY MASS INDEX: 40.3 KG/M2 | HEIGHT: 74 IN | DIASTOLIC BLOOD PRESSURE: 75 MMHG | WEIGHT: 314 LBS | RESPIRATION RATE: 18 BRPM | TEMPERATURE: 97.8 F | HEART RATE: 60 BPM

## 2025-08-28 DIAGNOSIS — D75.839 THROMBOCYTOSIS: ICD-10-CM

## 2025-08-28 DIAGNOSIS — L98.9 DERMATOSIS OF SCALP: Primary | ICD-10-CM

## 2025-08-28 LAB
ALBUMIN SERPL-MCNC: 4.5 G/DL (ref 3.5–5.2)
ALBUMIN/GLOB SERPL: 1.2 G/DL
ALP SERPL-CCNC: 71 U/L (ref 39–117)
ALT SERPL W P-5'-P-CCNC: 29 U/L (ref 1–41)
ANION GAP SERPL CALCULATED.3IONS-SCNC: 13.3 MMOL/L (ref 5–15)
AST SERPL-CCNC: 39 U/L (ref 1–40)
BASOPHILS # BLD AUTO: 0.09 10*3/MM3 (ref 0–0.2)
BASOPHILS NFR BLD AUTO: 0.8 % (ref 0–1.5)
BILIRUB SERPL-MCNC: 1.4 MG/DL (ref 0–1.2)
BUN SERPL-MCNC: 15.2 MG/DL (ref 6–20)
BUN/CREAT SERPL: 12 (ref 7–25)
CALCIUM SPEC-SCNC: 9.4 MG/DL (ref 8.6–10.5)
CHLORIDE SERPL-SCNC: 101 MMOL/L (ref 98–107)
CO2 SERPL-SCNC: 24.7 MMOL/L (ref 22–29)
CREAT SERPL-MCNC: 1.27 MG/DL (ref 0.76–1.27)
DEPRECATED RDW RBC AUTO: 45.8 FL (ref 37–54)
EGFRCR SERPLBLD CKD-EPI 2021: 70.1 ML/MIN/1.73
EOSINOPHIL # BLD AUTO: 0.46 10*3/MM3 (ref 0–0.4)
EOSINOPHIL NFR BLD AUTO: 4.2 % (ref 0.3–6.2)
ERYTHROCYTE [DISTWIDTH] IN BLOOD BY AUTOMATED COUNT: 13.6 % (ref 12.3–15.4)
FERRITIN SERPL-MCNC: 235 NG/ML (ref 30–400)
GLOBULIN UR ELPH-MCNC: 3.8 GM/DL
GLUCOSE SERPL-MCNC: 94 MG/DL (ref 65–99)
HCT VFR BLD AUTO: 42.2 % (ref 37.5–51)
HGB BLD-MCNC: 14.2 G/DL (ref 13–17.7)
IMM GRANULOCYTES # BLD AUTO: 0.08 10*3/MM3 (ref 0–0.05)
IMM GRANULOCYTES NFR BLD AUTO: 0.7 % (ref 0–0.5)
IRON 24H UR-MRATE: 74 MCG/DL (ref 59–158)
IRON SATN MFR SERPL: 24 % (ref 20–50)
LYMPHOCYTES # BLD AUTO: 2.51 10*3/MM3 (ref 0.7–3.1)
LYMPHOCYTES NFR BLD AUTO: 23 % (ref 19.6–45.3)
MCH RBC QN AUTO: 30.2 PG (ref 26.6–33)
MCHC RBC AUTO-ENTMCNC: 33.6 G/DL (ref 31.5–35.7)
MCV RBC AUTO: 89.8 FL (ref 79–97)
MONOCYTES # BLD AUTO: 1.01 10*3/MM3 (ref 0.1–0.9)
MONOCYTES NFR BLD AUTO: 9.3 % (ref 5–12)
NEUTROPHILS NFR BLD AUTO: 6.75 10*3/MM3 (ref 1.7–7)
NEUTROPHILS NFR BLD AUTO: 62 % (ref 42.7–76)
PLATELET # BLD AUTO: 640 10*3/MM3 (ref 140–450)
PMV BLD AUTO: 9.6 FL (ref 6–12)
POTASSIUM SERPL-SCNC: 3.4 MMOL/L (ref 3.5–5.2)
PROT SERPL-MCNC: 8.3 G/DL (ref 6–8.5)
RBC # BLD AUTO: 4.7 10*6/MM3 (ref 4.14–5.8)
SODIUM SERPL-SCNC: 139 MMOL/L (ref 136–145)
TIBC SERPL-MCNC: 307 MCG/DL (ref 298–536)
TRANSFERRIN SERPL-MCNC: 206 MG/DL (ref 200–360)
WBC NRBC COR # BLD AUTO: 10.9 10*3/MM3 (ref 3.4–10.8)

## 2025-08-28 PROCEDURE — 80053 COMPREHEN METABOLIC PANEL: CPT | Performed by: INTERNAL MEDICINE

## 2025-08-28 PROCEDURE — 85025 COMPLETE CBC W/AUTO DIFF WBC: CPT | Performed by: INTERNAL MEDICINE

## 2025-08-28 PROCEDURE — 36415 COLL VENOUS BLD VENIPUNCTURE: CPT | Performed by: INTERNAL MEDICINE

## 2025-08-28 PROCEDURE — 83540 ASSAY OF IRON: CPT | Performed by: INTERNAL MEDICINE

## 2025-08-28 PROCEDURE — 82728 ASSAY OF FERRITIN: CPT | Performed by: INTERNAL MEDICINE

## 2025-08-28 PROCEDURE — 84466 ASSAY OF TRANSFERRIN: CPT | Performed by: INTERNAL MEDICINE

## 2025-08-29 ENCOUNTER — PATIENT ROUNDING (BHMG ONLY) (OUTPATIENT)
Dept: ONCOLOGY | Facility: CLINIC | Age: 50
End: 2025-08-29
Payer: COMMERCIAL

## (undated) DEVICE — INTRO SHEATH PRELUDE SNAP .038 10F 13CM W/SDPRT FUSCHIA

## (undated) DEVICE — CATH DIAG IMPULSE FR4 6F 100CM

## (undated) DEVICE — RADIFOCUS GLIDEWIRE: Brand: GLIDEWIRE

## (undated) DEVICE — IMMOB SHLDR CUT/AWAY UNIV

## (undated) DEVICE — VIOLET BRAIDED (POLYGLACTIN 910), SYNTHETIC ABSORBABLE SUTURE: Brand: COATED VICRYL

## (undated) DEVICE — TBG PENCL TELESCP MEGADYNE SMOKE EVAC 10FT

## (undated) DEVICE — ADHS LIQ MASTISOL 2/3ML

## (undated) DEVICE — BALN CORNRY SINUS 6F 1X80CM

## (undated) DEVICE — CATH GUIDE OUTR CPS DIRECT PL OC W/SHEATH 115DEG 7F/9F 47CM

## (undated) DEVICE — SUT ETHIB 0/0 MO6 I8IN CX45D

## (undated) DEVICE — GW EMR FIX EXCHG J STD .035 3MM 260CM

## (undated) DEVICE — 3M™ PATIENT PLATE, CORDED, SPLIT, LARGE, 40 PER CASE, 1179: Brand: 3M™

## (undated) DEVICE — PACEMAKER CDS: Brand: MEDLINE INDUSTRIES, INC.

## (undated) DEVICE — INTRO SHEATH PRELUDE SNAP .038 8F 13CM W/SDPRT

## (undated) DEVICE — DRAPE, RADIAL, STERILE: Brand: MEDLINE

## (undated) DEVICE — ST ACC MICROPUNCTURE STFF/CANN PLAT/TP 4F 21G 40CM

## (undated) DEVICE — UNDYED BRAIDED (POLYGLACTIN 910), SYNTHETIC ABSORBABLE SUTURE: Brand: COATED VICRYL

## (undated) DEVICE — Device

## (undated) DEVICE — TR BAND RADIAL ARTERY COMPRESSION DEVICE: Brand: TR BAND

## (undated) DEVICE — SWAN-GANZ THERMODILUTION CATHETER: Brand: SWAN-GANZ

## (undated) DEVICE — ANTIBACTERIAL UNDYED BRAIDED (POLYGLACTIN 910), SYNTHETIC ABSORBABLE SUTURE: Brand: COATED VICRYL

## (undated) DEVICE — 3M™ STERI-STRIP™ REINFORCED ADHESIVE SKIN CLOSURES, R1547, 1/2 IN X 4 IN (12 MM X 100 MM), 6 STRIPS/ENVELOPE: Brand: 3M™ STERI-STRIP™

## (undated) DEVICE — GW CHOICE PT PLS .014 182CM

## (undated) DEVICE — GLIDESHEATH SLENDER ACCESS KIT: Brand: GLIDESHEATH SLENDER

## (undated) DEVICE — INTRO SHEATH PRELUDE SNAP .038 6F 13CM W/SDPRT

## (undated) DEVICE — PK TRY HEART CATH 50

## (undated) DEVICE — ELECTRD DEFIB M/FUNC PROPADZ RADIOL 2PK

## (undated) DEVICE — PINNACLE INTRODUCER SHEATH: Brand: PINNACLE

## (undated) DEVICE — 3M™ IOBAN™ 2 ANTIMICROBIAL INCISE DRAPE 6650EZ: Brand: IOBAN™ 2

## (undated) DEVICE — CABL BIPOL W/ALLGTR CLIP/SM 12FT

## (undated) DEVICE — CATH DIAG EXPO .056 FL3.5 6F 100CM